# Patient Record
Sex: FEMALE | Race: BLACK OR AFRICAN AMERICAN | NOT HISPANIC OR LATINO | Employment: OTHER | URBAN - METROPOLITAN AREA
[De-identification: names, ages, dates, MRNs, and addresses within clinical notes are randomized per-mention and may not be internally consistent; named-entity substitution may affect disease eponyms.]

---

## 2018-10-31 ENCOUNTER — OFFICE VISIT (OUTPATIENT)
Dept: OBSTETRICS AND GYNECOLOGY | Facility: CLINIC | Age: 43
End: 2018-10-31
Attending: OBSTETRICS & GYNECOLOGY
Payer: COMMERCIAL

## 2018-10-31 ENCOUNTER — LAB VISIT (OUTPATIENT)
Dept: LAB | Facility: OTHER | Age: 43
End: 2018-10-31
Attending: OBSTETRICS & GYNECOLOGY
Payer: COMMERCIAL

## 2018-10-31 VITALS
SYSTOLIC BLOOD PRESSURE: 122 MMHG | BODY MASS INDEX: 19.48 KG/M2 | HEIGHT: 68 IN | WEIGHT: 128.5 LBS | DIASTOLIC BLOOD PRESSURE: 76 MMHG

## 2018-10-31 DIAGNOSIS — Z00.00 HEALTH CARE MAINTENANCE: Primary | ICD-10-CM

## 2018-10-31 DIAGNOSIS — R61 NIGHT SWEATS: ICD-10-CM

## 2018-10-31 PROCEDURE — 99386 PREV VISIT NEW AGE 40-64: CPT | Mod: S$GLB,,, | Performed by: OBSTETRICS & GYNECOLOGY

## 2018-10-31 PROCEDURE — 99999 PR PBB SHADOW E&M-EST. PATIENT-LVL III: CPT | Mod: PBBFAC,,, | Performed by: OBSTETRICS & GYNECOLOGY

## 2018-10-31 PROCEDURE — 83520 IMMUNOASSAY QUANT NOS NONAB: CPT

## 2018-10-31 PROCEDURE — 88175 CYTOPATH C/V AUTO FLUID REDO: CPT

## 2018-10-31 PROCEDURE — 87624 HPV HI-RISK TYP POOLED RSLT: CPT

## 2018-10-31 PROCEDURE — 36415 COLL VENOUS BLD VENIPUNCTURE: CPT

## 2018-10-31 RX ORDER — VALACYCLOVIR HYDROCHLORIDE 1 G/1
1000 TABLET, FILM COATED ORAL DAILY PRN
COMMUNITY
End: 2019-10-24 | Stop reason: SDUPTHER

## 2018-10-31 NOTE — PROGRESS NOTES
Gynecology      SUBJECTIVE:     Chief Complaint: Well Woman       History of Present Illness:  Pt is here for annual exam. PMHx insignificant.  Possibly interested in conceiving in the future. Questions about spontaneous conception versus egg freezing versus egg adoption  Is currently sexually active with one partner. Condoms for contraception. Denies STD history.   Has regular monthly menses. No problems with menses.  Reports night sweats. No hot flashes during the day.  Gets regular exercise. Denies tobacco. Is taking calcium and vitamin D supplements. Denies domestic violence.    Pap 2016? - normal per pt  MMG done a few months ago at outside facility - states she has a right breast cyst that has been monitored and noted to be benign for a few years  Colonoscopy n/a  DEXA n/a      Review of patient's allergies indicates:  No Known Allergies    Past Medical History:   Diagnosis Date    Breast disorder     Fibroids.    Herpes simplex virus (HSV) infection     HSV2.     History reviewed. No pertinent surgical history.  OB History      Para Term  AB Living    0 0 0 0 0 0    SAB TAB Ectopic Multiple Live Births    0 0 0 0 0        Family History   Problem Relation Age of Onset    Breast cancer Neg Hx     Colon cancer Neg Hx     Ovarian cancer Neg Hx      Social History     Tobacco Use    Smoking status: Never Smoker    Smokeless tobacco: Never Used   Substance Use Topics    Alcohol use: Yes     Comment: Socially.    Drug use: No       Current Outpatient Medications   Medication Sig    valACYclovir (VALTREX) 1000 MG tablet Take 1,000 mg by mouth daily as needed.     No current facility-administered medications for this visit.          Review of Systems:  Review of Systems   Constitutional: Negative.    Respiratory: Negative.  Negative for cough and shortness of breath.    Cardiovascular: Negative.  Negative for chest pain.   Gastrointestinal: Negative.  Negative for constipation, diarrhea,  nausea and vomiting.   Genitourinary: Negative.    Musculoskeletal: Negative.    Skin:  Negative.   Breast: negative.         OBJECTIVE:     Physical Exam:  Physical Exam   Constitutional: She is oriented to person, place, and time. She appears well-developed and well-nourished.   HENT:   Head: Normocephalic and atraumatic.   Cardiovascular: Normal rate, regular rhythm and normal heart sounds.   Pulmonary/Chest: Effort normal and breath sounds normal. Right breast exhibits no mass, no nipple discharge, no skin change and no tenderness. Left breast exhibits no mass, no nipple discharge, no skin change and no tenderness.       Abdominal: Soft. Bowel sounds are normal.   Genitourinary: Vagina normal and uterus normal. There is no lesion on the right labia. There is no lesion on the left labia. Uterus is not enlarged and not tender. Cervix exhibits no motion tenderness and no discharge. Right adnexum displays no mass and no tenderness. Left adnexum displays no mass and no tenderness. No tenderness or bleeding in the vagina. No vaginal discharge found.   Musculoskeletal: Normal range of motion.   Neurological: She is alert and oriented to person, place, and time.   Skin: Skin is warm and dry.   Psychiatric: She has a normal mood and affect. Her behavior is normal.         ASSESSMENT:       ICD-10-CM ICD-9-CM    1. Health care maintenance Z00.00 V70.0 Liquid-based pap smear, screening      HPV High Risk Genotypes, PCR   2. Night sweats R61 780.8 Antimullerian hormone (AMH)          Plan:      Surinder was seen today for well woman.    Diagnoses and all orders for this visit:    Health care maintenance  Pt was counseled on healthy diet and exercise, routine screening timelines for PAP/MAMMOGRAM/COLONOSCOPY/LABS. Offered updated immunizations. Counseled regarding appropriate seatbelt use, domestic violence, avoiding tobacco/ETOH/illicit drugs. All questions answered. Pt encouraged to take age-appropriate vitamin daily.    Discussed contraception options. Patient not interested at this time.  -     Liquid-based pap smear, screening  -     HPV High Risk Genotypes, PCR    Night sweats  Discussed fertility options. Will check AMH level. Information given for referral to [facility].  -     Antimullerian hormone (AMH); Future        Orders Placed This Encounter   Procedures    HPV High Risk Genotypes, PCR    Antimullerian hormone (AMH)       Follow-up in about 1 year (around 10/31/2019) for well woman.     Counseling time: 15 minutes    Tea Ramon

## 2018-11-05 LAB — MIS SERPL-MCNC: 0.5 NG/ML (ref 0.9–9.5)

## 2018-11-06 LAB
HPV HR 12 DNA CVX QL NAA+PROBE: NEGATIVE
HPV16 AG SPEC QL: NEGATIVE
HPV18 DNA SPEC QL NAA+PROBE: NEGATIVE

## 2019-10-24 ENCOUNTER — TELEPHONE (OUTPATIENT)
Dept: OBSTETRICS AND GYNECOLOGY | Facility: CLINIC | Age: 44
End: 2019-10-24

## 2019-10-24 DIAGNOSIS — A60.9 HSV (HERPES SIMPLEX VIRUS) ANOGENITAL INFECTION: ICD-10-CM

## 2019-10-24 DIAGNOSIS — B00.9 HERPES: Primary | ICD-10-CM

## 2019-10-24 DIAGNOSIS — A60.9 HSV (HERPES SIMPLEX VIRUS) ANOGENITAL INFECTION: Primary | ICD-10-CM

## 2019-10-24 RX ORDER — VALACYCLOVIR HYDROCHLORIDE 1 G/1
1000 TABLET, FILM COATED ORAL DAILY PRN
Qty: 30 TABLET | Refills: 0 | Status: CANCELLED | OUTPATIENT
Start: 2019-10-24

## 2019-10-24 RX ORDER — VALACYCLOVIR HYDROCHLORIDE 1 G/1
1000 TABLET, FILM COATED ORAL DAILY
Qty: 30 TABLET | Refills: 1 | Status: SHIPPED | OUTPATIENT
Start: 2019-10-24 | End: 2020-09-16

## 2019-10-24 RX ORDER — VALACYCLOVIR HYDROCHLORIDE 1 G/1
1000 TABLET, FILM COATED ORAL DAILY
Qty: 30 TABLET | Refills: 1 | Status: SHIPPED | OUTPATIENT
Start: 2019-10-24 | End: 2019-10-24 | Stop reason: SDUPTHER

## 2019-10-24 NOTE — TELEPHONE ENCOUNTER
----- Message from Moriah Madden RN sent at 10/24/2019 10:02 AM CDT -----  Contact: Pt Refill Request   Please see below. (Previous patient of Dr. Ramon) Thank you!!  Moriah  ----- Message -----  From: Whitney Grewal  Sent: 10/24/2019   8:55 AM CDT  To: Tristen Metcalf Staff    Pt would like refill on valACYclovir (VALTREX) 1000 MG tablet   Please contact Pt once prescription is sent at [phone]      Thanks

## 2019-10-24 NOTE — TELEPHONE ENCOUNTER
Message forwarded to Dr. Juan to see if she will refill rx for valacyclovir 1000 mg .      - Aeisha     ----- Message from Betty Salas LPN sent at 10/24/2019  4:02 PM CDT -----  Contact: Pt Refill Request   Pt is seeing Dr. Juan on 11/7/2019. Previous pt of Dr. Ramon.  ----- Message -----  From: Moriah Madden RN  Sent: 10/24/2019  10:02 AM CDT  To: Humphrey Gunderson Staff, Michael CASTRO Staff    Please see below. (Previous patient of Dr. Ramon) Thank you!!  Moriah  ----- Message -----  From: Whitney Grewal  Sent: 10/24/2019   8:55 AM CDT  To: Tristen Metcalf Staff    Pt would like refill on valACYclovir (VALTREX) 1000 MG tablet   Please contact Pt once prescription is sent at [phone]      Thanks

## 2019-12-02 ENCOUNTER — OFFICE VISIT (OUTPATIENT)
Dept: OBSTETRICS AND GYNECOLOGY | Facility: CLINIC | Age: 44
End: 2019-12-02
Attending: OBSTETRICS & GYNECOLOGY
Payer: COMMERCIAL

## 2019-12-02 ENCOUNTER — LAB VISIT (OUTPATIENT)
Dept: LAB | Facility: OTHER | Age: 44
End: 2019-12-02
Attending: OBSTETRICS & GYNECOLOGY
Payer: COMMERCIAL

## 2019-12-02 VITALS
DIASTOLIC BLOOD PRESSURE: 74 MMHG | HEIGHT: 67 IN | BODY MASS INDEX: 19.89 KG/M2 | SYSTOLIC BLOOD PRESSURE: 122 MMHG | WEIGHT: 126.75 LBS

## 2019-12-02 DIAGNOSIS — Z12.39 SCREENING BREAST EXAMINATION: ICD-10-CM

## 2019-12-02 DIAGNOSIS — Z01.419 WELL WOMAN EXAM WITH ROUTINE GYNECOLOGICAL EXAM: ICD-10-CM

## 2019-12-02 DIAGNOSIS — Z01.419 WELL WOMAN EXAM WITH ROUTINE GYNECOLOGICAL EXAM: Primary | ICD-10-CM

## 2019-12-02 LAB
T4 FREE SERPL-MCNC: 0.78 NG/DL (ref 0.71–1.51)
TSH SERPL DL<=0.005 MIU/L-ACNC: 0.95 UIU/ML (ref 0.4–4)

## 2019-12-02 PROCEDURE — 87491 CHLMYD TRACH DNA AMP PROBE: CPT

## 2019-12-02 PROCEDURE — 99999 PR PBB SHADOW E&M-EST. PATIENT-LVL III: ICD-10-PCS | Mod: PBBFAC,,, | Performed by: OBSTETRICS & GYNECOLOGY

## 2019-12-02 PROCEDURE — 86592 SYPHILIS TEST NON-TREP QUAL: CPT

## 2019-12-02 PROCEDURE — 99999 PR PBB SHADOW E&M-EST. PATIENT-LVL III: CPT | Mod: PBBFAC,,, | Performed by: OBSTETRICS & GYNECOLOGY

## 2019-12-02 PROCEDURE — 99396 PR PREVENTIVE VISIT,EST,40-64: ICD-10-PCS | Mod: S$GLB,,, | Performed by: OBSTETRICS & GYNECOLOGY

## 2019-12-02 PROCEDURE — 86703 HIV-1/HIV-2 1 RESULT ANTBDY: CPT

## 2019-12-02 PROCEDURE — 80074 ACUTE HEPATITIS PANEL: CPT

## 2019-12-02 PROCEDURE — 84439 ASSAY OF FREE THYROXINE: CPT

## 2019-12-02 PROCEDURE — 84443 ASSAY THYROID STIM HORMONE: CPT

## 2019-12-02 PROCEDURE — 36415 COLL VENOUS BLD VENIPUNCTURE: CPT

## 2019-12-02 PROCEDURE — 99396 PREV VISIT EST AGE 40-64: CPT | Mod: S$GLB,,, | Performed by: OBSTETRICS & GYNECOLOGY

## 2019-12-02 NOTE — PROGRESS NOTES
CC: Well woman exam    Surinder Elizabeth is a 44 y.o. female  presents for well woman exam.  LMP: Patient's last menstrual period was 2019..  No issues, problems, or complaints.  Pap negative in 2018.  No mmg noted in chart, history of breast cyst.  Thyroid U/S last year, was reported as normal.    Past Medical History:   Diagnosis Date    Breast disorder     Fibroids.    Herpes simplex virus (HSV) infection     HSV2.     History reviewed. No pertinent surgical history.  Social History     Socioeconomic History    Marital status: Single     Spouse name: Not on file    Number of children: Not on file    Years of education: Not on file    Highest education level: Not on file   Occupational History    Not on file   Social Needs    Financial resource strain: Not on file    Food insecurity:     Worry: Not on file     Inability: Not on file    Transportation needs:     Medical: Not on file     Non-medical: Not on file   Tobacco Use    Smoking status: Never Smoker    Smokeless tobacco: Never Used   Substance and Sexual Activity    Alcohol use: Yes     Comment: Socially.    Drug use: No    Sexual activity: Yes     Partners: Male     Birth control/protection: Condom   Lifestyle    Physical activity:     Days per week: Not on file     Minutes per session: Not on file    Stress: Not on file   Relationships    Social connections:     Talks on phone: Not on file     Gets together: Not on file     Attends Nondenominational service: Not on file     Active member of club or organization: Not on file     Attends meetings of clubs or organizations: Not on file     Relationship status: Not on file   Other Topics Concern    Not on file   Social History Narrative    Not on file     Family History   Problem Relation Age of Onset    Breast cancer Neg Hx     Colon cancer Neg Hx     Ovarian cancer Neg Hx      OB History        0    Para   0    Term   0       0    AB   0    Living   0       SAB   0    TAB  "  0    Ectopic   0    Multiple   0    Live Births   0                 /74   Ht 5' 7" (1.702 m)   Wt 57.5 kg (126 lb 12.2 oz)   LMP 11/16/2019   BMI 19.85 kg/m²       ROS:  GENERAL: Denies weight gain or weight loss. Feeling well overall.   SKIN: Denies rash or lesions.   HEAD: Denies head injury or headache.   NODES: Denies enlarged lymph nodes.   CHEST: Denies chest pain or shortness of breath.   CARDIOVASCULAR: Denies palpitations or left sided chest pain.   ABDOMEN: No abdominal pain, constipation, diarrhea, nausea, vomiting or rectal bleeding.   URINARY: No frequency, dysuria, hematuria, or burning on urination.  REPRODUCTIVE: See HPI.   BREASTS: The patient performs breast self-examination and denies pain, lumps, or nipple discharge.   HEMATOLOGIC: No easy bruisability or excessive bleeding.   MUSCULOSKELETAL: Denies joint pain or swelling.   NEUROLOGIC: Denies syncope or weakness.   PSYCHIATRIC: Denies depression, anxiety or mood swings.    PHYSICAL EXAM:  APPEARANCE: Well nourished, well developed, in no acute distress.  AFFECT: WNL, alert and oriented x 3  SKIN: No acne or hirsutism  NECK: Neck symmetric without masses or thyromegaly  NODES: No inguinal, cervical, axillary, or femoral lymph node enlargement  CHEST: Good respiratory effect  ABDOMEN: Soft.  No tenderness or masses.  No hepatosplenomegaly.  No hernias.  BREASTS: Symmetrical, no skin changes or visible lesions.  No palpable masses, nipple discharge bilaterally.  PELVIC: Normal external genitalia without lesions.  Normal hair distribution.  Adequate perineal body, normal urethral meatus.  Vagina moist and well rugated without lesions or discharge.  Cervix pink, without lesions, discharge or tenderness.  No significant cystocele or rectocele.  Bimanual exam shows uterus to be normal size, regular, mobile and nontender.  Adnexa without masses or tenderness.    EXTREMITIES: No edema.    Well woman exam with routine gynecological exam  -    "  HIV 1/2 Ag/Ab (4th Gen); Future; Expected date: 12/02/2019  -     RPR; Future; Expected date: 12/02/2019  -     Hepatitis panel, acute; Future; Expected date: 12/02/2019  -     C. trachomatis/N. gonorrhoeae by AMP DNA  -     TSH; Future; Expected date: 12/02/2019  -     T4, free; Future; Expected date: 12/02/2019    Screening breast examination  -     Mammo Digital Screening Bilat With CAD; Future; Expected date: 12/02/2019            Patient was counseled today on A.C.S. Pap guidelines and recommendations for yearly pelvic exams, mammograms and monthly self breast exams; to see her PCP for other health maintenance.     No follow-ups on file.

## 2019-12-03 LAB
C TRACH DNA SPEC QL NAA+PROBE: NOT DETECTED
HAV IGM SERPL QL IA: NEGATIVE
HBV CORE IGM SERPL QL IA: NEGATIVE
HBV SURFACE AG SERPL QL IA: NEGATIVE
HCV AB SERPL QL IA: NEGATIVE
HIV 1+2 AB+HIV1 P24 AG SERPL QL IA: NEGATIVE
N GONORRHOEA DNA SPEC QL NAA+PROBE: NOT DETECTED

## 2020-01-06 LAB — RPR SER QL: NORMAL

## 2021-04-26 ENCOUNTER — PATIENT MESSAGE (OUTPATIENT)
Dept: RESEARCH | Facility: HOSPITAL | Age: 46
End: 2021-04-26

## 2021-07-22 ENCOUNTER — IMMUNIZATION (OUTPATIENT)
Dept: PRIMARY CARE CLINIC | Facility: CLINIC | Age: 46
End: 2021-07-22
Payer: COMMERCIAL

## 2021-07-22 ENCOUNTER — LAB VISIT (OUTPATIENT)
Dept: LAB | Facility: OTHER | Age: 46
End: 2021-07-22
Attending: OBSTETRICS & GYNECOLOGY
Payer: COMMERCIAL

## 2021-07-22 ENCOUNTER — OFFICE VISIT (OUTPATIENT)
Dept: OBSTETRICS AND GYNECOLOGY | Facility: CLINIC | Age: 46
End: 2021-07-22
Attending: OBSTETRICS & GYNECOLOGY
Payer: COMMERCIAL

## 2021-07-22 VITALS
BODY MASS INDEX: 19.37 KG/M2 | WEIGHT: 123.69 LBS | DIASTOLIC BLOOD PRESSURE: 70 MMHG | SYSTOLIC BLOOD PRESSURE: 120 MMHG

## 2021-07-22 DIAGNOSIS — Z01.419 WELL WOMAN EXAM WITH ROUTINE GYNECOLOGICAL EXAM: Primary | ICD-10-CM

## 2021-07-22 DIAGNOSIS — Z12.39 SCREENING BREAST EXAMINATION: ICD-10-CM

## 2021-07-22 DIAGNOSIS — Z01.419 WELL WOMAN EXAM WITH ROUTINE GYNECOLOGICAL EXAM: ICD-10-CM

## 2021-07-22 DIAGNOSIS — Z23 NEED FOR VACCINATION: Primary | ICD-10-CM

## 2021-07-22 LAB
ALBUMIN SERPL BCP-MCNC: 4.2 G/DL (ref 3.5–5.2)
ALP SERPL-CCNC: 69 U/L (ref 55–135)
ALT SERPL W/O P-5'-P-CCNC: 14 U/L (ref 10–44)
ANION GAP SERPL CALC-SCNC: 7 MMOL/L (ref 8–16)
AST SERPL-CCNC: 13 U/L (ref 10–40)
BASOPHILS # BLD AUTO: 0.02 K/UL (ref 0–0.2)
BASOPHILS NFR BLD: 0.6 % (ref 0–1.9)
BILIRUB SERPL-MCNC: 0.5 MG/DL (ref 0.1–1)
BUN SERPL-MCNC: 4 MG/DL (ref 6–20)
CALCIUM SERPL-MCNC: 9.1 MG/DL (ref 8.7–10.5)
CHLORIDE SERPL-SCNC: 109 MMOL/L (ref 95–110)
CO2 SERPL-SCNC: 23 MMOL/L (ref 23–29)
CREAT SERPL-MCNC: 0.8 MG/DL (ref 0.5–1.4)
DIFFERENTIAL METHOD: ABNORMAL
EOSINOPHIL # BLD AUTO: 0 K/UL (ref 0–0.5)
EOSINOPHIL NFR BLD: 0.3 % (ref 0–8)
ERYTHROCYTE [DISTWIDTH] IN BLOOD BY AUTOMATED COUNT: 18.9 % (ref 11.5–14.5)
EST. GFR  (AFRICAN AMERICAN): >60 ML/MIN/1.73 M^2
EST. GFR  (NON AFRICAN AMERICAN): >60 ML/MIN/1.73 M^2
ESTIMATED AVG GLUCOSE: 94 MG/DL (ref 68–131)
GLUCOSE SERPL-MCNC: 92 MG/DL (ref 70–110)
HBA1C MFR BLD: 4.9 % (ref 4–5.6)
HCT VFR BLD AUTO: 30.8 % (ref 37–48.5)
HGB BLD-MCNC: 8 G/DL (ref 12–16)
IMM GRANULOCYTES # BLD AUTO: 0.01 K/UL (ref 0–0.04)
IMM GRANULOCYTES NFR BLD AUTO: 0.3 % (ref 0–0.5)
LYMPHOCYTES # BLD AUTO: 0.9 K/UL (ref 1–4.8)
LYMPHOCYTES NFR BLD: 26.1 % (ref 18–48)
MCH RBC QN AUTO: 17.5 PG (ref 27–31)
MCHC RBC AUTO-ENTMCNC: 26 G/DL (ref 32–36)
MCV RBC AUTO: 68 FL (ref 82–98)
MONOCYTES # BLD AUTO: 0.5 K/UL (ref 0.3–1)
MONOCYTES NFR BLD: 13.7 % (ref 4–15)
NEUTROPHILS # BLD AUTO: 2.1 K/UL (ref 1.8–7.7)
NEUTROPHILS NFR BLD: 59 % (ref 38–73)
NRBC BLD-RTO: 0 /100 WBC
PLATELET # BLD AUTO: 374 K/UL (ref 150–450)
PMV BLD AUTO: 8.7 FL (ref 9.2–12.9)
POTASSIUM SERPL-SCNC: 4.4 MMOL/L (ref 3.5–5.1)
PROT SERPL-MCNC: 7.9 G/DL (ref 6–8.4)
RBC # BLD AUTO: 4.56 M/UL (ref 4–5.4)
SODIUM SERPL-SCNC: 139 MMOL/L (ref 136–145)
T4 FREE SERPL-MCNC: 0.9 NG/DL (ref 0.71–1.51)
TSH SERPL DL<=0.005 MIU/L-ACNC: 0.42 UIU/ML (ref 0.4–4)
WBC # BLD AUTO: 3.57 K/UL (ref 3.9–12.7)

## 2021-07-22 PROCEDURE — 88175 CYTOPATH C/V AUTO FLUID REDO: CPT | Performed by: OBSTETRICS & GYNECOLOGY

## 2021-07-22 PROCEDURE — 0001A COVID-19, MRNA, LNP-S, PF, 30 MCG/0.3 ML DOSE VACCINE: ICD-10-PCS | Mod: CV19,S$GLB,, | Performed by: INTERNAL MEDICINE

## 2021-07-22 PROCEDURE — 3044F PR MOST RECENT HEMOGLOBIN A1C LEVEL <7.0%: ICD-10-PCS | Mod: CPTII,S$GLB,, | Performed by: OBSTETRICS & GYNECOLOGY

## 2021-07-22 PROCEDURE — 80053 COMPREHEN METABOLIC PANEL: CPT | Performed by: OBSTETRICS & GYNECOLOGY

## 2021-07-22 PROCEDURE — 3078F PR MOST RECENT DIASTOLIC BLOOD PRESSURE < 80 MM HG: ICD-10-PCS | Mod: CPTII,S$GLB,, | Performed by: OBSTETRICS & GYNECOLOGY

## 2021-07-22 PROCEDURE — 99999 PR PBB SHADOW E&M-EST. PATIENT-LVL III: CPT | Mod: PBBFAC,,, | Performed by: OBSTETRICS & GYNECOLOGY

## 2021-07-22 PROCEDURE — 99396 PREV VISIT EST AGE 40-64: CPT | Mod: S$GLB,,, | Performed by: OBSTETRICS & GYNECOLOGY

## 2021-07-22 PROCEDURE — 3078F DIAST BP <80 MM HG: CPT | Mod: CPTII,S$GLB,, | Performed by: OBSTETRICS & GYNECOLOGY

## 2021-07-22 PROCEDURE — 84443 ASSAY THYROID STIM HORMONE: CPT | Performed by: OBSTETRICS & GYNECOLOGY

## 2021-07-22 PROCEDURE — 3008F PR BODY MASS INDEX (BMI) DOCUMENTED: ICD-10-PCS | Mod: CPTII,S$GLB,, | Performed by: OBSTETRICS & GYNECOLOGY

## 2021-07-22 PROCEDURE — 99999 PR PBB SHADOW E&M-EST. PATIENT-LVL III: ICD-10-PCS | Mod: PBBFAC,,, | Performed by: OBSTETRICS & GYNECOLOGY

## 2021-07-22 PROCEDURE — 87624 HPV HI-RISK TYP POOLED RSLT: CPT | Performed by: OBSTETRICS & GYNECOLOGY

## 2021-07-22 PROCEDURE — 3044F HG A1C LEVEL LT 7.0%: CPT | Mod: CPTII,S$GLB,, | Performed by: OBSTETRICS & GYNECOLOGY

## 2021-07-22 PROCEDURE — 3074F SYST BP LT 130 MM HG: CPT | Mod: CPTII,S$GLB,, | Performed by: OBSTETRICS & GYNECOLOGY

## 2021-07-22 PROCEDURE — 0001A COVID-19, MRNA, LNP-S, PF, 30 MCG/0.3 ML DOSE VACCINE: CPT | Mod: CV19,S$GLB,, | Performed by: INTERNAL MEDICINE

## 2021-07-22 PROCEDURE — 91300 COVID-19, MRNA, LNP-S, PF, 30 MCG/0.3 ML DOSE VACCINE: CPT | Mod: S$GLB,,, | Performed by: INTERNAL MEDICINE

## 2021-07-22 PROCEDURE — 1126F PR PAIN SEVERITY QUANTIFIED, NO PAIN PRESENT: ICD-10-PCS | Mod: CPTII,S$GLB,, | Performed by: OBSTETRICS & GYNECOLOGY

## 2021-07-22 PROCEDURE — 3074F PR MOST RECENT SYSTOLIC BLOOD PRESSURE < 130 MM HG: ICD-10-PCS | Mod: CPTII,S$GLB,, | Performed by: OBSTETRICS & GYNECOLOGY

## 2021-07-22 PROCEDURE — 85025 COMPLETE CBC W/AUTO DIFF WBC: CPT | Performed by: OBSTETRICS & GYNECOLOGY

## 2021-07-22 PROCEDURE — 91300 COVID-19, MRNA, LNP-S, PF, 30 MCG/0.3 ML DOSE VACCINE: ICD-10-PCS | Mod: S$GLB,,, | Performed by: INTERNAL MEDICINE

## 2021-07-22 PROCEDURE — 84439 ASSAY OF FREE THYROXINE: CPT | Performed by: OBSTETRICS & GYNECOLOGY

## 2021-07-22 PROCEDURE — 99396 PR PREVENTIVE VISIT,EST,40-64: ICD-10-PCS | Mod: S$GLB,,, | Performed by: OBSTETRICS & GYNECOLOGY

## 2021-07-22 PROCEDURE — 83036 HEMOGLOBIN GLYCOSYLATED A1C: CPT | Performed by: OBSTETRICS & GYNECOLOGY

## 2021-07-22 PROCEDURE — 3008F BODY MASS INDEX DOCD: CPT | Mod: CPTII,S$GLB,, | Performed by: OBSTETRICS & GYNECOLOGY

## 2021-07-22 PROCEDURE — 36415 COLL VENOUS BLD VENIPUNCTURE: CPT | Performed by: OBSTETRICS & GYNECOLOGY

## 2021-07-22 PROCEDURE — 1126F AMNT PAIN NOTED NONE PRSNT: CPT | Mod: CPTII,S$GLB,, | Performed by: OBSTETRICS & GYNECOLOGY

## 2021-07-29 LAB
FINAL PATHOLOGIC DIAGNOSIS: NORMAL
Lab: NORMAL

## 2021-08-17 ENCOUNTER — IMMUNIZATION (OUTPATIENT)
Dept: PRIMARY CARE CLINIC | Facility: CLINIC | Age: 46
End: 2021-08-17
Payer: COMMERCIAL

## 2021-08-17 DIAGNOSIS — Z23 NEED FOR VACCINATION: Primary | ICD-10-CM

## 2021-08-17 PROCEDURE — 0002A COVID-19, MRNA, LNP-S, PF, 30 MCG/0.3 ML DOSE VACCINE: CPT | Mod: CV19,S$GLB,, | Performed by: INTERNAL MEDICINE

## 2021-08-17 PROCEDURE — 91300 COVID-19, MRNA, LNP-S, PF, 30 MCG/0.3 ML DOSE VACCINE: ICD-10-PCS | Mod: S$GLB,,, | Performed by: INTERNAL MEDICINE

## 2021-08-17 PROCEDURE — 0002A COVID-19, MRNA, LNP-S, PF, 30 MCG/0.3 ML DOSE VACCINE: ICD-10-PCS | Mod: CV19,S$GLB,, | Performed by: INTERNAL MEDICINE

## 2021-08-17 PROCEDURE — 91300 COVID-19, MRNA, LNP-S, PF, 30 MCG/0.3 ML DOSE VACCINE: CPT | Mod: S$GLB,,, | Performed by: INTERNAL MEDICINE

## 2021-08-18 LAB
HPV HR 12 DNA SPEC QL NAA+PROBE: NEGATIVE
HPV16 AG SPEC QL: NEGATIVE
HPV18 DNA SPEC QL NAA+PROBE: NEGATIVE

## 2021-08-20 ENCOUNTER — HOSPITAL ENCOUNTER (OUTPATIENT)
Dept: RADIOLOGY | Facility: OTHER | Age: 46
Discharge: HOME | End: 2021-08-20
Attending: OBSTETRICS & GYNECOLOGY
Payer: COMMERCIAL

## 2021-08-20 DIAGNOSIS — Z01.419 WELL WOMAN EXAM WITH ROUTINE GYNECOLOGICAL EXAM: ICD-10-CM

## 2021-08-20 PROCEDURE — 76536 US EXAM OF HEAD AND NECK: CPT | Mod: TC

## 2021-08-20 PROCEDURE — 76536 US THYROID: ICD-10-PCS | Mod: 26,,, | Performed by: RADIOLOGY

## 2021-08-20 PROCEDURE — 76536 US EXAM OF HEAD AND NECK: CPT | Mod: 26,,, | Performed by: RADIOLOGY

## 2021-08-21 ENCOUNTER — PATIENT MESSAGE (OUTPATIENT)
Dept: OBSTETRICS AND GYNECOLOGY | Facility: CLINIC | Age: 46
End: 2021-08-21

## 2022-05-16 ENCOUNTER — TELEPHONE (OUTPATIENT)
Dept: OBSTETRICS AND GYNECOLOGY | Facility: CLINIC | Age: 47
End: 2022-05-16
Payer: COMMERCIAL

## 2022-05-16 NOTE — TELEPHONE ENCOUNTER
----- Message from Ruma Jacobs sent at 5/16/2022 12:12 PM CDT -----  Contact: ALEXANDRA PASTRANA [[id number]]  Type: Call Back      Who called: ALEXANDRA PASTRANA [[id number]]      What is the request in detail: Patient is requesting a call back. She states during her last annual it was discussed getting her scheduled for a Colonoscopy. She states that she noticed blood in her stool and she is very concerned. Please advise.       Can the clinic reply by [facility]? No      Would the patient rather a call back or a response via [facility]? Call back       Best call back number: [phone] (mobile)      Additional Information:

## 2022-10-26 ENCOUNTER — TELEPHONE (OUTPATIENT)
Dept: OBSTETRICS AND GYNECOLOGY | Facility: CLINIC | Age: 47
End: 2022-10-26
Payer: COMMERCIAL

## 2022-10-26 DIAGNOSIS — Z12.39 SCREENING BREAST EXAMINATION: Primary | ICD-10-CM

## 2023-03-13 ENCOUNTER — OFFICE VISIT (OUTPATIENT)
Dept: OBSTETRICS AND GYNECOLOGY | Facility: CLINIC | Age: 48
End: 2023-03-13
Attending: OBSTETRICS & GYNECOLOGY
Payer: COMMERCIAL

## 2023-03-13 ENCOUNTER — LAB VISIT (OUTPATIENT)
Dept: LAB | Facility: OTHER | Age: 48
End: 2023-03-13
Attending: OBSTETRICS & GYNECOLOGY
Payer: COMMERCIAL

## 2023-03-13 VITALS
SYSTOLIC BLOOD PRESSURE: 114 MMHG | BODY MASS INDEX: 19.69 KG/M2 | HEIGHT: 67 IN | DIASTOLIC BLOOD PRESSURE: 75 MMHG | WEIGHT: 125.44 LBS

## 2023-03-13 DIAGNOSIS — Z12.39 SCREENING BREAST EXAMINATION: ICD-10-CM

## 2023-03-13 DIAGNOSIS — E04.1 THYROID NODULE: ICD-10-CM

## 2023-03-13 DIAGNOSIS — Z01.419 WELL WOMAN EXAM WITH ROUTINE GYNECOLOGICAL EXAM: Primary | ICD-10-CM

## 2023-03-13 DIAGNOSIS — N93.9 ABNORMAL UTERINE BLEEDING (AUB): ICD-10-CM

## 2023-03-13 DIAGNOSIS — D64.9 ANEMIA, UNSPECIFIED TYPE: ICD-10-CM

## 2023-03-13 DIAGNOSIS — Z12.11 COLON CANCER SCREENING: ICD-10-CM

## 2023-03-13 LAB
BASOPHILS # BLD AUTO: 0.03 K/UL (ref 0–0.2)
BASOPHILS NFR BLD: 0.9 % (ref 0–1.9)
DIFFERENTIAL METHOD: ABNORMAL
EOSINOPHIL # BLD AUTO: 0 K/UL (ref 0–0.5)
EOSINOPHIL NFR BLD: 0.6 % (ref 0–8)
ERYTHROCYTE [DISTWIDTH] IN BLOOD BY AUTOMATED COUNT: 19.6 % (ref 11.5–14.5)
ESTIMATED AVG GLUCOSE: 94 MG/DL (ref 68–131)
FERRITIN SERPL-MCNC: <2 NG/ML (ref 20–300)
HBA1C MFR BLD: 4.9 % (ref 4–5.6)
HCT VFR BLD AUTO: 26.9 % (ref 37–48.5)
HGB BLD-MCNC: 6.9 G/DL (ref 12–16)
IMM GRANULOCYTES # BLD AUTO: 0.01 K/UL (ref 0–0.04)
IMM GRANULOCYTES NFR BLD AUTO: 0.3 % (ref 0–0.5)
IRON SERPL-MCNC: 10 UG/DL (ref 30–160)
LYMPHOCYTES # BLD AUTO: 1 K/UL (ref 1–4.8)
LYMPHOCYTES NFR BLD: 32.1 % (ref 18–48)
MCH RBC QN AUTO: 16.4 PG (ref 27–31)
MCHC RBC AUTO-ENTMCNC: 25.7 G/DL (ref 32–36)
MCV RBC AUTO: 64 FL (ref 82–98)
MONOCYTES # BLD AUTO: 0.6 K/UL (ref 0.3–1)
MONOCYTES NFR BLD: 19.4 % (ref 4–15)
NEUTROPHILS # BLD AUTO: 1.5 K/UL (ref 1.8–7.7)
NEUTROPHILS NFR BLD: 46.7 % (ref 38–73)
NRBC BLD-RTO: 0 /100 WBC
PLATELET # BLD AUTO: 465 K/UL (ref 150–450)
PMV BLD AUTO: 8.7 FL (ref 9.2–12.9)
RBC # BLD AUTO: 4.22 M/UL (ref 4–5.4)
SATURATED IRON: 2 % (ref 20–50)
T4 FREE SERPL-MCNC: 0.85 NG/DL (ref 0.71–1.51)
TOTAL IRON BINDING CAPACITY: 602 UG/DL (ref 250–450)
TRANSFERRIN SERPL-MCNC: 407 MG/DL (ref 200–375)
TSH SERPL DL<=0.005 MIU/L-ACNC: 0.6 UIU/ML (ref 0.4–4)
WBC # BLD AUTO: 3.24 K/UL (ref 3.9–12.7)

## 2023-03-13 PROCEDURE — 84443 ASSAY THYROID STIM HORMONE: CPT | Performed by: OBSTETRICS & GYNECOLOGY

## 2023-03-13 PROCEDURE — 3078F PR MOST RECENT DIASTOLIC BLOOD PRESSURE < 80 MM HG: ICD-10-PCS | Mod: CPTII,S$GLB,, | Performed by: OBSTETRICS & GYNECOLOGY

## 2023-03-13 PROCEDURE — 84466 ASSAY OF TRANSFERRIN: CPT | Performed by: OBSTETRICS & GYNECOLOGY

## 2023-03-13 PROCEDURE — 3078F DIAST BP <80 MM HG: CPT | Mod: CPTII,S$GLB,, | Performed by: OBSTETRICS & GYNECOLOGY

## 2023-03-13 PROCEDURE — 99396 PREV VISIT EST AGE 40-64: CPT | Mod: S$GLB,,, | Performed by: OBSTETRICS & GYNECOLOGY

## 2023-03-13 PROCEDURE — 99999 PR PBB SHADOW E&M-EST. PATIENT-LVL III: ICD-10-PCS | Mod: PBBFAC,,, | Performed by: OBSTETRICS & GYNECOLOGY

## 2023-03-13 PROCEDURE — 85025 COMPLETE CBC W/AUTO DIFF WBC: CPT | Performed by: OBSTETRICS & GYNECOLOGY

## 2023-03-13 PROCEDURE — 83020 HEMOGLOBIN ELECTROPHORESIS: CPT | Performed by: OBSTETRICS & GYNECOLOGY

## 2023-03-13 PROCEDURE — 3008F BODY MASS INDEX DOCD: CPT | Mod: CPTII,S$GLB,, | Performed by: OBSTETRICS & GYNECOLOGY

## 2023-03-13 PROCEDURE — 83036 HEMOGLOBIN GLYCOSYLATED A1C: CPT | Performed by: OBSTETRICS & GYNECOLOGY

## 2023-03-13 PROCEDURE — 99999 PR PBB SHADOW E&M-EST. PATIENT-LVL III: CPT | Mod: PBBFAC,,, | Performed by: OBSTETRICS & GYNECOLOGY

## 2023-03-13 PROCEDURE — 99396 PR PREVENTIVE VISIT,EST,40-64: ICD-10-PCS | Mod: S$GLB,,, | Performed by: OBSTETRICS & GYNECOLOGY

## 2023-03-13 PROCEDURE — 36415 COLL VENOUS BLD VENIPUNCTURE: CPT | Performed by: OBSTETRICS & GYNECOLOGY

## 2023-03-13 PROCEDURE — 1159F MED LIST DOCD IN RCRD: CPT | Mod: CPTII,S$GLB,, | Performed by: OBSTETRICS & GYNECOLOGY

## 2023-03-13 PROCEDURE — 1159F PR MEDICATION LIST DOCUMENTED IN MEDICAL RECORD: ICD-10-PCS | Mod: CPTII,S$GLB,, | Performed by: OBSTETRICS & GYNECOLOGY

## 2023-03-13 PROCEDURE — 3008F PR BODY MASS INDEX (BMI) DOCUMENTED: ICD-10-PCS | Mod: CPTII,S$GLB,, | Performed by: OBSTETRICS & GYNECOLOGY

## 2023-03-13 PROCEDURE — 84439 ASSAY OF FREE THYROXINE: CPT | Performed by: OBSTETRICS & GYNECOLOGY

## 2023-03-13 PROCEDURE — 82728 ASSAY OF FERRITIN: CPT | Performed by: OBSTETRICS & GYNECOLOGY

## 2023-03-13 PROCEDURE — 3074F PR MOST RECENT SYSTOLIC BLOOD PRESSURE < 130 MM HG: ICD-10-PCS | Mod: CPTII,S$GLB,, | Performed by: OBSTETRICS & GYNECOLOGY

## 2023-03-13 PROCEDURE — 3074F SYST BP LT 130 MM HG: CPT | Mod: CPTII,S$GLB,, | Performed by: OBSTETRICS & GYNECOLOGY

## 2023-03-13 RX ORDER — POLYETHYLENE GLYCOL 3350, SODIUM SULFATE ANHYDROUS, SODIUM BICARBONATE, SODIUM CHLORIDE, POTASSIUM CHLORIDE 236; 22.74; 6.74; 5.86; 2.97 G/4L; G/4L; G/4L; G/4L; G/4L
POWDER, FOR SOLUTION ORAL
COMMUNITY
Start: 2022-10-19 | End: 2024-01-22

## 2023-03-13 NOTE — PROGRESS NOTES
"  CC: Well woman exam    Surinder Elizabeth is a 47 y.o. female  presents for well woman exam.  LMP: Patient's last menstrual period was 2023 (exact date)..  No issues, problems, or complaints.  January spottied most of the month.  No irregular bleeding in February.  , then 3/7.    Mammogram normal this year.     Past Medical History:   Diagnosis Date    Breast disorder     Fibroids.    Herpes simplex virus (HSV) infection     HSV2.     History reviewed. No pertinent surgical history.  Social History     Socioeconomic History    Marital status: Single   Tobacco Use    Smoking status: Never    Smokeless tobacco: Never   Substance and Sexual Activity    Alcohol use: Yes     Comment: Socially.    Drug use: No    Sexual activity: Yes     Partners: Male     Family History   Problem Relation Age of Onset    Hypertension Father     Prostate cancer Father 68    Breast cancer Neg Hx     Colon cancer Neg Hx     Ovarian cancer Neg Hx      OB History          0    Para   0    Term   0       0    AB   0    Living   0         SAB   0    IAB   0    Ectopic   0    Multiple   0    Live Births   0                 /75   Ht 5' 7" (1.702 m)   Wt 56.9 kg (125 lb 7.1 oz)   LMP 2023 (Exact Date)   BMI 19.65 kg/m²       ROS:  GENERAL: Denies weight gain or weight loss. Feeling well overall.   SKIN: Denies rash or lesions.   HEAD: Denies head injury or headache.   NODES: Denies enlarged lymph nodes.   CHEST: Denies chest pain or shortness of breath.   CARDIOVASCULAR: Denies palpitations or left sided chest pain.   ABDOMEN: No abdominal pain, constipation, diarrhea, nausea, vomiting or rectal bleeding.   URINARY: No frequency, dysuria, hematuria, or burning on urination.  REPRODUCTIVE: See HPI.   BREASTS: The patient performs breast self-examination and denies pain, lumps, or nipple discharge.   HEMATOLOGIC: No easy bruisability or excessive bleeding.   MUSCULOSKELETAL: Denies joint pain or swelling. "   NEUROLOGIC: Denies syncope or weakness.   PSYCHIATRIC: Denies depression, anxiety or mood swings.    PHYSICAL EXAM:  APPEARANCE: Well nourished, well developed, in no acute distress.  AFFECT: WNL, alert and oriented x 3  SKIN: No acne or hirsutism  NECK: Neck symmetric without masses or thyromegaly  NODES: No inguinal, cervical, axillary, or femoral lymph node enlargement  CHEST: Good respiratory effect  ABDOMEN: Soft.  No tenderness or masses.  No hepatosplenomegaly.  No hernias.  BREASTS: Symmetrical, no skin changes or visible lesions.  No palpable masses, nipple discharge bilaterally.  PELVIC: Normal external genitalia without lesions.  Normal hair distribution.  Adequate perineal body, normal urethral meatus.  Vagina moist and well rugated without lesions or discharge.  Cervix pink, without lesions, discharge or tenderness.  No significant cystocele or rectocele.  Bimanual exam shows uterus to be normal size, regular, mobile and nontender.  Adnexa without masses or tenderness.    EXTREMITIES: No edema.    Well woman exam with routine gynecological exam    Thyroid nodule  -     US Thyroid; Future; Expected date: 03/13/2023  -     TSH; Future; Expected date: 03/13/2023  -     T4, FREE; Future; Expected date: 03/13/2023    Screening breast examination  -     Cancel: Mammo Digital Screening Bilat; Future; Expected date: 03/13/2023    Colon cancer screening  -     Ambulatory referral/consult to Hematology / Oncology; Future; Expected date: 03/20/2023  -     Case Request Endoscopy: COLONOSCOPY    Anemia, unspecified type  -     CBC Auto Differential; Future; Expected date: 03/13/2023  -     FERRITIN; Future; Expected date: 03/13/2023  -     IRON AND TIBC; Future; Expected date: 03/13/2023  -     Hemoglobin A1C; Future; Expected date: 03/13/2023  -     Hemoglobin Electrophoresis,Hgb A2 Dipesh.; Future; Expected date: 03/13/2023  -     TSH; Future; Expected date: 03/13/2023  -     T4, FREE; Future; Expected date:  03/13/2023    Abnormal uterine bleeding (AUB)  -     US Pelvis Comp with Transvag NON-OB (xpd; Future; Expected date: 03/13/2023            Patient was counseled today on A.C.S. Pap guidelines and recommendations for yearly pelvic exams, mammograms and monthly self breast exams; to see her PCP for other health maintenance.     No follow-ups on file.

## 2023-03-14 ENCOUNTER — TELEPHONE (OUTPATIENT)
Dept: OBSTETRICS AND GYNECOLOGY | Facility: CLINIC | Age: 48
End: 2023-03-14
Payer: COMMERCIAL

## 2023-03-14 ENCOUNTER — TELEPHONE (OUTPATIENT)
Dept: HEMATOLOGY/ONCOLOGY | Facility: CLINIC | Age: 48
End: 2023-03-14
Payer: COMMERCIAL

## 2023-03-14 DIAGNOSIS — D64.9 ANEMIA, UNSPECIFIED TYPE: Primary | ICD-10-CM

## 2023-03-14 NOTE — TELEPHONE ENCOUNTER
----- Message from Nikhil Webster sent at 3/14/2023  1:09 PM CDT -----  Regarding: NP Appt Request  Name of Who is Calling: ALEXANDRA PASTRANA [[id number]]           What is the request in detail: Patient is requesting a call back to schedule a NP appointment.  Please assist.           Can the clinic reply by [facility]: No           [facility]: [phone]

## 2023-03-14 NOTE — TELEPHONE ENCOUNTER
Spoke to patient. Informed her we are putting in another referral due to her abnormal labs and someone from hematology will contact her for scheduling.

## 2023-03-14 NOTE — TELEPHONE ENCOUNTER
Called Patient to let her know what dates and times we had available to get her scheduled a new patient appointment.    Roxana STONE

## 2023-03-15 LAB
HGB A2 MFR BLD HPLC: 1.7 % (ref 2.2–3.2)
HGB FRACT BLD ELPH-IMP: ABNORMAL
HGB FRACT BLD ELPH-IMP: NORMAL

## 2023-03-16 ENCOUNTER — HOSPITAL ENCOUNTER (OUTPATIENT)
Dept: RADIOLOGY | Facility: OTHER | Age: 48
Discharge: HOME | End: 2023-03-16
Attending: OBSTETRICS & GYNECOLOGY
Payer: COMMERCIAL

## 2023-03-16 ENCOUNTER — PATIENT MESSAGE (OUTPATIENT)
Dept: OBSTETRICS AND GYNECOLOGY | Facility: CLINIC | Age: 48
End: 2023-03-16
Payer: COMMERCIAL

## 2023-03-16 ENCOUNTER — TELEPHONE (OUTPATIENT)
Dept: OBSTETRICS AND GYNECOLOGY | Facility: CLINIC | Age: 48
End: 2023-03-16
Payer: COMMERCIAL

## 2023-03-16 DIAGNOSIS — N93.9 ABNORMAL UTERINE BLEEDING (AUB): ICD-10-CM

## 2023-03-16 PROCEDURE — 76856 US EXAM PELVIC COMPLETE: CPT | Mod: 26,,, | Performed by: RADIOLOGY

## 2023-03-16 PROCEDURE — 76830 US PELVIS COMP WITH TRANSVAG NON-OB (XPD): ICD-10-PCS | Mod: 26,,, | Performed by: RADIOLOGY

## 2023-03-16 PROCEDURE — 76830 TRANSVAGINAL US NON-OB: CPT | Mod: 26,,, | Performed by: RADIOLOGY

## 2023-03-16 PROCEDURE — 76856 US EXAM PELVIC COMPLETE: CPT | Mod: TC

## 2023-03-16 PROCEDURE — 76856 US PELVIS COMP WITH TRANSVAG NON-OB (XPD): ICD-10-PCS | Mod: 26,,, | Performed by: RADIOLOGY

## 2023-03-16 NOTE — TELEPHONE ENCOUNTER
Spoke to patient, reassured that her blood work showed anemia per  (referral was placed pt seeing hematology soon), patient also aware her US showed a few fibroids , not alarming, nothing we have to see her in clinic about today. Patient feels a sense of relief. Would like to speak to  about next steps for fibroids, patient also informed me that after speaking to hem/onc staff, if they could get her in sooner she would appreciate that.     Msg routed to

## 2023-03-27 ENCOUNTER — PATIENT MESSAGE (OUTPATIENT)
Dept: OBSTETRICS AND GYNECOLOGY | Facility: CLINIC | Age: 48
End: 2023-03-27
Payer: COMMERCIAL

## 2023-03-27 ENCOUNTER — HOSPITAL ENCOUNTER (OUTPATIENT)
Dept: RADIOLOGY | Facility: OTHER | Age: 48
Discharge: HOME | End: 2023-03-27
Attending: OBSTETRICS & GYNECOLOGY
Payer: COMMERCIAL

## 2023-03-27 DIAGNOSIS — E04.1 THYROID NODULE: ICD-10-CM

## 2023-03-27 PROCEDURE — 76536 US THYROID: ICD-10-PCS | Mod: 26,,, | Performed by: INTERNAL MEDICINE

## 2023-03-27 PROCEDURE — 76536 US EXAM OF HEAD AND NECK: CPT | Mod: TC

## 2023-03-27 PROCEDURE — 76536 US EXAM OF HEAD AND NECK: CPT | Mod: 26,,, | Performed by: INTERNAL MEDICINE

## 2023-03-29 ENCOUNTER — LAB VISIT (OUTPATIENT)
Dept: LAB | Facility: OTHER | Age: 48
End: 2023-03-29
Attending: STUDENT IN AN ORGANIZED HEALTH CARE EDUCATION/TRAINING PROGRAM
Payer: COMMERCIAL

## 2023-03-29 ENCOUNTER — OFFICE VISIT (OUTPATIENT)
Dept: HEMATOLOGY/ONCOLOGY | Facility: CLINIC | Age: 48
End: 2023-03-29
Attending: OBSTETRICS & GYNECOLOGY
Payer: COMMERCIAL

## 2023-03-29 VITALS
OXYGEN SATURATION: 100 % | WEIGHT: 129 LBS | HEART RATE: 53 BPM | BODY MASS INDEX: 20.2 KG/M2 | SYSTOLIC BLOOD PRESSURE: 104 MMHG | TEMPERATURE: 98 F | DIASTOLIC BLOOD PRESSURE: 63 MMHG

## 2023-03-29 DIAGNOSIS — D64.9 ANEMIA, UNSPECIFIED TYPE: ICD-10-CM

## 2023-03-29 DIAGNOSIS — D70.8 OTHER NEUTROPENIA: ICD-10-CM

## 2023-03-29 DIAGNOSIS — D50.0 IRON DEFICIENCY ANEMIA DUE TO CHRONIC BLOOD LOSS: ICD-10-CM

## 2023-03-29 DIAGNOSIS — D64.9 SYMPTOMATIC ANEMIA: Primary | ICD-10-CM

## 2023-03-29 LAB
ABO + RH BLD: NORMAL
BASOPHILS # BLD AUTO: 0.03 K/UL (ref 0–0.2)
BASOPHILS NFR BLD: 1 % (ref 0–1.9)
BLD GP AB SCN CELLS X3 SERPL QL: NORMAL
DIFFERENTIAL METHOD: ABNORMAL
EOSINOPHIL # BLD AUTO: 0.1 K/UL (ref 0–0.5)
EOSINOPHIL NFR BLD: 2.7 % (ref 0–8)
ERYTHROCYTE [DISTWIDTH] IN BLOOD BY AUTOMATED COUNT: 19.1 % (ref 11.5–14.5)
HCT VFR BLD AUTO: 25.1 % (ref 37–48.5)
HGB BLD-MCNC: 6.4 G/DL (ref 12–16)
IMM GRANULOCYTES # BLD AUTO: 0.01 K/UL (ref 0–0.04)
IMM GRANULOCYTES NFR BLD AUTO: 0.3 % (ref 0–0.5)
LYMPHOCYTES # BLD AUTO: 1 K/UL (ref 1–4.8)
LYMPHOCYTES NFR BLD: 32.8 % (ref 18–48)
MCH RBC QN AUTO: 16.3 PG (ref 27–31)
MCHC RBC AUTO-ENTMCNC: 25.5 G/DL (ref 32–36)
MCV RBC AUTO: 64 FL (ref 82–98)
MONOCYTES # BLD AUTO: 0.5 K/UL (ref 0.3–1)
MONOCYTES NFR BLD: 16.7 % (ref 4–15)
NEUTROPHILS # BLD AUTO: 1.4 K/UL (ref 1.8–7.7)
NEUTROPHILS NFR BLD: 46.5 % (ref 38–73)
NRBC BLD-RTO: 0 /100 WBC
PLATELET # BLD AUTO: 323 K/UL (ref 150–450)
PMV BLD AUTO: 8.7 FL (ref 9.2–12.9)
RBC # BLD AUTO: 3.93 M/UL (ref 4–5.4)
SPECIMEN OUTDATE: NORMAL
VIT B12 SERPL-MCNC: 795 PG/ML (ref 210–950)
WBC # BLD AUTO: 2.93 K/UL (ref 3.9–12.7)

## 2023-03-29 PROCEDURE — 1160F PR REVIEW ALL MEDS BY PRESCRIBER/CLIN PHARMACIST DOCUMENTED: ICD-10-PCS | Mod: CPTII,S$GLB,, | Performed by: STUDENT IN AN ORGANIZED HEALTH CARE EDUCATION/TRAINING PROGRAM

## 2023-03-29 PROCEDURE — 82607 VITAMIN B-12: CPT | Performed by: STUDENT IN AN ORGANIZED HEALTH CARE EDUCATION/TRAINING PROGRAM

## 2023-03-29 PROCEDURE — 1159F MED LIST DOCD IN RCRD: CPT | Mod: CPTII,S$GLB,, | Performed by: STUDENT IN AN ORGANIZED HEALTH CARE EDUCATION/TRAINING PROGRAM

## 2023-03-29 PROCEDURE — 3044F PR MOST RECENT HEMOGLOBIN A1C LEVEL <7.0%: ICD-10-PCS | Mod: CPTII,S$GLB,, | Performed by: STUDENT IN AN ORGANIZED HEALTH CARE EDUCATION/TRAINING PROGRAM

## 2023-03-29 PROCEDURE — 3008F BODY MASS INDEX DOCD: CPT | Mod: CPTII,S$GLB,, | Performed by: STUDENT IN AN ORGANIZED HEALTH CARE EDUCATION/TRAINING PROGRAM

## 2023-03-29 PROCEDURE — 1159F PR MEDICATION LIST DOCUMENTED IN MEDICAL RECORD: ICD-10-PCS | Mod: CPTII,S$GLB,, | Performed by: STUDENT IN AN ORGANIZED HEALTH CARE EDUCATION/TRAINING PROGRAM

## 2023-03-29 PROCEDURE — 3044F HG A1C LEVEL LT 7.0%: CPT | Mod: CPTII,S$GLB,, | Performed by: STUDENT IN AN ORGANIZED HEALTH CARE EDUCATION/TRAINING PROGRAM

## 2023-03-29 PROCEDURE — 36415 COLL VENOUS BLD VENIPUNCTURE: CPT | Performed by: STUDENT IN AN ORGANIZED HEALTH CARE EDUCATION/TRAINING PROGRAM

## 2023-03-29 PROCEDURE — 3078F DIAST BP <80 MM HG: CPT | Mod: CPTII,S$GLB,, | Performed by: STUDENT IN AN ORGANIZED HEALTH CARE EDUCATION/TRAINING PROGRAM

## 2023-03-29 PROCEDURE — 3078F PR MOST RECENT DIASTOLIC BLOOD PRESSURE < 80 MM HG: ICD-10-PCS | Mod: CPTII,S$GLB,, | Performed by: STUDENT IN AN ORGANIZED HEALTH CARE EDUCATION/TRAINING PROGRAM

## 2023-03-29 PROCEDURE — 1160F RVW MEDS BY RX/DR IN RCRD: CPT | Mod: CPTII,S$GLB,, | Performed by: STUDENT IN AN ORGANIZED HEALTH CARE EDUCATION/TRAINING PROGRAM

## 2023-03-29 PROCEDURE — 85025 COMPLETE CBC W/AUTO DIFF WBC: CPT | Performed by: STUDENT IN AN ORGANIZED HEALTH CARE EDUCATION/TRAINING PROGRAM

## 2023-03-29 PROCEDURE — 99205 OFFICE O/P NEW HI 60 MIN: CPT | Mod: S$GLB,,, | Performed by: STUDENT IN AN ORGANIZED HEALTH CARE EDUCATION/TRAINING PROGRAM

## 2023-03-29 PROCEDURE — 3074F PR MOST RECENT SYSTOLIC BLOOD PRESSURE < 130 MM HG: ICD-10-PCS | Mod: CPTII,S$GLB,, | Performed by: STUDENT IN AN ORGANIZED HEALTH CARE EDUCATION/TRAINING PROGRAM

## 2023-03-29 PROCEDURE — 99205 PR OFFICE/OUTPT VISIT, NEW, LEVL V, 60-74 MIN: ICD-10-PCS | Mod: S$GLB,,, | Performed by: STUDENT IN AN ORGANIZED HEALTH CARE EDUCATION/TRAINING PROGRAM

## 2023-03-29 PROCEDURE — 99999 PR PBB SHADOW E&M-EST. PATIENT-LVL III: CPT | Mod: PBBFAC,,, | Performed by: STUDENT IN AN ORGANIZED HEALTH CARE EDUCATION/TRAINING PROGRAM

## 2023-03-29 PROCEDURE — 99999 PR PBB SHADOW E&M-EST. PATIENT-LVL III: ICD-10-PCS | Mod: PBBFAC,,, | Performed by: STUDENT IN AN ORGANIZED HEALTH CARE EDUCATION/TRAINING PROGRAM

## 2023-03-29 PROCEDURE — 3074F SYST BP LT 130 MM HG: CPT | Mod: CPTII,S$GLB,, | Performed by: STUDENT IN AN ORGANIZED HEALTH CARE EDUCATION/TRAINING PROGRAM

## 2023-03-29 PROCEDURE — 86900 BLOOD TYPING SEROLOGIC ABO: CPT | Performed by: STUDENT IN AN ORGANIZED HEALTH CARE EDUCATION/TRAINING PROGRAM

## 2023-03-29 PROCEDURE — 3008F PR BODY MASS INDEX (BMI) DOCUMENTED: ICD-10-PCS | Mod: CPTII,S$GLB,, | Performed by: STUDENT IN AN ORGANIZED HEALTH CARE EDUCATION/TRAINING PROGRAM

## 2023-03-29 RX ORDER — HEPARIN 100 UNIT/ML
5 SYRINGE INTRAVENOUS
Status: CANCELLED | OUTPATIENT
Start: 2023-03-29

## 2023-03-29 RX ORDER — SODIUM CHLORIDE 0.9 % (FLUSH) 0.9 %
10 SYRINGE (ML) INJECTION
Status: CANCELLED | OUTPATIENT
Start: 2023-03-29

## 2023-03-29 RX ORDER — SODIUM CHLORIDE 9 MG/ML
INJECTION, SOLUTION INTRAVENOUS CONTINUOUS
Status: CANCELLED | OUTPATIENT
Start: 2023-03-29

## 2023-03-29 NOTE — PROGRESS NOTES
Hematology- Oncology Clinic Note :      3/29/2023    RFV / chief complaint- Anemia        HPI  Pt is a 47 y.o. female who  has a past medical history of Breast disorder and Herpes simplex virus (HSV) infection.   Pt presents to the clinic today for anemia      C/o fatigue, OLMEDO . Feels dizzy when working out   Onset of symptoms was gradual starting several weeks ago with unchanged course since that time. Symptoms are mild to moderate in intensity.  Patient denies chest pain, LOC, falls. Symptoms are aggravated by exertion. Symptoms improve with rest.   Pica- ice  Bleeding- HMB ,   Oral iron - cause constipation  Appetite - good   Weight loss- none      No fhx of colon cancer, father with prostate cancer   No smoking. Social drinking. No illicit drugs   [place]     Reviewed past medical/surgical/social history    Past Medical History:   Diagnosis Date    Breast disorder     Fibroids.    Herpes simplex virus (HSV) infection     HSV2.      History reviewed. No pertinent surgical history.   Review of patient's allergies indicates:  No Known Allergies   Social History     Tobacco Use    Smoking status: Never    Smokeless tobacco: Never   Substance Use Topics    Alcohol use: Yes     Comment: Socially.      Family History   Problem Relation Age of Onset    Hypertension Father     Prostate cancer Father 68    Breast cancer Neg Hx     Colon cancer Neg Hx     Ovarian cancer Neg Hx           Review of Systems :  Review of Systems   Constitutional:  Positive for malaise/fatigue. Negative for chills, diaphoresis, fever and weight loss.   HENT: Negative.  Negative for congestion, hearing loss, nosebleeds, sore throat and tinnitus.    Eyes: Negative.  Negative for blurred vision and discharge.   Respiratory:  Positive for shortness of breath. Negative for cough, hemoptysis, sputum production and wheezing.    Cardiovascular:  Positive for palpitations. Negative for chest pain and leg swelling.   Gastrointestinal:  Negative.  Negative for abdominal pain, blood in stool, constipation, diarrhea, heartburn, melena, nausea and vomiting.   Genitourinary: Negative.    Musculoskeletal: Negative.  Negative for back pain, falls, joint pain and myalgias.   Skin: Negative.  Negative for itching and rash.   Neurological:  Positive for dizziness. Negative for tingling, sensory change, speech change, focal weakness, seizures, loss of consciousness, weakness and headaches.   Endo/Heme/Allergies: Negative.  Does not bruise/bleed easily.   Psychiatric/Behavioral: Negative.  Negative for depression. The patient is not nervous/anxious and does not have insomnia.              Physical Exam :  /63 (BP Location: Left arm, Patient Position: Sitting, BP Method: Medium (Automatic))   Pulse (!) 53   Temp 98 °F (36.7 °C) (Oral)   Wt 58.5 kg (128 lb 15.5 oz)   LMP 03/25/2023   SpO2 100%   BMI 20.20 kg/m²   Wt Readings from Last 3 Encounters:   03/29/23 58.5 kg (128 lb 15.5 oz)   03/13/23 56.9 kg (125 lb 7.1 oz)   07/22/21 56.1 kg (123 lb 10.9 oz)       Body mass index is 20.2 kg/m².      Physical Exam  Vitals and nursing note reviewed.   Constitutional:       General: She is not in acute distress.     Appearance: Normal appearance. She is not ill-appearing.   HENT:      Head: Normocephalic and atraumatic.      Right Ear: External ear normal.      Left Ear: External ear normal.      Mouth/Throat:      Pharynx: No oropharyngeal exudate.   Eyes:      General: No scleral icterus.        Right eye: No discharge.         Left eye: No discharge.   Cardiovascular:      Rate and Rhythm: Normal rate.   Pulmonary:      Effort: Pulmonary effort is normal. No respiratory distress.   Abdominal:      General: There is no distension.   Musculoskeletal:         General: No swelling or deformity.      Cervical back: Normal range of motion and neck supple.      Right lower leg: No edema.      Left lower leg: No edema.   Skin:     Coloration: Skin is not  jaundiced.      Findings: No bruising, erythema, lesion or rash.   Neurological:      General: No focal deficit present.      Mental Status: She is alert and oriented to person, place, and time. Mental status is at baseline.      Coordination: Coordination normal.      Gait: Gait normal.   Psychiatric:         Mood and Affect: Mood normal.         Behavior: Behavior normal.           Current Outpatient Medications   Medication Sig Dispense Refill    polyethylene glycol (GOLYTELY) 236-22.74-6.74 -5.86 gram suspension SMARTSIG:Milliliter(s) By Mouth      valACYclovir (VALTREX) 1000 MG tablet Take 1 tablet (1,000 mg total) by mouth every 12 (twelve) hours. 30 tablet 1     No current facility-administered medications for this visit.       Pertinent Diagnostic studies:      Lab Visit on 03/29/2023   Component Date Value Ref Range Status    WBC 03/29/2023 2.93 (L)  3.90 - 12.70 K/uL Final    RBC 03/29/2023 3.93 (L)  4.00 - 5.40 M/uL Final    Hemoglobin 03/29/2023 6.4 (L)  12.0 - 16.0 g/dL Final    Hematocrit 03/29/2023 25.1 (L)  37.0 - 48.5 % Final    MCV 03/29/2023 64 (L)  82 - 98 fL Final    MCH 03/29/2023 16.3 (L)  27.0 - 31.0 pg Final    MCHC 03/29/2023 25.5 (L)  32.0 - 36.0 g/dL Final    RDW 03/29/2023 19.1 (H)  11.5 - 14.5 % Final    Platelets 03/29/2023 323  150 - 450 K/uL Final    MPV 03/29/2023 8.7 (L)  9.2 - 12.9 fL Final    Immature Granulocytes 03/29/2023 0.3  0.0 - 0.5 % Final    Gran # (ANC) 03/29/2023 1.4 (L)  1.8 - 7.7 K/uL Final    Immature Grans (Abs) 03/29/2023 0.01  0.00 - 0.04 K/uL Final    Comment: Mild elevation in immature granulocytes is non specific and   can be seen in a variety of conditions including stress response,   acute inflammation, trauma and pregnancy. Correlation with other   laboratory and clinical findings is essential.      Lymph # 03/29/2023 1.0  1.0 - 4.8 K/uL Final    Mono # 03/29/2023 0.5  0.3 - 1.0 K/uL Final    Eos # 03/29/2023 0.1  0.0 - 0.5 K/uL Final     Baso # 03/29/2023 0.03  0.00 - 0.20 K/uL Final    nRBC 03/29/2023 0  0 /100 WBC Final    Gran % 03/29/2023 46.5  38.0 - 73.0 % Final    Lymph % 03/29/2023 32.8  18.0 - 48.0 % Final    Mono % 03/29/2023 16.7 (H)  4.0 - 15.0 % Final    Eosinophil % 03/29/2023 2.7  0.0 - 8.0 % Final    Basophil % 03/29/2023 1.0  0.0 - 1.9 % Final    Differential Method 03/29/2023 Automated   Final    Group & Rh 03/29/2023 O POS   Final    Indirect Celestina 03/29/2023 NEG   Final    Specimen Outdate 03/29/2023 04/01/2023 23:59   Final    Vitamin B-12 03/29/2023 795  210 - 950 pg/mL Final           Oncology History    No history exists.     Assessment :       1. Symptomatic anemia    2. Iron deficiency anemia due to chronic blood loss    3. Other neutropenia        Plan :       Iron def anemia ?HMB/ fibroids, has Cscope coming up  Chronic anemia worsened over years . Unable to tolerate iron pills due to severe constipation  Severe anemia, plan for injectafer 2 doses 1 week apart  Hand out on iron def and iron rich foods given to pt.   Blood transfusion advised, pt agreeable if hb <6.   ER precautions d/w pt     Discussed side effects of Injectafer (or venofer) which include but are not limited to infusion reaction, shortness of breath, hives, rash, flushing, itching, headaches, skin discoloration at the injection site, nausea, vomiting, low phosphorus level, elevated blood pressure, elevated liver enzymes, risks to the unborn baby if pregnant. Patient understands the risks and agrees with proceeding with Injectafer (or venofer).         Therapy Plan Information  Medications  ferric carboxymaltose (INJECTAFER) 750 mg in sodium chloride 0.9% 265 mL infusion  750 mg, Intravenous, 1 time a week  IV Fluids  0.9%  NaCl infusion  Intravenous, 1 time a week  Flushes  sodium chloride 0.9% flush 10 mL  10 mL, Intravenous, 1 time a week  heparin, porcine (PF) 100 unit/mL injection flush 500 Units  500 Units, Intravenous, 1 time a  week  sodium chloride 0.9% 100 mL flush bag  Intravenous, 1 time a week    MDM includes  :      One or more chronic illness with exacerbation,  progression  2 or more stable chronic illnesses  1 or more chronic illness with severe exacerbation, progression or side effects of treatment    Independent review and explanation of 3+ results from unique tests  Discussion of management and ordering 3+ unique tests  Independent interpretation of test completed by another healthcare professional   Discussion of management or test interpretation with another healthcare professional     Prescription drug management  Drug therapy requiring intensive monitoring  for toxicity  Decision regarding hospitalizations      Electronically signed by Concepcion De La Cruz    [facility]      Future Appointments   Date Time Provider Department Center   4/5/2023  9:00 AM Nola Juan DO [id number]           This note was created with voice recognition software.  Grammatical, syntax and spelling errors may be inevitable.

## 2023-04-04 ENCOUNTER — TELEPHONE (OUTPATIENT)
Dept: HEMATOLOGY/ONCOLOGY | Facility: CLINIC | Age: 48
End: 2023-04-04
Payer: COMMERCIAL

## 2023-04-04 NOTE — TELEPHONE ENCOUNTER
Spoke to Pt to see what other questions she may have had for . She stated that she was waiting on someone to call her in regards to her infusion. I assured her that once her insurance approves her treatment that she should be getting a call to get scheduled.     Roxana STONE

## 2023-04-05 ENCOUNTER — OFFICE VISIT (OUTPATIENT)
Dept: OBSTETRICS AND GYNECOLOGY | Facility: CLINIC | Age: 48
End: 2023-04-05
Attending: OBSTETRICS & GYNECOLOGY
Payer: COMMERCIAL

## 2023-04-05 DIAGNOSIS — D50.0 IRON DEFICIENCY ANEMIA DUE TO CHRONIC BLOOD LOSS: Primary | ICD-10-CM

## 2023-04-05 DIAGNOSIS — D25.9 UTERINE LEIOMYOMA, UNSPECIFIED LOCATION: ICD-10-CM

## 2023-04-05 PROCEDURE — 99213 OFFICE O/P EST LOW 20 MIN: CPT | Mod: 95,,, | Performed by: OBSTETRICS & GYNECOLOGY

## 2023-04-05 PROCEDURE — 3044F HG A1C LEVEL LT 7.0%: CPT | Mod: CPTII,95,, | Performed by: OBSTETRICS & GYNECOLOGY

## 2023-04-05 PROCEDURE — 3044F PR MOST RECENT HEMOGLOBIN A1C LEVEL <7.0%: ICD-10-PCS | Mod: CPTII,95,, | Performed by: OBSTETRICS & GYNECOLOGY

## 2023-04-05 PROCEDURE — 99213 PR OFFICE/OUTPT VISIT, EST, LEVL III, 20-29 MIN: ICD-10-PCS | Mod: 95,,, | Performed by: OBSTETRICS & GYNECOLOGY

## 2023-04-05 NOTE — PROGRESS NOTES
The patient location is: home  The chief complaint leading to consultation is: anemia and F/U U/S    Visit type: audiovisual    Face to Face time with patient: 25  total minutes of total time spent on the encounter, which includes face to face time and non-face to face time preparing to see the patient (eg, review of tests), Obtaining and/or reviewing separately obtained history, Documenting clinical information in the electronic or other health record, Independently interpreting results (not separately reported) and communicating results to the patient/family/caregiver, or Care coordination (not separately reported).         Each patient to whom he or she provides medical services by telemedicine is:  (1) informed of the relationship between the physician and patient and the respective role of any other health care provider with respect to management of the patient; and (2) notified that he or she may decline to receive medical services by telemedicine and may withdraw from such care at any time.    Notes:   CC: Heavy bleeding--> severe     HPI:Surinder Elizabeth is a 48 yo female who presents for a virtual f/u after routine labs demonstrated anemia, subsequent Iron studies and repeat CBC showed severe iron deficiency.  She has seen Hematology and had a pelvic ultrasound and presents today to discuss options for management of findings of both.    ROS:  GENERAL: Feeling well overall. Denies fever or chills.   SKIN: Denies rash or lesions.   HEAD: Denies head injury or headache.   NODES: Denies enlarged lymph nodes.   CHEST: Denies chest pain or shortness of breath.   CARDIOVASCULAR: Denies palpitations or left sided chest pain.   ABDOMEN: No abdominal pain, constipation, diarrhea, nausea, vomiting or rectal bleeding.   URINARY: No dysuria, hematuria, or burning on urination.  REPRODUCTIVE: See HPI.   BREASTS: Denies pain, lumps, or nipple discharge.   HEMATOLOGIC: No easy bruisability or excessive bleeding.    MUSCULOSKELETAL: Denies joint pain or swelling.   NEUROLOGIC: some fatigue, denies syncope   PSYCHIATRIC: Denies depression, anxiety or mood swings.    PE:   APPEARANCE: Well nourished, well developed,  female in no acute distress.      Narrative & Impression  EXAMINATION:  US PELVIS COMP WITH TRANSVAG NON-OB (XPD)     CLINICAL HISTORY:  Abnormal uterine and vaginal bleeding, unspecified     TECHNIQUE:  Transabdominal sonography of the pelvis was performed, followed by transvaginal sonography to better evaluate the uterus and ovaries.     COMPARISON:  None.     FINDINGS:  Uterus:     Size: 10.2 x 6.1 x 6.8 cm     Masses: Multiple fibroids are present.  There is a submucosal fibroid measuring proximally 3.1 cm.  Subserosal fibroid near the fundus measures 5.1 cm.  Intramural fibroids measure 3.1 cm and 3.0 cm in the posterior and anterior uterus, respectively     Endometrium: Normal in this pre menopausal patient, measuring 14 mm.     Right ovary:     Size: 2.8 x 2.2 x 2.7 cm     Appearance: Involuting cyst/follicle 1.6 cm.     Vascular flow: Normal     Left ovary:     Size: 2.7 x 1.3 x 2.1 cm     Appearance: Normal     Vascular Flow: Normal.     Free Fluid:     None.     Impression:     Enlarged, fibroid uterus.       Diagnosis:  1. Iron deficiency anemia due to chronic blood loss    2. Uterine leiomyoma, unspecified location        Plan:   Discussed options for management of anemia and heavy bleeding, fibroid uterus.   -hyst   -myomectomy   -UFE   -myfembree   Patient will consider her options and reach out with questions/ decisions.      Follow-up with me in ROLO Juan DO

## 2023-04-12 ENCOUNTER — TELEPHONE (OUTPATIENT)
Dept: INFUSION THERAPY | Facility: OTHER | Age: 48
End: 2023-04-12
Payer: COMMERCIAL

## 2023-04-17 ENCOUNTER — TELEPHONE (OUTPATIENT)
Dept: INFUSION THERAPY | Facility: OTHER | Age: 48
End: 2023-04-17
Payer: COMMERCIAL

## 2023-05-02 ENCOUNTER — INFUSION (OUTPATIENT)
Dept: INFUSION THERAPY | Facility: OTHER | Age: 48
End: 2023-05-02
Attending: STUDENT IN AN ORGANIZED HEALTH CARE EDUCATION/TRAINING PROGRAM
Payer: COMMERCIAL

## 2023-05-02 VITALS
RESPIRATION RATE: 17 BRPM | OXYGEN SATURATION: 100 % | SYSTOLIC BLOOD PRESSURE: 127 MMHG | DIASTOLIC BLOOD PRESSURE: 60 MMHG | HEART RATE: 66 BPM | TEMPERATURE: 98 F

## 2023-05-02 DIAGNOSIS — D50.0 IRON DEFICIENCY ANEMIA DUE TO CHRONIC BLOOD LOSS: Primary | ICD-10-CM

## 2023-05-02 PROCEDURE — 63600175 PHARM REV CODE 636 W HCPCS: Performed by: STUDENT IN AN ORGANIZED HEALTH CARE EDUCATION/TRAINING PROGRAM

## 2023-05-02 PROCEDURE — 25000003 PHARM REV CODE 250: Performed by: STUDENT IN AN ORGANIZED HEALTH CARE EDUCATION/TRAINING PROGRAM

## 2023-05-02 PROCEDURE — 96365 THER/PROPH/DIAG IV INF INIT: CPT

## 2023-05-02 RX ORDER — SODIUM CHLORIDE 0.9 % (FLUSH) 0.9 %
10 SYRINGE (ML) INJECTION
Status: DISCONTINUED | OUTPATIENT
Start: 2023-05-02 | End: 2023-05-02 | Stop reason: HOSPADM

## 2023-05-02 RX ORDER — SODIUM CHLORIDE 0.9 % (FLUSH) 0.9 %
10 SYRINGE (ML) INJECTION
Status: CANCELLED | OUTPATIENT
Start: 2023-05-02

## 2023-05-02 RX ORDER — HEPARIN 100 UNIT/ML
500 SYRINGE INTRAVENOUS
Status: DISCONTINUED | OUTPATIENT
Start: 2023-05-02 | End: 2023-05-02 | Stop reason: HOSPADM

## 2023-05-02 RX ORDER — HEPARIN 100 UNIT/ML
500 SYRINGE INTRAVENOUS
Status: CANCELLED | OUTPATIENT
Start: 2023-05-02

## 2023-05-02 RX ADMIN — SODIUM CHLORIDE: 9 INJECTION, SOLUTION INTRAVENOUS at 10:05

## 2023-05-02 RX ADMIN — IRON SUCROSE 100 MG: 20 INJECTION, SOLUTION INTRAVENOUS at 10:05

## 2023-05-02 NOTE — PLAN OF CARE
Vital Signs Stable, No apparent distress noted; Pt tolerated _Venofer  infusion w/o difficulty.  AVS/Discharge instructions given-all questions answered ;Pt verbalizes understanding.   Next appointments scheduled and sent via [employer]; ambulated from clinic in NAD

## 2023-05-09 ENCOUNTER — INFUSION (OUTPATIENT)
Dept: INFUSION THERAPY | Facility: OTHER | Age: 48
End: 2023-05-09
Attending: STUDENT IN AN ORGANIZED HEALTH CARE EDUCATION/TRAINING PROGRAM
Payer: COMMERCIAL

## 2023-05-09 VITALS
RESPIRATION RATE: 17 BRPM | OXYGEN SATURATION: 100 % | HEART RATE: 72 BPM | TEMPERATURE: 98 F | DIASTOLIC BLOOD PRESSURE: 69 MMHG | SYSTOLIC BLOOD PRESSURE: 134 MMHG

## 2023-05-09 DIAGNOSIS — D50.0 IRON DEFICIENCY ANEMIA DUE TO CHRONIC BLOOD LOSS: Primary | ICD-10-CM

## 2023-05-09 PROCEDURE — 63600175 PHARM REV CODE 636 W HCPCS: Performed by: STUDENT IN AN ORGANIZED HEALTH CARE EDUCATION/TRAINING PROGRAM

## 2023-05-09 PROCEDURE — 25000003 PHARM REV CODE 250: Performed by: STUDENT IN AN ORGANIZED HEALTH CARE EDUCATION/TRAINING PROGRAM

## 2023-05-09 PROCEDURE — 96365 THER/PROPH/DIAG IV INF INIT: CPT

## 2023-05-09 RX ORDER — HEPARIN 100 UNIT/ML
500 SYRINGE INTRAVENOUS
Status: CANCELLED | OUTPATIENT
Start: 2023-05-09

## 2023-05-09 RX ORDER — SODIUM CHLORIDE 0.9 % (FLUSH) 0.9 %
10 SYRINGE (ML) INJECTION
Status: CANCELLED | OUTPATIENT
Start: 2023-05-09

## 2023-05-09 RX ORDER — HEPARIN 100 UNIT/ML
500 SYRINGE INTRAVENOUS
Status: DISCONTINUED | OUTPATIENT
Start: 2023-05-09 | End: 2023-05-09 | Stop reason: HOSPADM

## 2023-05-09 RX ORDER — SODIUM CHLORIDE 0.9 % (FLUSH) 0.9 %
10 SYRINGE (ML) INJECTION
Status: DISCONTINUED | OUTPATIENT
Start: 2023-05-09 | End: 2023-05-09 | Stop reason: HOSPADM

## 2023-05-09 RX ADMIN — IRON SUCROSE 100 MG: 20 INJECTION, SOLUTION INTRAVENOUS at 02:05

## 2023-05-09 RX ADMIN — SODIUM CHLORIDE: 9 INJECTION, SOLUTION INTRAVENOUS at 02:05

## 2023-05-09 NOTE — PLAN OF CARE
Vital Signs Stable, No apparent distress noted; Pt tolerated Venofer __ infusion w/o difficulty.  AVS/Discharge instructions given/all questions answered;Pt verbalizes understanding. Next appointments scheduled; ambulated from clinic in NAD, accompanied by friend/family

## 2023-05-16 ENCOUNTER — INFUSION (OUTPATIENT)
Dept: INFUSION THERAPY | Facility: OTHER | Age: 48
End: 2023-05-16
Attending: STUDENT IN AN ORGANIZED HEALTH CARE EDUCATION/TRAINING PROGRAM
Payer: COMMERCIAL

## 2023-05-16 VITALS
DIASTOLIC BLOOD PRESSURE: 60 MMHG | HEART RATE: 75 BPM | RESPIRATION RATE: 16 BRPM | TEMPERATURE: 98 F | OXYGEN SATURATION: 100 % | SYSTOLIC BLOOD PRESSURE: 119 MMHG

## 2023-05-16 DIAGNOSIS — D50.0 IRON DEFICIENCY ANEMIA DUE TO CHRONIC BLOOD LOSS: Primary | ICD-10-CM

## 2023-05-16 PROCEDURE — 25000003 PHARM REV CODE 250: Performed by: STUDENT IN AN ORGANIZED HEALTH CARE EDUCATION/TRAINING PROGRAM

## 2023-05-16 PROCEDURE — 96365 THER/PROPH/DIAG IV INF INIT: CPT

## 2023-05-16 PROCEDURE — 63600175 PHARM REV CODE 636 W HCPCS: Performed by: STUDENT IN AN ORGANIZED HEALTH CARE EDUCATION/TRAINING PROGRAM

## 2023-05-16 RX ORDER — SODIUM CHLORIDE 0.9 % (FLUSH) 0.9 %
10 SYRINGE (ML) INJECTION
Status: CANCELLED | OUTPATIENT
Start: 2023-05-16

## 2023-05-16 RX ORDER — SODIUM CHLORIDE 0.9 % (FLUSH) 0.9 %
10 SYRINGE (ML) INJECTION
Status: DISCONTINUED | OUTPATIENT
Start: 2023-05-16 | End: 2023-05-16 | Stop reason: HOSPADM

## 2023-05-16 RX ORDER — HEPARIN 100 UNIT/ML
500 SYRINGE INTRAVENOUS
Status: DISCONTINUED | OUTPATIENT
Start: 2023-05-16 | End: 2023-05-16 | Stop reason: HOSPADM

## 2023-05-16 RX ORDER — HEPARIN 100 UNIT/ML
500 SYRINGE INTRAVENOUS
Status: CANCELLED | OUTPATIENT
Start: 2023-05-16

## 2023-05-16 RX ADMIN — IRON SUCROSE 100 MG: 20 INJECTION, SOLUTION INTRAVENOUS at 10:05

## 2023-05-16 RX ADMIN — SODIUM CHLORIDE: 9 INJECTION, SOLUTION INTRAVENOUS at 10:05

## 2023-05-16 NOTE — PLAN OF CARE
Venofer infusion administered, no reaction. Patient tolerated well. Patient accompanied by mother for discharge home. 24 gauge IV removed, catheter intact. No apparent distress noted. Discharge instructions given to patient. Patient understands instructions. Follow up appointment scheduled.

## 2023-05-23 ENCOUNTER — TELEPHONE (OUTPATIENT)
Dept: OBSTETRICS AND GYNECOLOGY | Facility: CLINIC | Age: 48
End: 2023-05-23
Payer: COMMERCIAL

## 2023-05-23 ENCOUNTER — INFUSION (OUTPATIENT)
Dept: INFUSION THERAPY | Facility: OTHER | Age: 48
End: 2023-05-23
Attending: STUDENT IN AN ORGANIZED HEALTH CARE EDUCATION/TRAINING PROGRAM
Payer: COMMERCIAL

## 2023-05-23 VITALS
OXYGEN SATURATION: 100 % | SYSTOLIC BLOOD PRESSURE: 107 MMHG | HEART RATE: 78 BPM | TEMPERATURE: 98 F | RESPIRATION RATE: 16 BRPM | DIASTOLIC BLOOD PRESSURE: 59 MMHG

## 2023-05-23 DIAGNOSIS — Z12.39 SCREENING BREAST EXAMINATION: Primary | ICD-10-CM

## 2023-05-23 DIAGNOSIS — D50.0 IRON DEFICIENCY ANEMIA DUE TO CHRONIC BLOOD LOSS: Primary | ICD-10-CM

## 2023-05-23 PROCEDURE — 63600175 PHARM REV CODE 636 W HCPCS: Performed by: STUDENT IN AN ORGANIZED HEALTH CARE EDUCATION/TRAINING PROGRAM

## 2023-05-23 PROCEDURE — 25000003 PHARM REV CODE 250: Performed by: STUDENT IN AN ORGANIZED HEALTH CARE EDUCATION/TRAINING PROGRAM

## 2023-05-23 PROCEDURE — 96365 THER/PROPH/DIAG IV INF INIT: CPT

## 2023-05-23 RX ORDER — SODIUM CHLORIDE 0.9 % (FLUSH) 0.9 %
10 SYRINGE (ML) INJECTION
Status: DISCONTINUED | OUTPATIENT
Start: 2023-05-23 | End: 2023-05-23 | Stop reason: HOSPADM

## 2023-05-23 RX ORDER — HEPARIN 100 UNIT/ML
500 SYRINGE INTRAVENOUS
Status: DISCONTINUED | OUTPATIENT
Start: 2023-05-23 | End: 2023-05-23 | Stop reason: HOSPADM

## 2023-05-23 RX ORDER — HEPARIN 100 UNIT/ML
500 SYRINGE INTRAVENOUS
Status: CANCELLED | OUTPATIENT
Start: 2023-05-23

## 2023-05-23 RX ORDER — SODIUM CHLORIDE 0.9 % (FLUSH) 0.9 %
10 SYRINGE (ML) INJECTION
Status: CANCELLED | OUTPATIENT
Start: 2023-05-23

## 2023-05-23 RX ADMIN — IRON SUCROSE 100 MG: 20 INJECTION, SOLUTION INTRAVENOUS at 11:05

## 2023-05-23 RX ADMIN — SODIUM CHLORIDE: 9 INJECTION, SOLUTION INTRAVENOUS at 11:05

## 2023-05-23 NOTE — PLAN OF CARE
Vital Signs Stable, No apparent distress noted; Pt tolerated _Venofer w/o difficulty.  AVS/Discharge instructions given/all questions answered ;Pt verbalizes understanding. Next appointments scheduled, ambulated from clinic in NAD

## 2023-06-06 ENCOUNTER — INFUSION (OUTPATIENT)
Dept: INFUSION THERAPY | Facility: OTHER | Age: 48
End: 2023-06-06
Attending: STUDENT IN AN ORGANIZED HEALTH CARE EDUCATION/TRAINING PROGRAM
Payer: COMMERCIAL

## 2023-06-06 VITALS
TEMPERATURE: 99 F | HEART RATE: 64 BPM | SYSTOLIC BLOOD PRESSURE: 134 MMHG | OXYGEN SATURATION: 100 % | DIASTOLIC BLOOD PRESSURE: 81 MMHG | RESPIRATION RATE: 16 BRPM

## 2023-06-06 DIAGNOSIS — D50.0 IRON DEFICIENCY ANEMIA DUE TO CHRONIC BLOOD LOSS: Primary | ICD-10-CM

## 2023-06-06 PROCEDURE — 25000003 PHARM REV CODE 250: Performed by: STUDENT IN AN ORGANIZED HEALTH CARE EDUCATION/TRAINING PROGRAM

## 2023-06-06 PROCEDURE — 63600175 PHARM REV CODE 636 W HCPCS: Performed by: STUDENT IN AN ORGANIZED HEALTH CARE EDUCATION/TRAINING PROGRAM

## 2023-06-06 PROCEDURE — 96365 THER/PROPH/DIAG IV INF INIT: CPT

## 2023-06-06 RX ORDER — HEPARIN 100 UNIT/ML
500 SYRINGE INTRAVENOUS
Status: CANCELLED | OUTPATIENT
Start: 2023-06-06

## 2023-06-06 RX ORDER — SODIUM CHLORIDE 0.9 % (FLUSH) 0.9 %
10 SYRINGE (ML) INJECTION
Status: CANCELLED | OUTPATIENT
Start: 2023-06-06

## 2023-06-06 RX ORDER — HEPARIN 100 UNIT/ML
500 SYRINGE INTRAVENOUS
Status: DISCONTINUED | OUTPATIENT
Start: 2023-06-06 | End: 2023-06-06 | Stop reason: HOSPADM

## 2023-06-06 RX ORDER — SODIUM CHLORIDE 0.9 % (FLUSH) 0.9 %
10 SYRINGE (ML) INJECTION
Status: DISCONTINUED | OUTPATIENT
Start: 2023-06-06 | End: 2023-06-06 | Stop reason: HOSPADM

## 2023-06-06 RX ADMIN — SODIUM CHLORIDE: 9 INJECTION, SOLUTION INTRAVENOUS at 11:06

## 2023-06-06 RX ADMIN — IRON SUCROSE 100 MG: 20 INJECTION, SOLUTION INTRAVENOUS at 11:06

## 2023-06-30 ENCOUNTER — LAB VISIT (OUTPATIENT)
Dept: LAB | Facility: OTHER | Age: 48
End: 2023-06-30
Attending: STUDENT IN AN ORGANIZED HEALTH CARE EDUCATION/TRAINING PROGRAM
Payer: COMMERCIAL

## 2023-06-30 DIAGNOSIS — D64.9 SYMPTOMATIC ANEMIA: ICD-10-CM

## 2023-06-30 LAB
BASOPHILS # BLD AUTO: 0.02 K/UL (ref 0–0.2)
BASOPHILS NFR BLD: 0.7 % (ref 0–1.9)
DIFFERENTIAL METHOD: ABNORMAL
EOSINOPHIL # BLD AUTO: 0 K/UL (ref 0–0.5)
EOSINOPHIL NFR BLD: 1.4 % (ref 0–8)
ERYTHROCYTE [DISTWIDTH] IN BLOOD BY AUTOMATED COUNT: 22.5 % (ref 11.5–14.5)
FERRITIN SERPL-MCNC: 5 NG/ML (ref 20–300)
HCT VFR BLD AUTO: 30.5 % (ref 37–48.5)
HGB BLD-MCNC: 8.4 G/DL (ref 12–16)
IMM GRANULOCYTES # BLD AUTO: 0.01 K/UL (ref 0–0.04)
IMM GRANULOCYTES NFR BLD AUTO: 0.3 % (ref 0–0.5)
IRON SERPL-MCNC: 20 UG/DL (ref 30–160)
LYMPHOCYTES # BLD AUTO: 0.9 K/UL (ref 1–4.8)
LYMPHOCYTES NFR BLD: 30 % (ref 18–48)
MCH RBC QN AUTO: 20.3 PG (ref 27–31)
MCHC RBC AUTO-ENTMCNC: 27.5 G/DL (ref 32–36)
MCV RBC AUTO: 74 FL (ref 82–98)
MONOCYTES # BLD AUTO: 0.4 K/UL (ref 0.3–1)
MONOCYTES NFR BLD: 14.1 % (ref 4–15)
NEUTROPHILS # BLD AUTO: 1.6 K/UL (ref 1.8–7.7)
NEUTROPHILS NFR BLD: 53.5 % (ref 38–73)
NRBC BLD-RTO: 0 /100 WBC
PLATELET # BLD AUTO: 276 K/UL (ref 150–450)
PMV BLD AUTO: 8.1 FL (ref 9.2–12.9)
RBC # BLD AUTO: 4.13 M/UL (ref 4–5.4)
SATURATED IRON: 4 % (ref 20–50)
TOTAL IRON BINDING CAPACITY: 463 UG/DL (ref 250–450)
TRANSFERRIN SERPL-MCNC: 313 MG/DL (ref 200–375)
WBC # BLD AUTO: 2.9 K/UL (ref 3.9–12.7)

## 2023-06-30 PROCEDURE — 84466 ASSAY OF TRANSFERRIN: CPT | Performed by: STUDENT IN AN ORGANIZED HEALTH CARE EDUCATION/TRAINING PROGRAM

## 2023-06-30 PROCEDURE — 82728 ASSAY OF FERRITIN: CPT | Performed by: STUDENT IN AN ORGANIZED HEALTH CARE EDUCATION/TRAINING PROGRAM

## 2023-06-30 PROCEDURE — 36415 COLL VENOUS BLD VENIPUNCTURE: CPT | Performed by: STUDENT IN AN ORGANIZED HEALTH CARE EDUCATION/TRAINING PROGRAM

## 2023-06-30 PROCEDURE — 85025 COMPLETE CBC W/AUTO DIFF WBC: CPT | Performed by: STUDENT IN AN ORGANIZED HEALTH CARE EDUCATION/TRAINING PROGRAM

## 2023-07-13 ENCOUNTER — OFFICE VISIT (OUTPATIENT)
Dept: HEMATOLOGY/ONCOLOGY | Facility: CLINIC | Age: 48
End: 2023-07-13
Payer: COMMERCIAL

## 2023-07-13 VITALS
BODY MASS INDEX: 19.93 KG/M2 | DIASTOLIC BLOOD PRESSURE: 84 MMHG | HEIGHT: 67 IN | OXYGEN SATURATION: 100 % | SYSTOLIC BLOOD PRESSURE: 138 MMHG | TEMPERATURE: 98 F | WEIGHT: 127 LBS | RESPIRATION RATE: 18 BRPM | HEART RATE: 73 BPM

## 2023-07-13 DIAGNOSIS — D70.8 OTHER NEUTROPENIA: ICD-10-CM

## 2023-07-13 DIAGNOSIS — D64.9 SYMPTOMATIC ANEMIA: Primary | ICD-10-CM

## 2023-07-13 DIAGNOSIS — D50.0 IRON DEFICIENCY ANEMIA DUE TO CHRONIC BLOOD LOSS: ICD-10-CM

## 2023-07-13 PROCEDURE — 3079F DIAST BP 80-89 MM HG: CPT | Mod: CPTII,S$GLB,, | Performed by: STUDENT IN AN ORGANIZED HEALTH CARE EDUCATION/TRAINING PROGRAM

## 2023-07-13 PROCEDURE — 99214 PR OFFICE/OUTPT VISIT, EST, LEVL IV, 30-39 MIN: ICD-10-PCS | Mod: S$GLB,,, | Performed by: STUDENT IN AN ORGANIZED HEALTH CARE EDUCATION/TRAINING PROGRAM

## 2023-07-13 PROCEDURE — 99214 OFFICE O/P EST MOD 30 MIN: CPT | Mod: S$GLB,,, | Performed by: STUDENT IN AN ORGANIZED HEALTH CARE EDUCATION/TRAINING PROGRAM

## 2023-07-13 PROCEDURE — 3079F PR MOST RECENT DIASTOLIC BLOOD PRESSURE 80-89 MM HG: ICD-10-PCS | Mod: CPTII,S$GLB,, | Performed by: STUDENT IN AN ORGANIZED HEALTH CARE EDUCATION/TRAINING PROGRAM

## 2023-07-13 PROCEDURE — 1159F MED LIST DOCD IN RCRD: CPT | Mod: CPTII,S$GLB,, | Performed by: STUDENT IN AN ORGANIZED HEALTH CARE EDUCATION/TRAINING PROGRAM

## 2023-07-13 PROCEDURE — 1159F PR MEDICATION LIST DOCUMENTED IN MEDICAL RECORD: ICD-10-PCS | Mod: CPTII,S$GLB,, | Performed by: STUDENT IN AN ORGANIZED HEALTH CARE EDUCATION/TRAINING PROGRAM

## 2023-07-13 PROCEDURE — 3008F PR BODY MASS INDEX (BMI) DOCUMENTED: ICD-10-PCS | Mod: CPTII,S$GLB,, | Performed by: STUDENT IN AN ORGANIZED HEALTH CARE EDUCATION/TRAINING PROGRAM

## 2023-07-13 PROCEDURE — 1160F PR REVIEW ALL MEDS BY PRESCRIBER/CLIN PHARMACIST DOCUMENTED: ICD-10-PCS | Mod: CPTII,S$GLB,, | Performed by: STUDENT IN AN ORGANIZED HEALTH CARE EDUCATION/TRAINING PROGRAM

## 2023-07-13 PROCEDURE — 99999 PR PBB SHADOW E&M-EST. PATIENT-LVL III: CPT | Mod: PBBFAC,,, | Performed by: STUDENT IN AN ORGANIZED HEALTH CARE EDUCATION/TRAINING PROGRAM

## 2023-07-13 PROCEDURE — 3008F BODY MASS INDEX DOCD: CPT | Mod: CPTII,S$GLB,, | Performed by: STUDENT IN AN ORGANIZED HEALTH CARE EDUCATION/TRAINING PROGRAM

## 2023-07-13 PROCEDURE — 3075F PR MOST RECENT SYSTOLIC BLOOD PRESS GE 130-139MM HG: ICD-10-PCS | Mod: CPTII,S$GLB,, | Performed by: STUDENT IN AN ORGANIZED HEALTH CARE EDUCATION/TRAINING PROGRAM

## 2023-07-13 PROCEDURE — 1160F RVW MEDS BY RX/DR IN RCRD: CPT | Mod: CPTII,S$GLB,, | Performed by: STUDENT IN AN ORGANIZED HEALTH CARE EDUCATION/TRAINING PROGRAM

## 2023-07-13 PROCEDURE — 3075F SYST BP GE 130 - 139MM HG: CPT | Mod: CPTII,S$GLB,, | Performed by: STUDENT IN AN ORGANIZED HEALTH CARE EDUCATION/TRAINING PROGRAM

## 2023-07-13 PROCEDURE — 3044F PR MOST RECENT HEMOGLOBIN A1C LEVEL <7.0%: ICD-10-PCS | Mod: CPTII,S$GLB,, | Performed by: STUDENT IN AN ORGANIZED HEALTH CARE EDUCATION/TRAINING PROGRAM

## 2023-07-13 PROCEDURE — 99999 PR PBB SHADOW E&M-EST. PATIENT-LVL III: ICD-10-PCS | Mod: PBBFAC,,, | Performed by: STUDENT IN AN ORGANIZED HEALTH CARE EDUCATION/TRAINING PROGRAM

## 2023-07-13 PROCEDURE — 3044F HG A1C LEVEL LT 7.0%: CPT | Mod: CPTII,S$GLB,, | Performed by: STUDENT IN AN ORGANIZED HEALTH CARE EDUCATION/TRAINING PROGRAM

## 2023-07-13 RX ORDER — DIPHENHYDRAMINE HYDROCHLORIDE 50 MG/ML
50 INJECTION INTRAMUSCULAR; INTRAVENOUS ONCE AS NEEDED
Status: CANCELLED | OUTPATIENT
Start: 2023-07-13

## 2023-07-13 RX ORDER — EPINEPHRINE 0.3 MG/.3ML
0.3 INJECTION SUBCUTANEOUS ONCE AS NEEDED
Status: CANCELLED | OUTPATIENT
Start: 2023-07-13

## 2023-07-13 RX ORDER — HEPARIN 100 UNIT/ML
5 SYRINGE INTRAVENOUS
Status: CANCELLED | OUTPATIENT
Start: 2023-07-13

## 2023-07-13 RX ORDER — SODIUM CHLORIDE 0.9 % (FLUSH) 0.9 %
10 SYRINGE (ML) INJECTION
Status: CANCELLED | OUTPATIENT
Start: 2023-07-13

## 2023-07-13 RX ORDER — SODIUM CHLORIDE 9 MG/ML
INJECTION, SOLUTION INTRAVENOUS CONTINUOUS
Status: CANCELLED | OUTPATIENT
Start: 2023-07-13

## 2023-07-13 NOTE — PROGRESS NOTES
Hematology- Oncology Clinic Note :      7/13/2023    RFV / chief complaint- Anemia        HPI  Pt is a 48 y.o. female who  has a past medical history of Breast disorder and Herpes simplex virus (HSV) infection.   Pt presents to the clinic today for anemia      C/o fatigue, OLMEDO better but still present. She has been trying to incorporate iron rich foods in her diet  Pica- ice  Bleeding- HMB ,   Oral iron - cause constipation  Appetite - good   Weight loss- none    ONCBCN ALL PLAN MEDICATIONS 5/2/2023 5/2/2023 5/9/2023 5/9/2023   iron sucrose (VENOFER)  mg  100 mg      ONCBCN ALL PLAN MEDICATIONS 5/16/2023 5/16/2023 5/23/2023 5/23/2023   iron sucrose (VENOFER)  mg  100 mg      ONCBCN ALL PLAN MEDICATIONS 6/6/2023   iron sucrose (VENOFER)  mg     No fhx of colon cancer, father with prostate cancer   No smoking. Social drinking. No illicit drugs   [place]     Reviewed past medical/surgical/social history    Past Medical History:   Diagnosis Date    Breast disorder     Fibroids.    Herpes simplex virus (HSV) infection     HSV2.      History reviewed. No pertinent surgical history.   Review of patient's allergies indicates:  No Known Allergies   Social History     Tobacco Use    Smoking status: Never    Smokeless tobacco: Never   Substance Use Topics    Alcohol use: Yes     Comment: Socially.      Family History   Problem Relation Age of Onset    Hypertension Father     Prostate cancer Father 68    Breast cancer Neg Hx     Colon cancer Neg Hx     Ovarian cancer Neg Hx           Review of Systems :  Review of Systems   Constitutional:  Positive for malaise/fatigue. Negative for chills, diaphoresis, fever and weight loss.   HENT: Negative.  Negative for congestion, hearing loss, nosebleeds, sore throat and tinnitus.    Eyes: Negative.  Negative for blurred vision and discharge.   Respiratory:  Positive for shortness of breath. Negative for cough, hemoptysis, sputum production and wheezing.   "  Cardiovascular:  Negative for chest pain, palpitations and leg swelling.   Gastrointestinal: Negative.  Negative for abdominal pain, blood in stool, constipation, diarrhea, heartburn, melena, nausea and vomiting.   Genitourinary: Negative.    Musculoskeletal: Negative.  Negative for back pain, falls, joint pain and myalgias.   Skin: Negative.  Negative for itching and rash.   Neurological:  Negative for dizziness, tingling, sensory change, speech change, focal weakness, seizures, loss of consciousness, weakness and headaches.   Endo/Heme/Allergies: Negative.  Does not bruise/bleed easily.   Psychiatric/Behavioral: Negative.  Negative for depression. The patient is not nervous/anxious and does not have insomnia.              Physical Exam :  /84 (BP Location: Left arm, Patient Position: Sitting, BP Method: Small (Automatic))   Pulse 73   Temp 98.3 °F (36.8 °C) (Oral)   Resp 18   Ht 5' 7" (1.702 m)   Wt 57.6 kg (126 lb 15.8 oz)   SpO2 100%   BMI 19.89 kg/m²   Wt Readings from Last 3 Encounters:   07/13/23 57.6 kg (126 lb 15.8 oz)   03/29/23 58.5 kg (128 lb 15.5 oz)   03/13/23 56.9 kg (125 lb 7.1 oz)       Body mass index is 19.89 kg/m².      Physical Exam  Vitals and nursing note reviewed.   Constitutional:       General: She is not in acute distress.     Appearance: Normal appearance. She is not ill-appearing.   HENT:      Head: Normocephalic and atraumatic.      Right Ear: External ear normal.      Left Ear: External ear normal.      Mouth/Throat:      Pharynx: No oropharyngeal exudate.   Eyes:      General: No scleral icterus.        Right eye: No discharge.         Left eye: No discharge.   Cardiovascular:      Rate and Rhythm: Normal rate.   Pulmonary:      Effort: Pulmonary effort is normal. No respiratory distress.   Abdominal:      General: There is no distension.   Musculoskeletal:         General: No swelling or deformity.      Cervical back: Normal range of motion and neck supple.      Right " lower leg: No edema.      Left lower leg: No edema.   Skin:     Coloration: Skin is not jaundiced.      Findings: No bruising, erythema, lesion or rash.   Neurological:      General: No focal deficit present.      Mental Status: She is alert and oriented to person, place, and time. Mental status is at baseline.      Coordination: Coordination normal.      Gait: Gait normal.   Psychiatric:         Mood and Affect: Mood normal.         Behavior: Behavior normal.           Current Outpatient Medications   Medication Sig Dispense Refill    valACYclovir (VALTREX) 1000 MG tablet Take 1 tablet (1,000 mg total) by mouth every 12 (twelve) hours. 30 tablet 1    polyethylene glycol (GOLYTELY) 236-22.74-6.74 -5.86 gram suspension SMARTSIG:Milliliter(s) By Mouth       No current facility-administered medications for this visit.       Pertinent Diagnostic studies:      No visits with results within 1 Week(s) from this visit.   Latest known visit with results is:   Lab Visit on 06/30/2023   Component Date Value Ref Range Status    Iron 06/30/2023 20 (L)  30 - 160 ug/dL Final    Transferrin 06/30/2023 313  200 - 375 mg/dL Final    TIBC 06/30/2023 463 (H)  250 - 450 ug/dL Final    Saturated Iron 06/30/2023 4 (L)  20 - 50 % Final    Ferritin 06/30/2023 5 (L)  20.0 - 300.0 ng/mL Final    WBC 06/30/2023 2.90 (L)  3.90 - 12.70 K/uL Final    RBC 06/30/2023 4.13  4.00 - 5.40 M/uL Final    Hemoglobin 06/30/2023 8.4 (L)  12.0 - 16.0 g/dL Final    Hematocrit 06/30/2023 30.5 (L)  37.0 - 48.5 % Final    MCV 06/30/2023 74 (L)  82 - 98 fL Final    MCH 06/30/2023 20.3 (L)  27.0 - 31.0 pg Final    MCHC 06/30/2023 27.5 (L)  32.0 - 36.0 g/dL Final    RDW 06/30/2023 22.5 (H)  11.5 - 14.5 % Final    Platelets 06/30/2023 276  150 - 450 K/uL Final    MPV 06/30/2023 8.1 (L)  9.2 - 12.9 fL Final    Immature Granulocytes 06/30/2023 0.3  0.0 - 0.5 % Final    Gran # (ANC) 06/30/2023 1.6 (L)  1.8 - 7.7 K/uL Final    Immature Grans (Abs)  06/30/2023 0.01  0.00 - 0.04 K/uL Final    Comment: Mild elevation in immature granulocytes is non specific and   can be seen in a variety of conditions including stress response,   acute inflammation, trauma and pregnancy. Correlation with other   laboratory and clinical findings is essential.      Lymph # 06/30/2023 0.9 (L)  1.0 - 4.8 K/uL Final    Mono # 06/30/2023 0.4  0.3 - 1.0 K/uL Final    Eos # 06/30/2023 0.0  0.0 - 0.5 K/uL Final    Baso # 06/30/2023 0.02  0.00 - 0.20 K/uL Final    nRBC 06/30/2023 0  0 /100 WBC Final    Gran % 06/30/2023 53.5  38.0 - 73.0 % Final    Lymph % 06/30/2023 30.0  18.0 - 48.0 % Final    Mono % 06/30/2023 14.1  4.0 - 15.0 % Final    Eosinophil % 06/30/2023 1.4  0.0 - 8.0 % Final    Basophil % 06/30/2023 0.7  0.0 - 1.9 % Final    Differential Method 06/30/2023 Automated   Final           Oncology History    No history exists.     Assessment :       1. Symptomatic anemia    2. Iron deficiency anemia due to chronic blood loss    3. Other neutropenia        Plan :       Iron def anemia ?HMB/ fibroids, has Cscope 4/2023 at [facility]- polyp removed.   Chronic anemia worsened over years . Unable to tolerate iron pills due to severe constipation  Severe anemia, got some iv venofer with improvement in hb , however still significant iron def anemia, plan for additional iv iron.   Hand out on iron def and iron rich foods given to pt.       Discussed side effects of Injectafer (or venofer) which include but are not limited to infusion reaction, shortness of breath, hives, rash, flushing, itching, headaches, skin discoloration at the injection site, nausea, vomiting, low phosphorus level, elevated blood pressure, elevated liver enzymes, risks to the unborn baby if pregnant. Patient understands the risks and agrees with proceeding with Injectafer (or venofer).     Leukopenia/ neutropenia- pt asymptomatic, b12 wnl   Will check for infectious, sam, other nutritional labs  Monitor        Electronically signed by Concepcion De La Cruz    [facility]      No future appointments.          This note was created with voice recognition software.  Grammatical, syntax and spelling errors may be inevitable.

## 2023-08-01 ENCOUNTER — TELEPHONE (OUTPATIENT)
Dept: INFUSION THERAPY | Facility: OTHER | Age: 48
End: 2023-08-01
Payer: COMMERCIAL

## 2023-08-01 NOTE — TELEPHONE ENCOUNTER
Returned call and spoke with MsRodo Glenn. Iron infusion scheduled. No other questions asked.    ----- Message from Cecelia Milan sent at 8/1/2023 11:58 AM CDT -----  Regarding: pt return call  Patient Returning Call Who Called: ALEXANDRA PASTRANA [[phone]]      Who Left Message for Patient: Hilda Claros RN         Does the patient know what this is regarding? Yes       Best Call Back Number: [phone]      Additional Information pt is returning call and would like a call back, please advise.

## 2023-08-01 NOTE — TELEPHONE ENCOUNTER
Returned call in regards to scheduling her iron infusion. Information provided with call back.     ----- Message from Roxana Grewal MA sent at 7/31/2023  4:32 PM CDT -----  Contact: Patient  Any updates for patient? Patient states that someone was handling PA with her insurance approving the right iron treatment.   ----- Message -----  From: Ani Vidal  Sent: 7/31/2023   4:10 PM CDT  To: Jono Javier Staff    Type:  Patient Call          Who Called: patient         Does the patient know what this is regarding?: Requesting a call back about having another round of infusion ; please advise           Would the patient rather a call back or a response via [facility]? Call           Best Call Back Number:[phone] ([place])              Additional Information:

## 2023-08-03 ENCOUNTER — INFUSION (OUTPATIENT)
Dept: INFUSION THERAPY | Facility: OTHER | Age: 48
End: 2023-08-03
Attending: STUDENT IN AN ORGANIZED HEALTH CARE EDUCATION/TRAINING PROGRAM
Payer: COMMERCIAL

## 2023-08-03 VITALS
TEMPERATURE: 98 F | RESPIRATION RATE: 16 BRPM | HEART RATE: 76 BPM | OXYGEN SATURATION: 100 % | DIASTOLIC BLOOD PRESSURE: 66 MMHG | SYSTOLIC BLOOD PRESSURE: 124 MMHG

## 2023-08-03 DIAGNOSIS — D50.0 IRON DEFICIENCY ANEMIA DUE TO CHRONIC BLOOD LOSS: Primary | ICD-10-CM

## 2023-08-03 PROCEDURE — 63600175 PHARM REV CODE 636 W HCPCS: Mod: JZ,JG | Performed by: STUDENT IN AN ORGANIZED HEALTH CARE EDUCATION/TRAINING PROGRAM

## 2023-08-03 PROCEDURE — 25000003 PHARM REV CODE 250: Performed by: STUDENT IN AN ORGANIZED HEALTH CARE EDUCATION/TRAINING PROGRAM

## 2023-08-03 PROCEDURE — 96365 THER/PROPH/DIAG IV INF INIT: CPT

## 2023-08-03 RX ORDER — DIPHENHYDRAMINE HYDROCHLORIDE 50 MG/ML
50 INJECTION INTRAMUSCULAR; INTRAVENOUS ONCE AS NEEDED
Status: DISCONTINUED | OUTPATIENT
Start: 2023-08-03 | End: 2023-08-03 | Stop reason: HOSPADM

## 2023-08-03 RX ORDER — SODIUM CHLORIDE 0.9 % (FLUSH) 0.9 %
10 SYRINGE (ML) INJECTION
Status: CANCELLED | OUTPATIENT
Start: 2023-08-10

## 2023-08-03 RX ORDER — SODIUM CHLORIDE 9 MG/ML
INJECTION, SOLUTION INTRAVENOUS CONTINUOUS
Status: DISCONTINUED | OUTPATIENT
Start: 2023-08-03 | End: 2023-08-03 | Stop reason: HOSPADM

## 2023-08-03 RX ORDER — DIPHENHYDRAMINE HYDROCHLORIDE 50 MG/ML
50 INJECTION INTRAMUSCULAR; INTRAVENOUS ONCE AS NEEDED
Status: CANCELLED | OUTPATIENT
Start: 2023-08-10

## 2023-08-03 RX ORDER — HEPARIN 100 UNIT/ML
5 SYRINGE INTRAVENOUS
Status: DISCONTINUED | OUTPATIENT
Start: 2023-08-03 | End: 2023-08-03 | Stop reason: HOSPADM

## 2023-08-03 RX ORDER — SODIUM CHLORIDE 9 MG/ML
INJECTION, SOLUTION INTRAVENOUS CONTINUOUS
Status: CANCELLED | OUTPATIENT
Start: 2023-08-10

## 2023-08-03 RX ORDER — EPINEPHRINE 0.3 MG/.3ML
0.3 INJECTION SUBCUTANEOUS ONCE AS NEEDED
Status: DISCONTINUED | OUTPATIENT
Start: 2023-08-03 | End: 2023-08-03 | Stop reason: HOSPADM

## 2023-08-03 RX ORDER — EPINEPHRINE 0.3 MG/.3ML
0.3 INJECTION SUBCUTANEOUS ONCE AS NEEDED
Status: CANCELLED | OUTPATIENT
Start: 2023-08-10

## 2023-08-03 RX ORDER — HEPARIN 100 UNIT/ML
5 SYRINGE INTRAVENOUS
Status: CANCELLED | OUTPATIENT
Start: 2023-08-10

## 2023-08-03 RX ORDER — SODIUM CHLORIDE 0.9 % (FLUSH) 0.9 %
10 SYRINGE (ML) INJECTION
Status: DISCONTINUED | OUTPATIENT
Start: 2023-08-03 | End: 2023-08-03 | Stop reason: HOSPADM

## 2023-08-03 RX ADMIN — SODIUM CHLORIDE: 9 INJECTION, SOLUTION INTRAVENOUS at 09:08

## 2023-08-03 RX ADMIN — FERRIC CARBOXYMALTOSE INJECTION 750 MG: 50 INJECTION, SOLUTION INTRAVENOUS at 09:08

## 2023-08-03 NOTE — PLAN OF CARE
Patient tolerated her Injectafer infusion. No reaction noted. VSS. NAD. Appointments for next infusion has been setup. No other questions at this time. Patient discharged to home per self.

## 2023-08-10 ENCOUNTER — INFUSION (OUTPATIENT)
Dept: INFUSION THERAPY | Facility: OTHER | Age: 48
End: 2023-08-10
Attending: STUDENT IN AN ORGANIZED HEALTH CARE EDUCATION/TRAINING PROGRAM
Payer: COMMERCIAL

## 2023-08-10 VITALS
SYSTOLIC BLOOD PRESSURE: 128 MMHG | RESPIRATION RATE: 18 BRPM | DIASTOLIC BLOOD PRESSURE: 70 MMHG | OXYGEN SATURATION: 100 % | TEMPERATURE: 99 F | HEART RATE: 62 BPM

## 2023-08-10 DIAGNOSIS — D50.0 IRON DEFICIENCY ANEMIA DUE TO CHRONIC BLOOD LOSS: Primary | ICD-10-CM

## 2023-08-10 PROCEDURE — 96365 THER/PROPH/DIAG IV INF INIT: CPT

## 2023-08-10 PROCEDURE — 63600175 PHARM REV CODE 636 W HCPCS: Mod: JZ,JG | Performed by: STUDENT IN AN ORGANIZED HEALTH CARE EDUCATION/TRAINING PROGRAM

## 2023-08-10 PROCEDURE — 25000003 PHARM REV CODE 250: Performed by: STUDENT IN AN ORGANIZED HEALTH CARE EDUCATION/TRAINING PROGRAM

## 2023-08-10 RX ORDER — SODIUM CHLORIDE 9 MG/ML
INJECTION, SOLUTION INTRAVENOUS CONTINUOUS
Status: DISCONTINUED | OUTPATIENT
Start: 2023-08-10 | End: 2023-08-10 | Stop reason: HOSPADM

## 2023-08-10 RX ORDER — HEPARIN 100 UNIT/ML
5 SYRINGE INTRAVENOUS
Status: CANCELLED | OUTPATIENT
Start: 2023-08-10

## 2023-08-10 RX ORDER — HEPARIN 100 UNIT/ML
5 SYRINGE INTRAVENOUS
Status: DISCONTINUED | OUTPATIENT
Start: 2023-08-10 | End: 2023-08-10 | Stop reason: HOSPADM

## 2023-08-10 RX ORDER — EPINEPHRINE 0.3 MG/.3ML
0.3 INJECTION SUBCUTANEOUS ONCE AS NEEDED
OUTPATIENT
Start: 2023-08-10

## 2023-08-10 RX ORDER — SODIUM CHLORIDE 0.9 % (FLUSH) 0.9 %
10 SYRINGE (ML) INJECTION
Status: DISCONTINUED | OUTPATIENT
Start: 2023-08-10 | End: 2023-08-10 | Stop reason: HOSPADM

## 2023-08-10 RX ORDER — SODIUM CHLORIDE 9 MG/ML
INJECTION, SOLUTION INTRAVENOUS CONTINUOUS
Status: CANCELLED | OUTPATIENT
Start: 2023-08-10

## 2023-08-10 RX ORDER — DIPHENHYDRAMINE HYDROCHLORIDE 50 MG/ML
50 INJECTION INTRAMUSCULAR; INTRAVENOUS ONCE AS NEEDED
OUTPATIENT
Start: 2023-08-10

## 2023-08-10 RX ORDER — SODIUM CHLORIDE 0.9 % (FLUSH) 0.9 %
10 SYRINGE (ML) INJECTION
Status: CANCELLED | OUTPATIENT
Start: 2023-08-10

## 2023-08-10 RX ADMIN — SODIUM CHLORIDE: 9 INJECTION, SOLUTION INTRAVENOUS at 01:08

## 2023-08-10 RX ADMIN — FERRIC CARBOXYMALTOSE INJECTION 750 MG: 50 INJECTION, SOLUTION INTRAVENOUS at 01:08

## 2023-08-10 NOTE — PLAN OF CARE
Patient tolerated her iron infusion of INjectafer. Reports no discomfort. VSS. NAD. Discussed discharge instruction. Verbalized understanding. Patient discharged to home per self.

## 2023-09-11 ENCOUNTER — PATIENT MESSAGE (OUTPATIENT)
Dept: OBSTETRICS AND GYNECOLOGY | Facility: CLINIC | Age: 48
End: 2023-09-11
Payer: COMMERCIAL

## 2023-10-03 ENCOUNTER — LAB VISIT (OUTPATIENT)
Dept: LAB | Facility: OTHER | Age: 48
End: 2023-10-03
Attending: STUDENT IN AN ORGANIZED HEALTH CARE EDUCATION/TRAINING PROGRAM
Payer: COMMERCIAL

## 2023-10-03 DIAGNOSIS — D70.8 OTHER NEUTROPENIA: ICD-10-CM

## 2023-10-03 LAB
BASOPHILS # BLD AUTO: 0.02 K/UL (ref 0–0.2)
BASOPHILS NFR BLD: 0.7 % (ref 0–1.9)
DIFFERENTIAL METHOD: ABNORMAL
EOSINOPHIL # BLD AUTO: 0 K/UL (ref 0–0.5)
EOSINOPHIL NFR BLD: 0.7 % (ref 0–8)
ERYTHROCYTE [DISTWIDTH] IN BLOOD BY AUTOMATED COUNT: 22.1 % (ref 11.5–14.5)
FOLATE SERPL-MCNC: 15.9 NG/ML (ref 4–24)
HBV CORE AB SERPL QL IA: NORMAL
HBV SURFACE AB SER-ACNC: <3 MIU/ML
HBV SURFACE AB SER-ACNC: NORMAL M[IU]/ML
HBV SURFACE AG SERPL QL IA: NORMAL
HCT VFR BLD AUTO: 41 % (ref 37–48.5)
HCV AB SERPL QL IA: NORMAL
HGB BLD-MCNC: 13 G/DL (ref 12–16)
HIV 1+2 AB+HIV1 P24 AG SERPL QL IA: NORMAL
IMM GRANULOCYTES # BLD AUTO: 0.03 K/UL (ref 0–0.04)
IMM GRANULOCYTES NFR BLD AUTO: 1 % (ref 0–0.5)
LYMPHOCYTES # BLD AUTO: 1.3 K/UL (ref 1–4.8)
LYMPHOCYTES NFR BLD: 42.4 % (ref 18–48)
MCH RBC QN AUTO: 27.1 PG (ref 27–31)
MCHC RBC AUTO-ENTMCNC: 31.7 G/DL (ref 32–36)
MCV RBC AUTO: 85 FL (ref 82–98)
MONOCYTES # BLD AUTO: 0.4 K/UL (ref 0.3–1)
MONOCYTES NFR BLD: 12.1 % (ref 4–15)
NEUTROPHILS # BLD AUTO: 1.3 K/UL (ref 1.8–7.7)
NEUTROPHILS NFR BLD: 43.1 % (ref 38–73)
NRBC BLD-RTO: 0 /100 WBC
PATH REV BLD -IMP: NORMAL
PATH REV BLD -IMP: NORMAL
PLATELET # BLD AUTO: 348 K/UL (ref 150–450)
PMV BLD AUTO: 8.4 FL (ref 9.2–12.9)
RBC # BLD AUTO: 4.8 M/UL (ref 4–5.4)
WBC # BLD AUTO: 2.97 K/UL (ref 3.9–12.7)

## 2023-10-03 PROCEDURE — 85060 BLOOD SMEAR INTERPRETATION: CPT | Mod: ,,, | Performed by: PATHOLOGY

## 2023-10-03 PROCEDURE — 82525 ASSAY OF COPPER: CPT | Performed by: STUDENT IN AN ORGANIZED HEALTH CARE EDUCATION/TRAINING PROGRAM

## 2023-10-03 PROCEDURE — 86706 HEP B SURFACE ANTIBODY: CPT | Performed by: STUDENT IN AN ORGANIZED HEALTH CARE EDUCATION/TRAINING PROGRAM

## 2023-10-03 PROCEDURE — 86704 HEP B CORE ANTIBODY TOTAL: CPT | Performed by: STUDENT IN AN ORGANIZED HEALTH CARE EDUCATION/TRAINING PROGRAM

## 2023-10-03 PROCEDURE — 36415 COLL VENOUS BLD VENIPUNCTURE: CPT | Performed by: STUDENT IN AN ORGANIZED HEALTH CARE EDUCATION/TRAINING PROGRAM

## 2023-10-03 PROCEDURE — 85025 COMPLETE CBC W/AUTO DIFF WBC: CPT | Performed by: STUDENT IN AN ORGANIZED HEALTH CARE EDUCATION/TRAINING PROGRAM

## 2023-10-03 PROCEDURE — 85060 PATHOLOGIST REVIEW: ICD-10-PCS | Mod: ,,, | Performed by: PATHOLOGY

## 2023-10-03 PROCEDURE — 82746 ASSAY OF FOLIC ACID SERUM: CPT | Performed by: STUDENT IN AN ORGANIZED HEALTH CARE EDUCATION/TRAINING PROGRAM

## 2023-10-03 PROCEDURE — 87340 HEPATITIS B SURFACE AG IA: CPT | Performed by: STUDENT IN AN ORGANIZED HEALTH CARE EDUCATION/TRAINING PROGRAM

## 2023-10-03 PROCEDURE — 86803 HEPATITIS C AB TEST: CPT | Performed by: STUDENT IN AN ORGANIZED HEALTH CARE EDUCATION/TRAINING PROGRAM

## 2023-10-03 PROCEDURE — 87389 HIV-1 AG W/HIV-1&-2 AB AG IA: CPT | Performed by: STUDENT IN AN ORGANIZED HEALTH CARE EDUCATION/TRAINING PROGRAM

## 2023-10-03 PROCEDURE — 86038 ANTINUCLEAR ANTIBODIES: CPT | Performed by: STUDENT IN AN ORGANIZED HEALTH CARE EDUCATION/TRAINING PROGRAM

## 2023-10-04 LAB — ANA SER QL IF: NORMAL

## 2023-10-05 ENCOUNTER — OFFICE VISIT (OUTPATIENT)
Dept: HEMATOLOGY/ONCOLOGY | Facility: CLINIC | Age: 48
End: 2023-10-05
Payer: COMMERCIAL

## 2023-10-05 VITALS
HEART RATE: 76 BPM | DIASTOLIC BLOOD PRESSURE: 82 MMHG | BODY MASS INDEX: 19.17 KG/M2 | SYSTOLIC BLOOD PRESSURE: 115 MMHG | HEIGHT: 67 IN | OXYGEN SATURATION: 100 % | RESPIRATION RATE: 18 BRPM | TEMPERATURE: 99 F | WEIGHT: 122.13 LBS

## 2023-10-05 DIAGNOSIS — D70.8 OTHER NEUTROPENIA: ICD-10-CM

## 2023-10-05 DIAGNOSIS — D50.0 IRON DEFICIENCY ANEMIA DUE TO CHRONIC BLOOD LOSS: Primary | ICD-10-CM

## 2023-10-05 PROCEDURE — 1160F RVW MEDS BY RX/DR IN RCRD: CPT | Mod: CPTII,S$GLB,, | Performed by: STUDENT IN AN ORGANIZED HEALTH CARE EDUCATION/TRAINING PROGRAM

## 2023-10-05 PROCEDURE — 3079F PR MOST RECENT DIASTOLIC BLOOD PRESSURE 80-89 MM HG: ICD-10-PCS | Mod: CPTII,S$GLB,, | Performed by: STUDENT IN AN ORGANIZED HEALTH CARE EDUCATION/TRAINING PROGRAM

## 2023-10-05 PROCEDURE — 1160F PR REVIEW ALL MEDS BY PRESCRIBER/CLIN PHARMACIST DOCUMENTED: ICD-10-PCS | Mod: CPTII,S$GLB,, | Performed by: STUDENT IN AN ORGANIZED HEALTH CARE EDUCATION/TRAINING PROGRAM

## 2023-10-05 PROCEDURE — 3074F SYST BP LT 130 MM HG: CPT | Mod: CPTII,S$GLB,, | Performed by: STUDENT IN AN ORGANIZED HEALTH CARE EDUCATION/TRAINING PROGRAM

## 2023-10-05 PROCEDURE — 99214 PR OFFICE/OUTPT VISIT, EST, LEVL IV, 30-39 MIN: ICD-10-PCS | Mod: S$GLB,,, | Performed by: STUDENT IN AN ORGANIZED HEALTH CARE EDUCATION/TRAINING PROGRAM

## 2023-10-05 PROCEDURE — 99999 PR PBB SHADOW E&M-EST. PATIENT-LVL III: CPT | Mod: PBBFAC,,, | Performed by: STUDENT IN AN ORGANIZED HEALTH CARE EDUCATION/TRAINING PROGRAM

## 2023-10-05 PROCEDURE — 1159F PR MEDICATION LIST DOCUMENTED IN MEDICAL RECORD: ICD-10-PCS | Mod: CPTII,S$GLB,, | Performed by: STUDENT IN AN ORGANIZED HEALTH CARE EDUCATION/TRAINING PROGRAM

## 2023-10-05 PROCEDURE — 99999 PR PBB SHADOW E&M-EST. PATIENT-LVL III: ICD-10-PCS | Mod: PBBFAC,,, | Performed by: STUDENT IN AN ORGANIZED HEALTH CARE EDUCATION/TRAINING PROGRAM

## 2023-10-05 PROCEDURE — 99214 OFFICE O/P EST MOD 30 MIN: CPT | Mod: S$GLB,,, | Performed by: STUDENT IN AN ORGANIZED HEALTH CARE EDUCATION/TRAINING PROGRAM

## 2023-10-05 PROCEDURE — 3008F PR BODY MASS INDEX (BMI) DOCUMENTED: ICD-10-PCS | Mod: CPTII,S$GLB,, | Performed by: STUDENT IN AN ORGANIZED HEALTH CARE EDUCATION/TRAINING PROGRAM

## 2023-10-05 PROCEDURE — 3044F PR MOST RECENT HEMOGLOBIN A1C LEVEL <7.0%: ICD-10-PCS | Mod: CPTII,S$GLB,, | Performed by: STUDENT IN AN ORGANIZED HEALTH CARE EDUCATION/TRAINING PROGRAM

## 2023-10-05 PROCEDURE — 3079F DIAST BP 80-89 MM HG: CPT | Mod: CPTII,S$GLB,, | Performed by: STUDENT IN AN ORGANIZED HEALTH CARE EDUCATION/TRAINING PROGRAM

## 2023-10-05 PROCEDURE — 3074F PR MOST RECENT SYSTOLIC BLOOD PRESSURE < 130 MM HG: ICD-10-PCS | Mod: CPTII,S$GLB,, | Performed by: STUDENT IN AN ORGANIZED HEALTH CARE EDUCATION/TRAINING PROGRAM

## 2023-10-05 PROCEDURE — 3044F HG A1C LEVEL LT 7.0%: CPT | Mod: CPTII,S$GLB,, | Performed by: STUDENT IN AN ORGANIZED HEALTH CARE EDUCATION/TRAINING PROGRAM

## 2023-10-05 PROCEDURE — 1159F MED LIST DOCD IN RCRD: CPT | Mod: CPTII,S$GLB,, | Performed by: STUDENT IN AN ORGANIZED HEALTH CARE EDUCATION/TRAINING PROGRAM

## 2023-10-05 PROCEDURE — 3008F BODY MASS INDEX DOCD: CPT | Mod: CPTII,S$GLB,, | Performed by: STUDENT IN AN ORGANIZED HEALTH CARE EDUCATION/TRAINING PROGRAM

## 2023-10-05 NOTE — PROGRESS NOTES
Hematology- Oncology Clinic Note :      10/5/2023    RFV / chief complaint- Anemia        HPI  Pt is a 48 y.o. female who  has a past medical history of Breast disorder and Herpes simplex virus (HSV) infection.   Pt presents to the clinic today for anemia      C/o fatigue,better  Bleeding- HMB ,   Oral iron - cause constipation  Appetite - good   Weight loss- none    ON[facility] ALL PLAN MEDICATIONS 5/2/2023 5/2/2023 5/9/2023 5/9/2023   iron sucrose (VENOFER)  mg  100 mg      ON[facility] ALL PLAN MEDICATIONS 5/16/2023 5/16/2023 5/23/2023 5/23/2023   iron sucrose (VENOFER)  mg  100 mg      ON[facility] ALL PLAN MEDICATIONS 6/6/2023   iron sucrose (VENOFER)  mg     No fhx of colon cancer, father with prostate cancer   No smoking. Social drinking. No illicit drugs   [place]     Reviewed past medical/surgical/social history    Past Medical History:   Diagnosis Date    Breast disorder     Fibroids.    Herpes simplex virus (HSV) infection     HSV2.      History reviewed. No pertinent surgical history.   Review of patient's allergies indicates:  No Known Allergies   Social History     Tobacco Use    Smoking status: Never    Smokeless tobacco: Never   Substance Use Topics    Alcohol use: Yes     Comment: Socially.      Family History   Problem Relation Age of Onset    Hypertension Father     Prostate cancer Father 68    Breast cancer Neg Hx     Colon cancer Neg Hx     Ovarian cancer Neg Hx           Review of Systems :  Review of Systems   Constitutional:  Positive for malaise/fatigue. Negative for chills, diaphoresis, fever and weight loss.   HENT: Negative.  Negative for congestion, hearing loss, nosebleeds, sore throat and tinnitus.    Eyes: Negative.  Negative for blurred vision and discharge.   Respiratory:  Negative for cough, hemoptysis, sputum production, shortness of breath and wheezing.    Cardiovascular:  Negative for chest pain, palpitations and leg swelling.   Gastrointestinal: Negative.  Negative for  "abdominal pain, blood in stool, constipation, diarrhea, heartburn, melena, nausea and vomiting.   Genitourinary: Negative.    Musculoskeletal: Negative.  Negative for back pain, falls, joint pain and myalgias.   Skin: Negative.  Negative for itching and rash.   Neurological:  Negative for dizziness, tingling, sensory change, speech change, focal weakness, seizures, loss of consciousness, weakness and headaches.   Endo/Heme/Allergies: Negative.  Does not bruise/bleed easily.   Psychiatric/Behavioral: Negative.  Negative for depression. The patient is not nervous/anxious and does not have insomnia.                Physical Exam :  /82 (BP Location: Left arm, Patient Position: Sitting, BP Method: Small (Automatic))   Pulse 76   Temp 99.3 °F (37.4 °C) (Oral)   Resp 18   Ht 5' 7" (1.702 m)   Wt 55.4 kg (122 lb 2.2 oz)   SpO2 100%   BMI 19.13 kg/m²   Wt Readings from Last 3 Encounters:   10/05/23 55.4 kg (122 lb 2.2 oz)   07/13/23 57.6 kg (126 lb 15.8 oz)   03/29/23 58.5 kg (128 lb 15.5 oz)       Body mass index is 19.13 kg/m².      Physical Exam  Vitals and nursing note reviewed.   Constitutional:       General: She is not in acute distress.     Appearance: Normal appearance. She is not ill-appearing.   HENT:      Head: Normocephalic and atraumatic.      Right Ear: External ear normal.      Left Ear: External ear normal.      Mouth/Throat:      Pharynx: No oropharyngeal exudate.   Eyes:      General: No scleral icterus.        Right eye: No discharge.         Left eye: No discharge.   Cardiovascular:      Rate and Rhythm: Normal rate.   Pulmonary:      Effort: Pulmonary effort is normal. No respiratory distress.   Abdominal:      General: There is no distension.   Musculoskeletal:         General: No swelling or deformity.      Cervical back: Normal range of motion and neck supple.      Right lower leg: No edema.      Left lower leg: No edema.   Skin:     Coloration: Skin is not jaundiced.      Findings: No " bruising, erythema, lesion or rash.   Neurological:      General: No focal deficit present.      Mental Status: She is alert and oriented to person, place, and time. Mental status is at baseline.      Coordination: Coordination normal.      Gait: Gait normal.   Psychiatric:         Mood and Affect: Mood normal.         Behavior: Behavior normal.             Current Outpatient Medications   Medication Sig Dispense Refill    valACYclovir (VALTREX) 1000 MG tablet Take 1 tablet (1,000 mg total) by mouth every 12 (twelve) hours. 30 tablet 1    polyethylene glycol (GOLYTELY) 236-22.74-6.74 -5.86 gram suspension SMARTSIG:Milliliter(s) By Mouth       No current facility-administered medications for this visit.       Pertinent Diagnostic studies:      Lab Visit on 10/03/2023   Component Date Value Ref Range Status    HIV 1/2 Ag/Ab 10/03/2023 Non-reactive  Non-reactive Final    Hepatitis C Ab 10/03/2023 Non-reactive  Non-reactive Final    Hep B Core Total Ab 10/03/2023 Non-reactive  Non-reactive Final    Hep B S Ab 10/03/2023 <3.00  mIU/mL Final    Hep B S Ab 10/03/2023 Non-reactive   Final    Individual is considered not immune to HBV infection.    Hepatitis B Surface Ag 10/03/2023 Non-reactive  Non-reactive Final    Pathologist Review 10/03/2023 Review required   Final    Folate 10/03/2023 15.9  4.0 - 24.0 ng/mL Final    MARISOL Screen 10/03/2023 Negative <1:80  Negative <1:80 Final    MARISOL test was performed by Immunofluorescence on HEP2 cells.       WBC 10/03/2023 2.97 (L)  3.90 - 12.70 K/uL Final    RBC 10/03/2023 4.80  4.00 - 5.40 M/uL Final    Hemoglobin 10/03/2023 13.0  12.0 - 16.0 g/dL Final    Hematocrit 10/03/2023 41.0  37.0 - 48.5 % Final    MCV 10/03/2023 85  82 - 98 fL Final    MCH 10/03/2023 27.1  27.0 - 31.0 pg Final    MCHC 10/03/2023 31.7 (L)  32.0 - 36.0 g/dL Final    RDW 10/03/2023 22.1 (H)  11.5 - 14.5 % Final    Platelets 10/03/2023 348  150 - 450 K/uL Final    MPV 10/03/2023 8.4 (L)  9.2 - 12.9 fL Final     Immature Granulocytes 10/03/2023 1.0 (H)  0.0 - 0.5 % Final    Gran # (ANC) 10/03/2023 1.3 (L)  1.8 - 7.7 K/uL Final    Immature Grans (Abs) 10/03/2023 0.03  0.00 - 0.04 K/uL Final    Comment: Mild elevation in immature granulocytes is non specific and   can be seen in a variety of conditions including stress response,   acute inflammation, trauma and pregnancy. Correlation with other   laboratory and clinical findings is essential.      Lymph # 10/03/2023 1.3  1.0 - 4.8 K/uL Final    Mono # 10/03/2023 0.4  0.3 - 1.0 K/uL Final    Eos # 10/03/2023 0.0  0.0 - 0.5 K/uL Final    Baso # 10/03/2023 0.02  0.00 - 0.20 K/uL Final    nRBC 10/03/2023 0  0 /100 WBC Final    Gran % 10/03/2023 43.1  38.0 - 73.0 % Final    Lymph % 10/03/2023 42.4  18.0 - 48.0 % Final    Mono % 10/03/2023 12.1  4.0 - 15.0 % Final    Eosinophil % 10/03/2023 0.7  0.0 - 8.0 % Final    Basophil % 10/03/2023 0.7  0.0 - 1.9 % Final    Differential Method 10/03/2023 Automated   Final    Pathologist Review Peripheral Smear 10/03/2023 REVIEWED   Final    Comment:   Electronically reviewed and signed by:  STEPHANIE Lane MD  Signed on 10/03/23 at 14:36  The red blood cells show mild anisopoikilocytosis. There are no   nucleated or fragmented red cell forms identified.  The white blood   cell  count is decreased with an absolute neutropenia.  The platelet   count and morphology are within normal limits with no satellitosis or   platelet clumps identified.     Interpreted by Bambi Lane M.D.             Oncology History    No history exists.     Assessment :       1. Iron deficiency anemia due to chronic blood loss    2. Other neutropenia        Plan :       Iron def anemia ?HMB/ fibroids, has Cscope 4/2023 at OSH- polyp removed.   Chronic anemia worsened over years . Unable to tolerate iron pills due to severe constipation  S/p IV iron injectafer in Aug 2023 with improvement in hb labs   Hand out on iron def and iron rich foods given to pt.   RTC  4 to 6 months with labs     Discussed side effects of Injectafer (or venofer) which include but are not limited to infusion reaction, shortness of breath, hives, rash, flushing, itching, headaches, skin discoloration at the injection site, nausea, vomiting, low phosphorus level, elevated blood pressure, elevated liver enzymes, risks to the unborn baby if pregnant. Patient understands the risks and agrees with proceeding with Injectafer (or venofer).     Leukopenia/ neutropenia- likely benign ethnic/ constitutional  pt asymptomatic,   Folate, hepatitis, hiv, sam, b12 wnl / neg  Copper level pending- will follow  Monitor   No intervention reqd      Electronically signed by Ankita Gupta [facility]      No future appointments.          This note was created with voice recognition software.  Grammatical, syntax and spelling errors may be inevitable.

## 2023-10-06 LAB — COPPER SERPL-MCNC: 1692 UG/L (ref 810–1990)

## 2024-01-21 ENCOUNTER — NURSE TRIAGE (OUTPATIENT)
Dept: ADMINISTRATIVE | Facility: CLINIC | Age: 49
End: 2024-01-21
Payer: COMMERCIAL

## 2024-01-22 ENCOUNTER — TELEPHONE (OUTPATIENT)
Dept: OBSTETRICS AND GYNECOLOGY | Facility: CLINIC | Age: 49
End: 2024-01-22
Payer: COMMERCIAL

## 2024-01-22 ENCOUNTER — PATIENT MESSAGE (OUTPATIENT)
Dept: OBSTETRICS AND GYNECOLOGY | Facility: CLINIC | Age: 49
End: 2024-01-22
Payer: COMMERCIAL

## 2024-01-22 ENCOUNTER — OFFICE VISIT (OUTPATIENT)
Dept: OBSTETRICS AND GYNECOLOGY | Facility: CLINIC | Age: 49
End: 2024-01-22
Payer: COMMERCIAL

## 2024-01-22 VITALS
DIASTOLIC BLOOD PRESSURE: 80 MMHG | BODY MASS INDEX: 20.28 KG/M2 | SYSTOLIC BLOOD PRESSURE: 116 MMHG | HEIGHT: 67 IN | WEIGHT: 129.19 LBS

## 2024-01-22 DIAGNOSIS — N75.1 BARTHOLIN'S GLAND ABSCESS: ICD-10-CM

## 2024-01-22 DIAGNOSIS — B37.31 YEAST VAGINITIS: Primary | ICD-10-CM

## 2024-01-22 DIAGNOSIS — N75.0 BARTHOLIN CYST: Primary | ICD-10-CM

## 2024-01-22 PROCEDURE — 99999 PR PBB SHADOW E&M-EST. PATIENT-LVL III: CPT | Mod: PBBFAC,,, | Performed by: OBSTETRICS & GYNECOLOGY

## 2024-01-22 PROCEDURE — 87075 CULTR BACTERIA EXCEPT BLOOD: CPT | Performed by: OBSTETRICS & GYNECOLOGY

## 2024-01-22 PROCEDURE — 3079F DIAST BP 80-89 MM HG: CPT | Mod: CPTII,S$GLB,, | Performed by: OBSTETRICS & GYNECOLOGY

## 2024-01-22 PROCEDURE — 87147 CULTURE TYPE IMMUNOLOGIC: CPT | Performed by: OBSTETRICS & GYNECOLOGY

## 2024-01-22 PROCEDURE — 87070 CULTURE OTHR SPECIMN AEROBIC: CPT | Performed by: OBSTETRICS & GYNECOLOGY

## 2024-01-22 PROCEDURE — 99214 OFFICE O/P EST MOD 30 MIN: CPT | Mod: 25,S$GLB,, | Performed by: OBSTETRICS & GYNECOLOGY

## 2024-01-22 PROCEDURE — 56420 I&D BARTHOLINS GLAND ABSCESS: CPT | Mod: S$GLB,,, | Performed by: OBSTETRICS & GYNECOLOGY

## 2024-01-22 PROCEDURE — 3074F SYST BP LT 130 MM HG: CPT | Mod: CPTII,S$GLB,, | Performed by: OBSTETRICS & GYNECOLOGY

## 2024-01-22 PROCEDURE — 87076 CULTURE ANAEROBE IDENT EACH: CPT | Performed by: OBSTETRICS & GYNECOLOGY

## 2024-01-22 PROCEDURE — 3008F BODY MASS INDEX DOCD: CPT | Mod: CPTII,S$GLB,, | Performed by: OBSTETRICS & GYNECOLOGY

## 2024-01-22 RX ORDER — SULFAMETHOXAZOLE AND TRIMETHOPRIM 800; 160 MG/1; MG/1
1 TABLET ORAL 2 TIMES DAILY
Qty: 14 TABLET | Refills: 0 | Status: SHIPPED | OUTPATIENT
Start: 2024-01-22 | End: 2024-01-29

## 2024-01-22 NOTE — TELEPHONE ENCOUNTER
----- Message from Ani Vidal sent at 1/22/2024  9:01 AM CST -----  Contact: Wil  Type:  Patient Call          Who Called: Patient         Does the patient know what this is regarding?: Requesting a call back for a same day appt ; pt is having pain and swelling  that she's concern with ; please advise           Would the patient rather a call back or a response via [facility]?call           Best Call Back Number: [phone]             Additional Information:

## 2024-01-22 NOTE — PROCEDURES
"INCISION AND DRAINAGE    Date/Time: 1/22/2024 1:15 PM    Performed by: Madeline Shin MD  Authorized by: Madeline Shin MD    Time out: Immediately prior to procedure a "time out" was called to verify the correct patient, procedure, equipment, support staff and site/side marked as required.    Consent Done?:  Yes (Written)    Type:  Cyst  Body area:  Anogenital  Location details:  Bartholin's gland  Local anesthetic: Lidocaine 1% without epinephrine  Scalpel size:  11  Incision type:  Single straight  Incision depth: submucosal    Complexity:  Simple  Drainage:  Viscous  Drainage amount:  Moderate  Wound treatment:  Incision, drainage and expression of material  Packing material:  None  Patient tolerance:  Patient tolerated the procedure well with no immediate complications    "

## 2024-01-22 NOTE — TELEPHONE ENCOUNTER
Returned pt call in regard to her antibiotic prescription. I advised pt that her prescription was sent to her preferred pharmacy. Pt verbalized understanding.

## 2024-01-22 NOTE — TELEPHONE ENCOUNTER
"Pt reports vaginal swelling beginning last night. Also having pain to right labia. No abnormal discharge or order. Pt recently tried boric acid vaginal suppository- one Friday and again Saturday morning, but pt states this was not the first time she has done the same suppository. Rates pain at this time 6/10.    Dispo- see PCP within 3 days. Messaged routed to pt's obgyn office requesting they reach out to pt to get her scheduled within dispo time frame. Pt advised accordingly and states she will call scheduling number as well in the morning. Care advice given. Pt verbalizes understanding.  Reason for Disposition   Tender lump (swelling or "ball") at vaginal opening    Additional Information   Negative: Patient sounds very sick or weak to the triager   Negative: [1] SEVERE pain AND [2] not improved 2 hours after pain medicine   Negative: [1] Genital area looks infected (e.g., draining sore, spreading redness) AND [2] fever   Negative: MODERATE-SEVERE itching (i.e., interferes with school, work, or sleep)   Negative: Genital area looks infected (e.g., draining sore, spreading redness)   Negative: Rash with painful tiny water blisters   Negative: [1] Rash (e.g., redness, tiny bumps, sore) of genital area AND [2] present > 24 hours    Protocols used: Vulvar Symptoms-A-AH    "

## 2024-01-22 NOTE — TELEPHONE ENCOUNTER
Spoke to patient, soonest appointment scheduled    ----- Message from Ani Vidal sent at 1/22/2024  8:58 AM CST -----  Contact: patient  Type:  Patient Call          Who Called: patient         Does the patient know what this is regarding?: Requesting a call back for a same day appt ; pt is having pain and swelling that she's concern about ; please advise           Would the patient rather a call back or a response via [facility]?call           Best Call Back Number:[phone]             Additional Information:

## 2024-01-22 NOTE — PROGRESS NOTES
"  Chief Complaint   Patient presents with    Bartholin's Cyst       HPI:   48 y.o.  here today for c/o vulvar swelling that began last week. Initially described a "throbbing" sensation this past Friday, has a history of HSV and initially thought it might be an outbreak, however on self examination didn't see any lesions. Started taking Valtrex with no improvement. Since Friday the area has become increasingly tender to the touch, "elongated hard area". Used boric acid suppository on Friday night and Saturday night with no improvement. Took Tylenol and applied ice to area last night, swelling decreased somewhat but still present today. Denies itching, discharge, or abnormal vaginal odor. No new sexual partners.    LMP Dates from Last 1 Encounters:   LMP: 01/10/2024       Labs / Significant Studies  Pap (2021): NILM/HPV neg  MMG: Needs, breast MRI BIRADS-2 from 2023  Colonoscopy (2023): Negative    No visits with results within 3 Month(s) from this visit.   Latest known visit with results is:   Lab Visit on 10/03/2023   Component Date Value Ref Range Status    HIV 1/2 Ag/Ab 10/03/2023 Non-reactive  Non-reactive Final    Hepatitis C Ab 10/03/2023 Non-reactive  Non-reactive Final    Hep B Core Total Ab 10/03/2023 Non-reactive  Non-reactive Final    Hep B S Ab 10/03/2023 <3.00  mIU/mL Final    Hep B S Ab 10/03/2023 Non-reactive   Final    Individual is considered not immune to HBV infection.    Hepatitis B Surface Ag 10/03/2023 Non-reactive  Non-reactive Final    Pathologist Review 10/03/2023 Review required   Final    Folate 10/03/2023 15.9  4.0 - 24.0 ng/mL Final    Copper 10/03/2023 1692  810 - 1990 ug/L Final    Comment: Copper values may be elevated to twice the normal   levels in pregnancy.      Elevated results may be due to sample collected in a   non-certified trace element-free tube.     This test was developed and the performance   characteristics determined by [facility].   It has " not been cleared or approved by the FDA.   The laboratory is regulated under CLIA as qualified to   perform high-complexity testing. This test is used for   patient testing purposes. It should not be regarded   as investigational or for research.    Test performed at [facility],  [address], [place]  [address]     [phone]  Daria Rai MD, PhD - Medical Director      MARISOL Screen 10/03/2023 Negative <1:80  Negative <1:80 Final    MARISOL test was performed by Immunofluorescence on HEP2 cells.       WBC 10/03/2023 2.97 (L)  3.90 - 12.70 K/uL Final    RBC 10/03/2023 4.80  4.00 - 5.40 M/uL Final    Hemoglobin 10/03/2023 13.0  12.0 - 16.0 g/dL Final    Hematocrit 10/03/2023 41.0  37.0 - 48.5 % Final    MCV 10/03/2023 85  82 - 98 fL Final    MCH 10/03/2023 27.1  27.0 - 31.0 pg Final    MCHC 10/03/2023 31.7 (L)  32.0 - 36.0 g/dL Final    RDW 10/03/2023 22.1 (H)  11.5 - 14.5 % Final    Platelets 10/03/2023 348  150 - 450 K/uL Final    MPV 10/03/2023 8.4 (L)  9.2 - 12.9 fL Final    Immature Granulocytes 10/03/2023 1.0 (H)  0.0 - 0.5 % Final    Gran # (ANC) 10/03/2023 1.3 (L)  1.8 - 7.7 K/uL Final    Immature Grans (Abs) 10/03/2023 0.03  0.00 - 0.04 K/uL Final    Comment: Mild elevation in immature granulocytes is non specific and   can be seen in a variety of conditions including stress response,   acute inflammation, trauma and pregnancy. Correlation with other   laboratory and clinical findings is essential.      Lymph # 10/03/2023 1.3  1.0 - 4.8 K/uL Final    Mono # 10/03/2023 0.4  0.3 - 1.0 K/uL Final    Eos # 10/03/2023 0.0  0.0 - 0.5 K/uL Final    Baso # 10/03/2023 0.02  0.00 - 0.20 K/uL Final    nRBC 10/03/2023 0  0 /100 WBC Final    Gran % 10/03/2023 43.1  38.0 - 73.0 % Final    Lymph % 10/03/2023 42.4  18.0 - 48.0 % Final    Mono % 10/03/2023 12.1  4.0 - 15.0 % Final    Eosinophil % 10/03/2023 0.7  0.0 - 8.0 % Final    Basophil % 10/03/2023 0.7  0.0 - 1.9 % Final    Differential Method  10/03/2023 Automated   Final    Pathologist Review Peripheral Smear 10/03/2023 REVIEWED   Final    Comment:   Electronically reviewed and signed by:  STEPHANIE Lane MD  Signed on 10/03/23 at 14:36  The red blood cells show mild anisopoikilocytosis. There are no   nucleated or fragmented red cell forms identified.  The white blood   cell  count is decreased with an absolute neutropenia.  The platelet   count and morphology are within normal limits with no satellitosis or   platelet clumps identified.     Interpreted by Bambi Lane M.D.          Past Medical History:   Diagnosis Date    Breast disorder     Fibroids.    Herpes simplex virus (HSV) infection     HSV2.     History reviewed. No pertinent surgical history.    Current Outpatient Medications:     valACYclovir (VALTREX) 1000 MG tablet, Take 1 tablet (1,000 mg total) by mouth once daily. As needed for outbreaks. for 5 days, Disp: 30 tablet, Rfl: 6  Review of patient's allergies indicates:  No Known Allergies  OB History    Para Term  AB Living   0 0 0 0 0 0   SAB IAB Ectopic Multiple Live Births   0 0 0 0 0     Social History     Tobacco Use    Smoking status: Never    Smokeless tobacco: Never   Substance Use Topics    Alcohol use: Yes     Comment: Socially.    Drug use: No     Family History   Problem Relation Age of Onset    Hypertension Father     Prostate cancer Father 68    Breast cancer Neg Hx     Colon cancer Neg Hx     Ovarian cancer Neg Hx        Review of Systems   Negative except as in HPI    Physical Exam   Vitals:    24 1332   BP: 116/80     Body mass index is 20.23 kg/m².    Physical Exam  Constitutional:       General: She is not in acute distress.     Appearance: Normal appearance.     Genitourinary:    Vagina, uterus, right adnexa, left adnexa and urethral meatus normal.   The external female genitalia was normal.   No external genitalia lesions identified,Genitalia hair distrobution normal .         No vaginal  discharge or bleeding in the vagina.    No vaginal prolapse present.     No vaginal atrophy present.  Right adnexum displays no mass, no tenderness and no fullness. Left adnexum displays no mass, no tenderness and no fullness. Cervix is normal. Cervix exhibits no motion tenderness and no lesion. Uterus consistancy normal and Uerus contour normal  Uterus is not tender and no mass.    Uterus is anteverted.   HENT:      Head: Normocephalic and atraumatic.   Eyes:      Extraocular Movements: Extraocular movements intact.      Pupils: Pupils are equal, round, and reactive to light.   Pulmonary:      Effort: Pulmonary effort is normal.   Abdominal:      General: Abdomen is flat. There is no distension.      Palpations: Abdomen is soft.      Tenderness: There is no abdominal tenderness. There is no guarding or rebound.   Musculoskeletal:         General: Normal range of motion.      Cervical back: Normal range of motion.   Neurological:      General: No focal deficit present.      Mental Status: She is alert and oriented to person, place, and time.   Skin:     General: Skin is warm and dry.   Psychiatric:         Mood and Affect: Mood normal.         Behavior: Behavior normal.         Thought Content: Thought content normal.   Vitals reviewed.        Labs reviewed: Pap, HPV, MMG, colonoscopy    ASSESSMENT:   Patient Active Problem List   Diagnosis    Iron deficiency anemia due to chronic blood loss    Bartholin cyst    Bartholin's gland abscess       PLAN:  Problem List Items Addressed This Visit          Renal/    Bartholin cyst - Primary    Relevant Orders    CULTURE, AEROBIC  (SPECIFY SOURCE) (Completed)    Culture, Anaerobic (Completed)    Bartholin's gland abscess    Current Assessment & Plan     Bedside I&D performed today, purulent material expressed and cultures obtained. After- care instructions reviewed. Antibiotics prescribed and patient to RTC in ~ 1 week for reexamination. Precautions reviewed.           Relevant Orders    INCISION AND DRAINAGE (Completed)        Total time spent on this encounter was 20 minutes.  This includes preparing to see the patient;  obtaining/reviewing separately obtained history;  performing a medical exam and/or evaluation;   counseling/educating the patient;  ordering medications, tests, of procedures;   referring/communicating with other health care professionals;  EMR documentation;  interpreting/communicating results to the patient;  and/or care coordination.    Follow up in about 1 week (around 1/29/2024) for Follow up.       Madeline Shin MD  Department of Obstetrics & Gynecology  [facility]

## 2024-01-22 NOTE — TELEPHONE ENCOUNTER
Returned pts call. Pt stated that her primary GYN's office had just called and was on the other line with them.

## 2024-01-22 NOTE — TELEPHONE ENCOUNTER
----- Message from Michael Lozada sent at 1/22/2024  3:39 PM CST -----      Name of Who is Calling: ALEXANDRA PASTRANA [[id number]]      What is the request in detail: Pt called to check status of Antibiotics.Please contact to further discuss and advise.          Can the clinic reply by [facility]: Y      What Number to Call Back if not in [facility]: Store #[id number]  [facility] at   [address] [place] [address]  [facility]: [employer] [address] [employer]    Phone  [phone] not actionable to desktop users since it is disabled

## 2024-01-23 ENCOUNTER — HOSPITAL ENCOUNTER (OUTPATIENT)
Dept: RADIOLOGY | Facility: OTHER | Age: 49
Discharge: HOME | End: 2024-01-23
Payer: COMMERCIAL

## 2024-01-23 DIAGNOSIS — Z12.31 ENCOUNTER FOR SCREENING MAMMOGRAM FOR BREAST CANCER: ICD-10-CM

## 2024-01-23 PROCEDURE — 77067 SCR MAMMO BI INCL CAD: CPT | Mod: 26,,, | Performed by: RADIOLOGY

## 2024-01-23 PROCEDURE — 77067 SCR MAMMO BI INCL CAD: CPT | Mod: TC

## 2024-01-23 PROCEDURE — 77063 BREAST TOMOSYNTHESIS BI: CPT | Mod: 26,,, | Performed by: RADIOLOGY

## 2024-01-24 DIAGNOSIS — B37.31 YEAST INFECTION OF THE VAGINA: Primary | ICD-10-CM

## 2024-01-24 RX ORDER — FLUCONAZOLE 150 MG/1
150 TABLET ORAL DAILY
Qty: 1 TABLET | Refills: 1 | Status: CANCELLED | OUTPATIENT
Start: 2024-01-24 | End: 2024-01-26

## 2024-01-24 NOTE — TELEPHONE ENCOUNTER
Returned pt call in regard to prescription request. Pt stated she needed Diflucan to be sent to her preferred pharmacy. I advised pt that Dr. Shin was not in clinic today but the prescription request was sent to her. Pt stated that she needed prescription to be called in today. I advised pt that I would send it to her regular OB/GYN provider, but that I could not grantee it would be filled today. Pt verbalized understanding.

## 2024-01-24 NOTE — TELEPHONE ENCOUNTER
"----- Message from Joceline Carmona sent at 1/24/2024 12:17 PM CST -----  Regarding: Medication  "Type:  Patient Call Back    Who Called:Pt    What is the reqeust in detail:Requesting call back in regards to medication. Please advise    Can the clinic reply by [facility]?no    Best Call Back Number:[phone]      Additional Information:            "

## 2024-01-25 ENCOUNTER — OFFICE VISIT (OUTPATIENT)
Dept: OBSTETRICS AND GYNECOLOGY | Facility: CLINIC | Age: 49
End: 2024-01-25
Payer: COMMERCIAL

## 2024-01-25 VITALS
WEIGHT: 129.06 LBS | BODY MASS INDEX: 20.22 KG/M2 | SYSTOLIC BLOOD PRESSURE: 130 MMHG | DIASTOLIC BLOOD PRESSURE: 88 MMHG

## 2024-01-25 DIAGNOSIS — N75.0 BARTHOLIN CYST: Primary | ICD-10-CM

## 2024-01-25 LAB
BACTERIA SPEC AEROBE CULT: ABNORMAL
BACTERIA SPEC AEROBE CULT: ABNORMAL

## 2024-01-25 PROCEDURE — 99499 UNLISTED E&M SERVICE: CPT | Mod: S$GLB,,, | Performed by: OBSTETRICS & GYNECOLOGY

## 2024-01-25 PROCEDURE — 99999 PR PBB SHADOW E&M-EST. PATIENT-LVL III: CPT | Mod: PBBFAC,,, | Performed by: OBSTETRICS & GYNECOLOGY

## 2024-01-25 PROCEDURE — 58999 UNLISTED PX FML GENITAL SYS: CPT | Mod: S$GLB,,, | Performed by: OBSTETRICS & GYNECOLOGY

## 2024-01-25 PROCEDURE — 88304 TISSUE EXAM BY PATHOLOGIST: CPT | Mod: 26,,, | Performed by: PATHOLOGY

## 2024-01-25 PROCEDURE — 88304 TISSUE EXAM BY PATHOLOGIST: CPT | Performed by: PATHOLOGY

## 2024-01-25 RX ORDER — FLUCONAZOLE 150 MG/1
150 TABLET ORAL ONCE
Qty: 1 TABLET | Refills: 0 | Status: SHIPPED | OUTPATIENT
Start: 2024-01-25 | End: 2024-01-26 | Stop reason: SDUPTHER

## 2024-01-26 ENCOUNTER — PATIENT MESSAGE (OUTPATIENT)
Dept: OBSTETRICS AND GYNECOLOGY | Facility: CLINIC | Age: 49
End: 2024-01-26
Payer: COMMERCIAL

## 2024-01-26 DIAGNOSIS — A60.9 HSV (HERPES SIMPLEX VIRUS) ANOGENITAL INFECTION: ICD-10-CM

## 2024-01-26 DIAGNOSIS — B37.31 YEAST VAGINITIS: ICD-10-CM

## 2024-01-26 RX ORDER — VALACYCLOVIR HYDROCHLORIDE 1 G/1
1000 TABLET, FILM COATED ORAL DAILY
Qty: 30 TABLET | Refills: 6 | Status: SHIPPED | OUTPATIENT
Start: 2024-01-26 | End: 2024-01-31

## 2024-01-26 RX ORDER — FLUCONAZOLE 150 MG/1
150 TABLET ORAL ONCE
Qty: 1 TABLET | Refills: 0 | Status: SHIPPED | OUTPATIENT
Start: 2024-01-26 | End: 2024-01-26

## 2024-01-28 LAB — BACTERIA SPEC ANAEROBE CULT: ABNORMAL

## 2024-01-30 LAB
FINAL PATHOLOGIC DIAGNOSIS: NORMAL
GROSS: NORMAL
Lab: NORMAL

## 2024-01-31 RX ORDER — FLUCONAZOLE 150 MG/1
150 TABLET ORAL ONCE
Qty: 1 TABLET | Refills: 2 | Status: SHIPPED | OUTPATIENT
Start: 2024-01-31 | End: 2024-01-31

## 2024-02-01 ENCOUNTER — OFFICE VISIT (OUTPATIENT)
Dept: OBSTETRICS AND GYNECOLOGY | Facility: CLINIC | Age: 49
End: 2024-02-01
Payer: COMMERCIAL

## 2024-02-01 VITALS — SYSTOLIC BLOOD PRESSURE: 126 MMHG | WEIGHT: 129 LBS | DIASTOLIC BLOOD PRESSURE: 82 MMHG | BODY MASS INDEX: 20.2 KG/M2

## 2024-02-01 DIAGNOSIS — N75.1 BARTHOLIN'S GLAND ABSCESS: Primary | ICD-10-CM

## 2024-02-01 DIAGNOSIS — B37.31 YEAST VAGINITIS: ICD-10-CM

## 2024-02-01 PROCEDURE — 1159F MED LIST DOCD IN RCRD: CPT | Mod: CPTII,S$GLB,, | Performed by: OBSTETRICS & GYNECOLOGY

## 2024-02-01 PROCEDURE — 99214 OFFICE O/P EST MOD 30 MIN: CPT | Mod: S$GLB,,, | Performed by: OBSTETRICS & GYNECOLOGY

## 2024-02-01 PROCEDURE — 3074F SYST BP LT 130 MM HG: CPT | Mod: CPTII,S$GLB,, | Performed by: OBSTETRICS & GYNECOLOGY

## 2024-02-01 PROCEDURE — 3079F DIAST BP 80-89 MM HG: CPT | Mod: CPTII,S$GLB,, | Performed by: OBSTETRICS & GYNECOLOGY

## 2024-02-01 PROCEDURE — 1160F RVW MEDS BY RX/DR IN RCRD: CPT | Mod: CPTII,S$GLB,, | Performed by: OBSTETRICS & GYNECOLOGY

## 2024-02-01 PROCEDURE — 99999 PR PBB SHADOW E&M-EST. PATIENT-LVL III: CPT | Mod: PBBFAC,,, | Performed by: OBSTETRICS & GYNECOLOGY

## 2024-02-01 PROCEDURE — 3008F BODY MASS INDEX DOCD: CPT | Mod: CPTII,S$GLB,, | Performed by: OBSTETRICS & GYNECOLOGY

## 2024-02-01 RX ORDER — FLUCONAZOLE 150 MG/1
150 TABLET ORAL DAILY
Qty: 1 TABLET | Refills: 0 | Status: SHIPPED | OUTPATIENT
Start: 2024-02-01 | End: 2024-02-02

## 2024-02-03 PROBLEM — N75.1 BARTHOLIN'S GLAND ABSCESS: Status: ACTIVE | Noted: 2024-02-03

## 2024-02-03 NOTE — PROGRESS NOTES
Chief Complaint   Patient presents with    Follow-up     Bartholin's cyst        HPI:   48 y.o.  here today for follow up of I&D of 3-4 cm right sided Bartholin cyst abscess. Initially I&D'ed on 24, brought back a few days later for biopsy due to age > 45 and attempted Word catheter placement, unsuccessful due to cyst decompression despite multiple attempts. S/p 7 days oral Bactrim. Pain and swelling has improved but still with significant vulvar irritation and thick white discharge despite treatment with Diflucan x2.      LMP Dates from Last 1 Encounters:   LMP: 01/10/2024       Labs / Significant Studies  Pap (2021): NILM/HPV neg    Office Visit on 2024   Component Date Value Ref Range Status    Final Pathologic Diagnosis 2024 Fragment of benign squamous mucosa with marked inflammation and necrosis compatible with a Bartholin's gland duct cyst abscess.   Final    Interp By John Zamorano M.D., Signed on 2024 at 09:19    Gross 2024    Final                    Value:Hospital/clinic label MRN:  [id number]  Pathology label MRN:  [id number]    The specimen is received in formalin labeled &quot;Bartholin cyst biopsy&quot;.  The specimen consists of a tan-brown cyst measuring 0.6 x 0.3 x 0.2 cm.  The specimen is submitted entirely in cassette YPL--1-A.      RITIKA Robles  Grossing Technologist      Disclaimer 2024 Unless the case is a 'gross only' or additional testing only, the final diagnosis for each specimen is based on a microscopic examination of appropriate tissue sections.   Final   Office Visit on 2024   Component Date Value Ref Range Status    Aerobic Bacterial Culture 2024  (A)   Final                    Value:STREPTOCOCCUS AGALACTIAE (GROUP B)  Many  Beta-hemolytic streptococci are routinely susceptible to   penicillins,cephalosporins and carbapenems.  Susceptibility testing not routinely performed      Aerobic Bacterial Culture 2024  (A)    Final                    Value:ACTINOMYCES NEUII   Many      Anaerobic Culture 2024  (A)   Final                    Value:PREVOTELLA (B.) BIVIA  Moderate          Past Medical History:   Diagnosis Date    Breast disorder     Fibroids.    Herpes simplex virus (HSV) infection     HSV2.     History reviewed. No pertinent surgical history.    Current Outpatient Medications:     valACYclovir (VALTREX) 1000 MG tablet, Take 1 tablet (1,000 mg total) by mouth once daily. As needed for outbreaks. for 5 days, Disp: 30 tablet, Rfl: 6  Review of patient's allergies indicates:  No Known Allergies  OB History    Para Term  AB Living   0 0 0 0 0 0   SAB IAB Ectopic Multiple Live Births   0 0 0 0 0     Social History     Tobacco Use    Smoking status: Never    Smokeless tobacco: Never   Substance Use Topics    Alcohol use: Yes     Comment: Socially.    Drug use: No     Family History   Problem Relation Age of Onset    Hypertension Father     Prostate cancer Father 68    Breast cancer Neg Hx     Colon cancer Neg Hx     Ovarian cancer Neg Hx        Review of Systems   Negative except as in HPI    Physical Exam   Vitals:    24 1639   BP: 126/82     Body mass index is 20.2 kg/m².    Physical Exam  Constitutional:       General: She is not in acute distress.     Appearance: Normal appearance.     Genitourinary:    Vulva, uterus, right adnexa, left adnexa and urethral meatus normal.   The external female genitalia was normal.   No external genitalia lesions identified,Genitalia hair distrobution normal .         There is vaginal discharge (copious thick white discharge in vault) in the vagina. No bleeding in the vagina.    No vaginal prolapse present.     No vaginal atrophy present.  Right adnexum displays no mass, no tenderness and no fullness. Left adnexum displays no mass, no tenderness and no fullness. Cervix is normal. Cervix exhibits no motion tenderness and no lesion. Uterus consistancy normal and Uerus  contour normal  Uterus is not tender and no mass.    Uterus is anteverted.   HENT:      Head: Normocephalic and atraumatic.   Eyes:      Extraocular Movements: Extraocular movements intact.      Pupils: Pupils are equal, round, and reactive to light.   Pulmonary:      Effort: Pulmonary effort is normal.   Abdominal:      General: Abdomen is flat. There is no distension.      Palpations: Abdomen is soft.      Tenderness: There is no abdominal tenderness. There is no guarding or rebound.   Musculoskeletal:         General: Normal range of motion.      Cervical back: Normal range of motion.   Neurological:      General: No focal deficit present.      Mental Status: She is alert and oriented to person, place, and time.   Skin:     General: Skin is warm and dry.   Psychiatric:         Mood and Affect: Mood normal.         Behavior: Behavior normal.         Thought Content: Thought content normal.   Vitals reviewed.          Labs reviewed: Biopsy path, pap/HPV, aerobic/anaerobic cultures    ASSESSMENT:   Patient Active Problem List   Diagnosis    Iron deficiency anemia due to chronic blood loss    Bartholin cyst    Bartholin's gland abscess       PLAN:  Problem List Items Addressed This Visit          Renal/    Bartholin's gland abscess - Primary    Current Assessment & Plan     Resolved s/p I&D, attempted Word catheter placement but unsuccessful due to decompression of cyst wall/inability to insert catheter into cyst. S/p 7 days oral Bactrim. Now with continued copious thick white discharge c/w yeast. Wait another 1-2 weeks before resuming intercourse.     Right sided cyst has not re-accumulated and I&D site well healed with no surrounding edema or erythema. Biopsy of I&D site benign. Precautions reviewed. Can continue sitz baths/warm compresses PRN.           Other Visit Diagnoses       Yeast vaginitis                 Total time spent on this encounter was 20 minutes.  This includes preparing to see the patient;   obtaining/reviewing separately obtained history;  performing a medical exam and/or evaluation;   counseling/educating the patient;  ordering medications, tests, of procedures;   referring/communicating with other health care professionals;  EMR documentation;  interpreting/communicating results to the patient;  and/or care coordination.    Follow up if symptoms worsen or fail to improve.       Madeline Shin MD  Department of Obstetrics & Gynecology  [facility]

## 2024-02-09 ENCOUNTER — HOSPITAL ENCOUNTER (OUTPATIENT)
Dept: RADIOLOGY | Facility: OTHER | Age: 49
Discharge: HOME | End: 2024-02-09
Attending: ORTHOPAEDIC SURGERY
Payer: COMMERCIAL

## 2024-02-09 DIAGNOSIS — M25.511 PAIN IN RIGHT SHOULDER: ICD-10-CM

## 2024-02-09 PROCEDURE — 73221 MRI JOINT UPR EXTREM W/O DYE: CPT | Mod: TC,RT

## 2024-02-09 PROCEDURE — 73221 MRI JOINT UPR EXTREM W/O DYE: CPT | Mod: 26,RT,, | Performed by: INTERNAL MEDICINE

## 2024-02-21 NOTE — PROCEDURES
Procedures    Procedure: Biopsy of Bartholin cyst wall and attempted placement of Word Catheter  Surgeon: Dr. Madeline Shin  Indication: Symptomatic Bartholin Cyst    Risks, benefits, and alternatives of vulvar biopsy, Bartholin cyst drainage and Word catheter placement explained to the patient including pain, infection, bleeding, and cyst recurrence necessitating additional interventions. Written informed consent was obtained and all questions answered to the patient's satisfaction.    Timeout was performed prior to procedure to confirm the correct patient, procedure, and location.     The Bartholin gland cyst was identified and cleaned with povidone iodine. 2 mL of lidocaine was injected subcutaneously around the location of the biopsy site at the posterior base of the cyst on the patient's right. Adequate anesthesia was confirmed. A 4 mm punch biopsy was obtained in the usual manner at the site of previous incision and drainage with attempt at obtaining cyst wall in the specimen. The cyst wall was identified and grasped and attempted to enter the cyst cavity with hemostats. Despite several attempts, the cyst cavity was unable to be entered and the Word catheter balloon was unable to be placed. An additional pocket of cyst fluid was encountered and drained. The area was left open to drain.      The patient tolerated the procedure well with no complications.    She was counseled to change peripads frequently, avoid tub baths, douching, sex, and tampons, and return to clinic in 1 week or sooner for recurrence of symptoms.      Madeline Shin MD  Department of Obstetrics & Gynecology  [facility]

## 2024-02-21 NOTE — ASSESSMENT & PLAN NOTE
Bedside I&D performed today, purulent material expressed and cultures obtained. After- care instructions reviewed. Antibiotics prescribed and patient to RTC in ~ 1 week for reexamination. Precautions reviewed.

## 2024-03-18 ENCOUNTER — CLINICAL SUPPORT (OUTPATIENT)
Dept: REHABILITATION | Facility: HOSPITAL | Age: 49
End: 2024-03-18
Payer: COMMERCIAL

## 2024-03-18 DIAGNOSIS — M25.611 DECREASED RIGHT SHOULDER RANGE OF MOTION: ICD-10-CM

## 2024-03-18 DIAGNOSIS — M25.511 CHRONIC RIGHT SHOULDER PAIN: ICD-10-CM

## 2024-03-18 DIAGNOSIS — G89.29 CHRONIC RIGHT SHOULDER PAIN: ICD-10-CM

## 2024-03-18 DIAGNOSIS — R29.898 DECREASED STRENGTH OF UPPER EXTREMITY: ICD-10-CM

## 2024-03-18 PROCEDURE — 97112 NEUROMUSCULAR REEDUCATION: CPT

## 2024-03-18 PROCEDURE — 97161 PT EVAL LOW COMPLEX 20 MIN: CPT

## 2024-03-20 PROBLEM — M25.511 CHRONIC RIGHT SHOULDER PAIN: Status: ACTIVE | Noted: 2024-03-20

## 2024-03-20 PROBLEM — M25.611 DECREASED RIGHT SHOULDER RANGE OF MOTION: Status: ACTIVE | Noted: 2024-03-20

## 2024-03-20 PROBLEM — R29.898 DECREASED STRENGTH OF UPPER EXTREMITY: Status: ACTIVE | Noted: 2024-03-20

## 2024-03-20 PROBLEM — G89.29 CHRONIC RIGHT SHOULDER PAIN: Status: ACTIVE | Noted: 2024-03-20

## 2024-03-20 NOTE — PLAN OF CARE
[facility]   Physical Therapy Initial Evaluation      Name: Surinder Elizabeth  Clinic Number: [id number]    Therapy Diagnosis:   Encounter Diagnoses   Name Primary?    Chronic right shoulder pain     Decreased right shoulder range of motion     Decreased strength of upper extremity         Physician: Order, Paper    Physician Orders: PT Eval and Treat   Medical Diagnosis from Referral: M25.611 (ICD-10-CM) - Stiffness of sternoclavicular joint, right  Evaluation Date: 3/18/2024  Authorization Period Expiration: 24  Plan of Care Expiration: 24  Progress Note Due: 24  Date of Surgery: None  Visit # / Visits authorized:    FOTO: 1/ 3    Precautions: Standard     Time In: 9:10 AM   Time Out: 10:10 AM  Total Billable Time: 60 minutes    Subjective     Date of onset: > 2 years ago     History of current condition - Surinder reports: a history of right shoulder pain that began more than 2 years ago. She cannot recall a specific mechanism of injury, yet states it started to bother her more after she returned to boxing for exercise. She states her pain is located at the top of her right shoulder and describes it has deep, dull, and aching. She denies any numbness or tingling down the right upper extremity. Current aggravators of her right shoulder pain includes reaching up, to the side, and behind her back. Eases have included going to the chiropractor, attending physical therapy for this condition before, and cortisone shots as she got her last one in early 2024. Surinder would like to attend physical therapy to improve her range of motion and decrease her pain so she may perform all desired activities such as exercising without limitations.       Imagin24 R Shoulder MRI   Pain in right shoulder     TECHNIQUE:  Multiplanar multislice images are were performed through the right shoulder.     COMPARISON:  MRI of the right shoulder without intravenous contrast.      FINDINGS:  Coracoacromial arch: The acromioclavicular joint is unremarkable.  No subacromial subdeltoid bursal fluid.     Rotator cuff: No rotator cuff tear or focal muscle atrophy.     Glenoid labrum: Curvilinear hyperintense signal undercutting the inferior labrum at the chondrolabral junction, best seen on 5:7, concerning for nondisplaced tear.     Biceps tendon: The biceps tendon long head is intact at the superior glenoid tubercle and is normally positioned in the biceps groove.     Bone: No fracture, osteonecrosis, or focal lesion.     Joint: No cartilage defect. No joint effusion, synovitis, or capsulitis.     Impression:     Questionable nondisplaced inferior labral tear.  MR arthrogram may be helpful.     Prior Therapy: Patient has attended physical therapy for this condition before.   Occupation: [employer]  Prior Level of Function: Able to perform all ADLs/IADLs and exercise without right shoulder pain  Current Level of Function: Limited range of motion, difficulty reaching behind back, and to the side    Pain:  Current 0/10, worst 3/10, best 0/10   Location: Right superior shoulder     Description: Dull, Aching  Aggravating Factors: Reaching up, to the side, and behind her back  Easing Factors: Going to the chiropractor, attending physical therapy     Patients goals: Improve her range of motion, decrease her pain     Medical History:   Past Medical History:   Diagnosis Date    Breast disorder     Fibroids.    Herpes simplex virus (HSV) infection     HSV2.       Surgical History:   Surinder Elizabeth  has no past surgical history on file.    Medications:   Surinder has a current medication list which includes the following prescription(s): valacyclovir.    Allergies:   Review of patient's allergies indicates:  No Known Allergies     Objective      Observation: Patient presents to physical therapy in no apparent distress. She appears healthy.     Posture: Upon observation of postural, Surinder demonstrates right shoulder  elevation, right scapular winging, and hinging at the TL junction during overhead shoulder range of motion      Active/Passive Range of Motion (*= pain):   Shoulder Right Left   Flexion 145/160 175/180   Abduction 160/120 (pain) 180/180   ER at 90 85/65 ( pain)  105/110   IR T8/70 T4/70      Strength (*= pain):  Shoulder Right Left   Flexion 4/5 5/5   Abduction 4-/5 5/5   ER 4-/5 5/5   IR 4/5 5/5   Serratus Anterior 4-/5 4+/5   Upper Trap 4/5 4/5   Middle Trap 3-/5 3+/5   Lower Trap 3/5 3+/5       Special Tests:   Right Left   Biceps Load II Test -  -    Crank Test + -    Jerk Test -  -    Scapular Assistance Test +  -    Hawkin's Jaspal Test +  -    Neer's Test +  -        Joint Mobility: Decreased R GH joint posterior and inferior mobility compared to the L GH joint    Flexibility:   - Latissimus dorsi: + R side   - Posterior cuff: - bilaterally  - Pectoralis major: - bilaterally  - Pectoralis minor: + R side      Intake Outcome Measure for FOTO  Survey    Therapist reviewed FOTO scores for Surinder Elizabeth on 3/18/2024.   FOTO report - see Media section or FOTO account episode details.    Intake Score: See media section          Treatment     Total Treatment time (time-based codes) separate from Evaluation: 24 minutes     Surinder received the treatments listed below:      therapeutic exercises to develop strength, endurance, ROM, flexibility, posture, and core stabilization for 00 minutes including:      manual therapy techniques: Joint mobilizations, Manual traction, Myofacial release, Manual Lymphatic Drainage, Soft tissue Mobilization, and Friction Massage were applied to the: right shoulder for 00 minutes, including:      neuromuscular re-education activities to improve: Balance, Coordination, Kinesthetic, Sense, Proprioception, and Posture for 24 minutes. The following activities were included:    Wall slides 2 x 15   Robots on wall 3 x 10   Inferior GH joint mobilization with 5 lb weight x 5'     therapeutic  activities to improve functional performance for 00 minutes, including:        Patient Education and Home Exercises     Education provided:   - Patient was educated upon diagnosis, prognosis, and performance of her HEP.     Written Home Exercises Provided: yes. Exercises were reviewed and Surinder was able to demonstrate them prior to the end of the session.  Surinder demonstrated good  understanding of the education provided. See EMR under Patient Instructions for exercises provided during therapy sessions.    Assessment     Surinder is a 48 y.o. female referred to outpatient Physical Therapy with a medical diagnosis of stiffness of sternoclavicular joint, right. Patient presents with limited right shoulder range of motion (flexion/abduction), weakness of the middle and lower traps, stiffness of the lats and pec minor, and positive tests for labral involvement and shoulder impingement. Symptoms were relieved with a scapular assistance test. Surinder will benefit from a course of physical therapy to improve her ability to activate her scapula upward rotators, external rotators, and muscles that posteriorly tilt the scapula to allow for improved range of motion and function with daily and recreational tasks.     Patient prognosis is Excellent.   Patient will benefit from skilled outpatient Physical Therapy to address the deficits stated above and in the chart below, provide patient /family education, and to maximize patientt's level of independence.     Plan of care discussed with patient: Yes  Patient's spiritual, cultural and educational needs considered and patient is agreeable to the plan of care and goals as stated below:     Anticipated Barriers for therapy: N/A     Medical Necessity is demonstrated by the following  History  Co-morbidities and personal factors that may impact the plan of care [x] LOW: no personal factors / co-morbidities  [] MODERATE: 1-2 personal factors / co-morbidities  [] HIGH: 3+ personal factors /  co-morbidities    Moderate / High Support Documentation:   Co-morbidities affecting plan of care:     Personal Factors:   no deficits     Examination  Body Structures and Functions, activity limitations and participation restrictions that may impact the plan of care [x] LOW: addressing 1-2 elements  [] MODERATE: 3+ elements  [] HIGH: 4+ elements (please support below)    Moderate / High Support Documentation:      Clinical Presentation [x] LOW: stable  [] MODERATE: Evolving  [] HIGH: Unstable     Decision Making/ Complexity Score: low       Goals:  Short Term Goals: 0-5 weeks   Patient will be compliant with her HEP 75% of the prescribed amount.  Patient will demonstrate normal right latissimus dorsi and pectoralis minor length in 4-5 weeks.  Patient will improve her right shoulder overhead motion (flexion/abduction) equal to her left in 4-5 weeks.   Patient will improve her right lower and middle trap strength to at least 4-/5 in 5 weeks.   Patient will demonstrate normal scapulohumeral rhythm in 5 weeks.   Patient will improve her FOTO score to >75 in 5 weeks.     Long Term Goals: 5-10 weeks   1. Patient will be compliant with her % of the prescribed amount.   2. Patient will improve her right lower and middle trap strength to 4+/5 in 8 weeks.   3. Patient will improve her RTC strength to at least 4+/5 to allow for improved humeral head positioning in 8 weeks.   4. Patient will be able to box and perform all desired workouts at the gym without compensation in 10 weeks.     Plan     Plan of care Certification: 3/18/2024 to 6/18/24.    Outpatient Physical Therapy 2 times weekly for 10 weeks to include the following interventions: Electrical Stimulation  , Manual Therapy, Moist Heat/ Ice, Neuromuscular Re-ed, Patient Education, Self Care, Therapeutic Activities, and Therapeutic Exercise.     Constantine Lara, PT, DPT

## 2024-03-27 ENCOUNTER — CLINICAL SUPPORT (OUTPATIENT)
Dept: REHABILITATION | Facility: HOSPITAL | Age: 49
End: 2024-03-27
Payer: COMMERCIAL

## 2024-03-27 DIAGNOSIS — M25.511 CHRONIC RIGHT SHOULDER PAIN: Primary | ICD-10-CM

## 2024-03-27 DIAGNOSIS — M25.611 DECREASED RIGHT SHOULDER RANGE OF MOTION: ICD-10-CM

## 2024-03-27 DIAGNOSIS — G89.29 CHRONIC RIGHT SHOULDER PAIN: Primary | ICD-10-CM

## 2024-03-27 DIAGNOSIS — R29.898 DECREASED STRENGTH OF UPPER EXTREMITY: ICD-10-CM

## 2024-03-27 PROCEDURE — 97112 NEUROMUSCULAR REEDUCATION: CPT

## 2024-03-27 PROCEDURE — 97110 THERAPEUTIC EXERCISES: CPT

## 2024-03-27 PROCEDURE — 97140 MANUAL THERAPY 1/> REGIONS: CPT

## 2024-03-27 NOTE — PROGRESS NOTES
"[facility]   Physical Therapy Treatment Note     Name: Surinder Elizabeth  Clinic Number: [id number]    Therapy Diagnosis:   Encounter Diagnoses   Name Primary?    Chronic right shoulder pain Yes    Decreased right shoulder range of motion     Decreased strength of upper extremity      Physician: Order, Paper    Visit Date: 3/27/2024    Physician Orders: PT Eval and Treat   Medical Diagnosis from Referral: M25.611 (ICD-10-CM) - Stiffness of sternoclavicular joint, right  Evaluation Date: 3/18/2024  Authorization Period Expiration: 12/31/24  Plan of Care Expiration: 6/18/24  Progress Note Due: 4/29/24  Date of Surgery: None  Visit # / Visits authorized: 1/ 20 + eval    FOTO: 1/ 3     Precautions: Standard      Time In: 3:30 PM   Time Out: 4:30 PM   Total Billable Time: 45 minutes      SUBJECTIVE     Pt reports: she has been working on her home exercises, has noticed a minor improvement in her right shoulder range range of motion.     She was compliant with home exercise program.  Response to previous treatment: Good   Functional change: Too early     Pain: Not verbalized/10  Location: Right superior shoulder    OBJECTIVE     3/27/24  R shoulder PROM   Flexion: 153 degrees   External Rotation at side: 65 degrees     Treatment       Surinder received the treatments listed below:      Therapeutic exercises to develop strength, endurance, ROM, flexibility, posture, and core stabilization for 14 minutes including:    IR/ER walkouts 3 x 10 with GTB   LT phase I x 20 with 3" hold       Manual therapy techniques: Joint mobilizations, Manual traction, Myofacial release, Manual Lymphatic Drainage, Soft tissue Mobilization, and Friction Massage were applied to the: right shoulder for 13 minutes, including:    Posterior/inferior GH joint mobilizations grade II/III  Lat hold/relax technique x 10   SA kick out side lying 2 x 10     Next session: SL scapula upward rotation     Therapeutic activities to improve " "functional performance for 00 minutes, including:      Neuromuscular re-education activities to improve: Balance, Coordination, Kinesthetic, Sense, Proprioception, and Posture for 33 minutes. The following activities were included:     Side lying elevation with dowel 2 x 10   Prone retractions x 20 with 5" hold   Supine robots x 20   Quadruped rock backs x 20 with 5" hold   Inferior GH joint mobilization with 5 lb weight x 5'       Patient Education and Home Exercises     Home Exercises Provided and Patient Education Provided     Education provided:   - Patient was educated upon diagnosis, prognosis, and performance of her HEP.     Written Home Exercises Provided: yes. Exercises were reviewed and Surinder was able to demonstrate them prior to the end of the session.  Surinder demonstrated good  understanding of the education provided. See EMR under Patient Instructions for exercises provided during therapy sessions    ASSESSMENT     Surinder presents with mild improvements in right shoulder elevation compared to the initial evaluation. Session focused on activating the patient's scapula upward rotators while addressing lat mobility deficits. Next session, manual facilitated scapula upward rotation to be performed to improve scapulohumeral rhythm.     Surinder Is progressing well towards her goals.     Pt prognosis is Excellent.     Pt will continue to benefit from skilled outpatient physical therapy to address the deficits listed in the problem list box on initial evaluation, provide pt/family education and to maximize pt's level of independence in the home and community environment.     Pt's spiritual, cultural and educational needs considered and pt agreeable to plan of care and goals.     Anticipated barriers to physical therapy: None    Goals:   Short Term Goals: 0-5 weeks   Patient will be compliant with her HEP 75% of the prescribed amount.  Patient will demonstrate normal right latissimus dorsi and pectoralis minor length in " 4-5 weeks.  Patient will improve her right shoulder overhead motion (flexion/abduction) equal to her left in 4-5 weeks.   Patient will improve her right lower and middle trap strength to at least 4-/5 in 5 weeks.   Patient will demonstrate normal scapulohumeral rhythm in 5 weeks.   Patient will improve her FOTO score to >75 in 5 weeks.      Long Term Goals: 5-10 weeks   1. Patient will be compliant with her % of the prescribed amount.   2. Patient will improve her right lower and middle trap strength to 4+/5 in 8 weeks.   3. Patient will improve her RTC strength to at least 4+/5 to allow for improved humeral head positioning in 8 weeks.   4. Patient will be able to box and perform all desired workouts at the gym without compensation in 10 weeks.    PLAN     Address postural deficits while seeking to improve patient's ability to activate her scapula upward rotators.     Constantine Lara, PT, DPT

## 2024-04-01 ENCOUNTER — CLINICAL SUPPORT (OUTPATIENT)
Dept: REHABILITATION | Facility: HOSPITAL | Age: 49
End: 2024-04-01
Payer: COMMERCIAL

## 2024-04-01 DIAGNOSIS — G89.29 CHRONIC RIGHT SHOULDER PAIN: Primary | ICD-10-CM

## 2024-04-01 DIAGNOSIS — R29.898 DECREASED STRENGTH OF UPPER EXTREMITY: ICD-10-CM

## 2024-04-01 DIAGNOSIS — M25.511 CHRONIC RIGHT SHOULDER PAIN: Primary | ICD-10-CM

## 2024-04-01 DIAGNOSIS — M25.611 DECREASED RIGHT SHOULDER RANGE OF MOTION: ICD-10-CM

## 2024-04-01 PROCEDURE — 97110 THERAPEUTIC EXERCISES: CPT

## 2024-04-01 PROCEDURE — 97140 MANUAL THERAPY 1/> REGIONS: CPT

## 2024-04-01 PROCEDURE — 97112 NEUROMUSCULAR REEDUCATION: CPT

## 2024-04-01 NOTE — PROGRESS NOTES
"[facility]   Physical Therapy Treatment Note     Name: Surinder Elizabeth  [facility] Number: [id number]    Therapy Diagnosis:   Encounter Diagnoses   Name Primary?    Chronic right shoulder pain Yes    Decreased right shoulder range of motion     Decreased strength of upper extremity      Physician: Order, Paper    Visit Date: 4/1/2024    Physician Orders: PT Eval and Treat   Medical Diagnosis from Referral: M25.611 (ICD-10-CM) - Stiffness of sternoclavicular joint, right  Evaluation Date: 3/18/2024  Authorization Period Expiration: 12/31/24  Plan of Care Expiration: 6/18/24  Progress Note Due: 4/29/24  Date of Surgery: None  Visit # / Visits authorized: 2/ 20 + eval    FOTO: 1/ 3     Precautions: Standard      Time In: 1:35 PM   Time Out: 2:35 PM   Total Billable Time: 60 minutes      SUBJECTIVE     Pt reports: she has mild left cervical spine pain that started over the weekend. She states it feels tight and is difficult for her to look over her left shoulder. Shoulder discomfort remains when the patient rotates her arm backwards.       She was compliant with home exercise program.  Response to previous treatment: Good   Functional change: Too early     Pain: 1/10  Location: Right superior shoulder    OBJECTIVE     4/1/24  R shoulder PROM   Flexion: 155 degrees   External Rotation at side: 65 degrees     Treatment       Surinder received the treatments listed below:      Therapeutic exercises to develop strength, endurance, ROM, flexibility, posture, and core stabilization for 12 minutes including:    ER dowel 10 x 10"   ER (YTB)/IR (OTB) strengthening at side 3 x 10     NP   LT phase I x 20 with 3" hold     Manual therapy techniques: Joint mobilizations, Manual traction, Myofacial release, Manual Lymphatic Drainage, Soft tissue Mobilization, and Friction Massage were applied to the: right shoulder for 20 minutes, including:    Cervical segmental mobility assessment   Cervical side glides   Cervical " "rotation MET  Cervical OA joint mobilizations   SA kick out side lying 2 x 10 with 5" hold   SL scapula upward rotation       Therapeutic activities to improve functional performance for 00 minutes, including:      Neuromuscular re-education activities to improve: Balance, Coordination, Kinesthetic, Sense, Proprioception, and Posture for 28 minutes. The following activities were included:     Supine robots x 20   Wall slides 2 x 15  Standing lower trap lift off wall x 20 with 3" holds   Quadruped rock backs x 20 with 5" hold   Inferior GH joint mobilization with 5 lb weight x 5'       Patient Education and Home Exercises     Home Exercises Provided and Patient Education Provided     Education provided:   - Patient was educated upon diagnosis, prognosis, and performance of her HEP.     Written Home Exercises Provided: yes. Exercises were reviewed and Surinder was able to demonstrate them prior to the end of the session.  Surinder demonstrated good  understanding of the education provided. See EMR under Patient Instructions for exercises provided during therapy sessions    ASSESSMENT     Surinder demonstrated limited left cervical rotation which improved following cervical side glides and left rotation METs. She continues to display a mild limitation in left shoulder elevation as manual scapula upward rotation was performed. Continue to address muscle length deficits, GH joint mobility deficits, and activation of the scapula upward rotators.     Surinder Is progressing well towards her goals.     Pt prognosis is Excellent.     Pt will continue to benefit from skilled outpatient physical therapy to address the deficits listed in the problem list box on initial evaluation, provide pt/family education and to maximize pt's level of independence in the home and community environment.     Pt's spiritual, cultural and educational needs considered and pt agreeable to plan of care and goals.     Anticipated barriers to physical therapy: " None    Goals:   Short Term Goals: 0-5 weeks   Patient will be compliant with her HEP 75% of the prescribed amount.  Patient will demonstrate normal right latissimus dorsi and pectoralis minor length in 4-5 weeks.  Patient will improve her right shoulder overhead motion (flexion/abduction) equal to her left in 4-5 weeks.   Patient will improve her right lower and middle trap strength to at least 4-/5 in 5 weeks.   Patient will demonstrate normal scapulohumeral rhythm in 5 weeks.   Patient will improve her FOTO score to >75 in 5 weeks.      Long Term Goals: 5-10 weeks   1. Patient will be compliant with her % of the prescribed amount.   2. Patient will improve her right lower and middle trap strength to 4+/5 in 8 weeks.   3. Patient will improve her RTC strength to at least 4+/5 to allow for improved humeral head positioning in 8 weeks.   4. Patient will be able to box and perform all desired workouts at the gym without compensation in 10 weeks.    PLAN     Address postural deficits while seeking to improve patient's ability to activate her scapula upward rotators.     Constantine Lara, PT, DPT

## 2024-04-03 ENCOUNTER — CLINICAL SUPPORT (OUTPATIENT)
Dept: REHABILITATION | Facility: HOSPITAL | Age: 49
End: 2024-04-03
Payer: COMMERCIAL

## 2024-04-03 DIAGNOSIS — R29.898 DECREASED STRENGTH OF UPPER EXTREMITY: ICD-10-CM

## 2024-04-03 DIAGNOSIS — M25.511 CHRONIC RIGHT SHOULDER PAIN: Primary | ICD-10-CM

## 2024-04-03 DIAGNOSIS — G89.29 CHRONIC RIGHT SHOULDER PAIN: Primary | ICD-10-CM

## 2024-04-03 DIAGNOSIS — M25.611 DECREASED RIGHT SHOULDER RANGE OF MOTION: ICD-10-CM

## 2024-04-03 PROCEDURE — 97140 MANUAL THERAPY 1/> REGIONS: CPT

## 2024-04-03 PROCEDURE — 97112 NEUROMUSCULAR REEDUCATION: CPT

## 2024-04-03 NOTE — PROGRESS NOTES
"[facility]   Physical Therapy Treatment Note     Name: Surinder Elizabeth  [facility] Number: [id number]    Therapy Diagnosis:   Encounter Diagnoses   Name Primary?    Chronic right shoulder pain Yes    Decreased right shoulder range of motion     Decreased strength of upper extremity      Physician: Order, Paper    Visit Date: 4/3/2024    Physician Orders: PT Eval and Treat   Medical Diagnosis from Referral: M25.611 (ICD-10-CM) - Stiffness of sternoclavicular joint, right  Evaluation Date: 3/18/2024  Authorization Period Expiration: 12/31/24  Plan of Care Expiration: 6/18/24  Progress Note Due: 4/29/24  Date of Surgery: None  Visit # / Visits authorized: 3/ 20 + eval    FOTO: 1/ 3     Precautions: Standard      Time In: 1:40 PM   Time Out: 2:20 PM   Total Billable Time: 25 minutes      SUBJECTIVE     Pt reports: her neck pain has reduced since last visit. She states she has been performing her home exercises without right shoulder pain.       She was compliant with home exercise program.  Response to previous treatment: Good   Functional change: Too early     Pain: 0/10  Location: Right superior shoulder    OBJECTIVE     4/3/24  R shoulder AROM   Flexion: 150 pre-tx/160 post-tx   External Rotation at side: 65 degrees     Treatment       Surinder received the treatments listed below:      Therapeutic exercises to develop strength, endurance, ROM, flexibility, posture, and core stabilization for 11` minutes including:    Pulleys x 3'   ER (YTB)/IR (GTB) at side 3 x 10     Next session: progress to 90 degrees in frontal plane    NP   LT phase I x 20 with 3" hold     Manual therapy techniques: Joint mobilizations, Manual traction, Myofacial release, Manual Lymphatic Drainage, Soft tissue Mobilization, and Friction Massage were applied to the: right shoulder for 09 minutes, including:    Posterior/inferior GH joint mobilizations  SA kick out side lying 2 x 10 with 5" hold   SL scapula upward rotation " "      Therapeutic activities to improve functional performance for 00 minutes, including:      Neuromuscular re-education activities to improve: Balance, Coordination, Kinesthetic, Sense, Proprioception, and Posture for 25 minutes. The following activities were included:     Supine robots x 20   Side lying shoulder elevation with SA emphasis 2 x 10   Wall slides 3 x 12 with pillow case   Quadruped rock backs x 20 with 5" hold     Next session: landmine press    NP  Standing lower trap lift off wall x 20 with 3" holds   Inferior GH joint mobilization with 5 lb weight x 5'     Patient Education and Home Exercises     Home Exercises Provided and Patient Education Provided     Education provided:   - Patient was educated upon diagnosis, prognosis, and performance of her HEP.     Written Home Exercises Provided: yes. Exercises were reviewed and Surinder was able to demonstrate them prior to the end of the session.  Surinder demonstrated good  understanding of the education provided. See EMR under Patient Instructions for exercises provided during therapy sessions    ASSESSMENT     Surinder responded well to treatment which focused on improving right shoulder elevation. Quadruped rock back performed as knees were placed further back from shoulders allowing for increased right shoulder range of motion. She continued with RTC strengthening to improve GH positioning. Continue to progress activation of the scapula upward rotators.     Surinder Is progressing well towards her goals.     Pt prognosis is Excellent.     Pt will continue to benefit from skilled outpatient physical therapy to address the deficits listed in the problem list box on initial evaluation, provide pt/family education and to maximize pt's level of independence in the home and community environment.     Pt's spiritual, cultural and educational needs considered and pt agreeable to plan of care and goals.     Anticipated barriers to physical therapy: None    Goals:   Short " Term Goals: 0-5 weeks   Patient will be compliant with her HEP 75% of the prescribed amount.  Patient will demonstrate normal right latissimus dorsi and pectoralis minor length in 4-5 weeks.  Patient will improve her right shoulder overhead motion (flexion/abduction) equal to her left in 4-5 weeks.   Patient will improve her right lower and middle trap strength to at least 4-/5 in 5 weeks.   Patient will demonstrate normal scapulohumeral rhythm in 5 weeks.   Patient will improve her FOTO score to >75 in 5 weeks.      Long Term Goals: 5-10 weeks   1. Patient will be compliant with her % of the prescribed amount.   2. Patient will improve her right lower and middle trap strength to 4+/5 in 8 weeks.   3. Patient will improve her RTC strength to at least 4+/5 to allow for improved humeral head positioning in 8 weeks.   4. Patient will be able to box and perform all desired workouts at the gym without compensation in 10 weeks.    PLAN     Address postural deficits while seeking to improve patient's ability to activate her scapula upward rotators.     Constantine Lara, PT, DPT

## 2024-04-09 ENCOUNTER — CLINICAL SUPPORT (OUTPATIENT)
Dept: REHABILITATION | Facility: HOSPITAL | Age: 49
End: 2024-04-09
Payer: COMMERCIAL

## 2024-04-09 DIAGNOSIS — M25.611 DECREASED RIGHT SHOULDER RANGE OF MOTION: ICD-10-CM

## 2024-04-09 DIAGNOSIS — R29.898 DECREASED STRENGTH OF UPPER EXTREMITY: ICD-10-CM

## 2024-04-09 DIAGNOSIS — M25.511 CHRONIC RIGHT SHOULDER PAIN: Primary | ICD-10-CM

## 2024-04-09 DIAGNOSIS — G89.29 CHRONIC RIGHT SHOULDER PAIN: Primary | ICD-10-CM

## 2024-04-09 PROCEDURE — 97112 NEUROMUSCULAR REEDUCATION: CPT

## 2024-04-09 PROCEDURE — 97140 MANUAL THERAPY 1/> REGIONS: CPT

## 2024-04-09 PROCEDURE — 97110 THERAPEUTIC EXERCISES: CPT

## 2024-04-09 NOTE — PROGRESS NOTES
"[facility]   Physical Therapy Treatment Note     Name: Surinder Elizabeth  [facility] Number: [id number]    Therapy Diagnosis:   Encounter Diagnoses   Name Primary?    Chronic right shoulder pain Yes    Decreased right shoulder range of motion     Decreased strength of upper extremity      Physician: Order, Paper    Visit Date: 4/9/2024    Physician Orders: PT Eval and Treat   Medical Diagnosis from Referral: M25.611 (ICD-10-CM) - Stiffness of sternoclavicular joint, right  Evaluation Date: 3/18/2024  Authorization Period Expiration: 12/31/24  Plan of Care Expiration: 6/18/24  Progress Note Due: 4/29/24  Date of Surgery: None  Visit # / Visits authorized: 3/ 20 + eval    FOTO: 1/ 3     Precautions: Standard      Time In: 2:06 PM   Time Out: 3:06 PM   Total Billable Time: 30 minutes      SUBJECTIVE     Pt reports: her right shoulder pain remains deep within the joint. She states raising her arm in the air has improved.       She was compliant with home exercise program.  Response to previous treatment: Good   Functional change: improved overhead range of motion    Pain: 0/10  Location: Right superior shoulder    OBJECTIVE     4/9/24  R shoulder AROM   Flexion: 165 degrees  External Rotation at side: 65 degrees     Treatment       Surinder received the treatments listed below:      Therapeutic exercises to develop strength, endurance, ROM, flexibility, posture, and core stabilization for 18` minutes including:    UBE 3' forward /3' backward   ER (1 OTB)/IR (2 OTB) at 90 degrees frontal plane 3 x 10 (mild pain with ER)   LT lift off x 20 with 5" hold    Not today   LT phase I x 20 with 3" hold     Manual therapy techniques: Joint mobilizations, Manual traction, Myofacial release, Manual Lymphatic Drainage, Soft tissue Mobilization, and Friction Massage were applied to the: right shoulder for 10 minutes, including:    Posterior/inferior GH joint mobilizations   R shoulder PROM     Not today  SA kick out side " "lying 2 x 10 with 5" hold       Therapeutic activities to improve functional performance for 00 minutes, including:      Neuromuscular re-education activities to improve: Balance, Coordination, Kinesthetic, Sense, Proprioception, and Posture for 32 minutes. The following activities were included:     Supine robots x 25  Supine shoulder raise with 3 lb dumbbell (progress to standing)    Wall slides in pillow case 3 x 15   Quadruped rock backs x 20 with 5" hold   Inferior GH joint mobilization with 5 lb weight x 5'     Next session: landmine press    Patient Education and Home Exercises     Home Exercises Provided and Patient Education Provided     Education provided:   - Patient was educated upon diagnosis, prognosis, and performance of her HEP.     Written Home Exercises Provided: yes. Exercises were reviewed and Surinder was able to demonstrate them prior to the end of the session.  Surinder demonstrated good  understanding of the education provided. See EMR under Patient Instructions for exercises provided during therapy sessions    ASSESSMENT     Surinder presents with improved right shoulder range of motion compared to previous sessions. Mild impingement remains with shoulder abduction and scaption. Improvement in symptoms with posterior and inferior glenohumeral joint mobilizations. Continue to address lower trap activation to decrease shoulder impingement symptoms with overhead reaching. .     Surinder Is progressing well towards her goals.     Pt prognosis is Excellent.     Pt will continue to benefit from skilled outpatient physical therapy to address the deficits listed in the problem list box on initial evaluation, provide pt/family education and to maximize pt's level of independence in the home and community environment.     Pt's spiritual, cultural and educational needs considered and pt agreeable to plan of care and goals.     Anticipated barriers to physical therapy: None    Goals:   Short Term Goals: 0-5 weeks "   Patient will be compliant with her HEP 75% of the prescribed amount.  Patient will demonstrate normal right latissimus dorsi and pectoralis minor length in 4-5 weeks.  Patient will improve her right shoulder overhead motion (flexion/abduction) equal to her left in 4-5 weeks.   Patient will improve her right lower and middle trap strength to at least 4-/5 in 5 weeks.   Patient will demonstrate normal scapulohumeral rhythm in 5 weeks.   Patient will improve her FOTO score to >75 in 5 weeks.      Long Term Goals: 5-10 weeks   1. Patient will be compliant with her % of the prescribed amount.   2. Patient will improve her right lower and middle trap strength to 4+/5 in 8 weeks.   3. Patient will improve her RTC strength to at least 4+/5 to allow for improved humeral head positioning in 8 weeks.   4. Patient will be able to box and perform all desired workouts at the gym without compensation in 10 weeks.    PLAN     Address postural deficits while seeking to improve patient's ability to activate her scapula upward rotators.     Constantine Lara, PT, DPT

## 2024-04-18 ENCOUNTER — PATIENT MESSAGE (OUTPATIENT)
Dept: OTOLARYNGOLOGY | Facility: CLINIC | Age: 49
End: 2024-04-18
Payer: COMMERCIAL

## 2024-04-22 ENCOUNTER — OFFICE VISIT (OUTPATIENT)
Dept: OTOLARYNGOLOGY | Facility: CLINIC | Age: 49
End: 2024-04-22
Payer: COMMERCIAL

## 2024-04-22 VITALS
WEIGHT: 131.81 LBS | DIASTOLIC BLOOD PRESSURE: 69 MMHG | SYSTOLIC BLOOD PRESSURE: 119 MMHG | BODY MASS INDEX: 20.69 KG/M2 | HEART RATE: 65 BPM | HEIGHT: 67 IN

## 2024-04-22 DIAGNOSIS — J38.4 VOCAL FOLD EDEMA: ICD-10-CM

## 2024-04-22 DIAGNOSIS — R49.0 DYSPHONIA: ICD-10-CM

## 2024-04-22 DIAGNOSIS — J38.2 VOCAL CORD NODULE: Primary | ICD-10-CM

## 2024-04-22 PROCEDURE — 3078F DIAST BP <80 MM HG: CPT | Mod: CPTII,S$GLB,, | Performed by: NURSE PRACTITIONER

## 2024-04-22 PROCEDURE — 99999 PR PBB SHADOW E&M-EST. PATIENT-LVL III: CPT | Mod: PBBFAC,,, | Performed by: NURSE PRACTITIONER

## 2024-04-22 PROCEDURE — 1159F MED LIST DOCD IN RCRD: CPT | Mod: CPTII,S$GLB,, | Performed by: NURSE PRACTITIONER

## 2024-04-22 PROCEDURE — 1160F RVW MEDS BY RX/DR IN RCRD: CPT | Mod: CPTII,S$GLB,, | Performed by: NURSE PRACTITIONER

## 2024-04-22 PROCEDURE — 3074F SYST BP LT 130 MM HG: CPT | Mod: CPTII,S$GLB,, | Performed by: NURSE PRACTITIONER

## 2024-04-22 PROCEDURE — 31579 LARYNGOSCOPY TELESCOPIC: CPT | Mod: S$GLB,,, | Performed by: NURSE PRACTITIONER

## 2024-04-22 PROCEDURE — 99204 OFFICE O/P NEW MOD 45 MIN: CPT | Mod: 25,S$GLB,, | Performed by: NURSE PRACTITIONER

## 2024-04-22 PROCEDURE — 3008F BODY MASS INDEX DOCD: CPT | Mod: CPTII,S$GLB,, | Performed by: NURSE PRACTITIONER

## 2024-04-22 RX ORDER — PREDNISONE 20 MG/1
TABLET ORAL
Qty: 17 TABLET | Refills: 0 | Status: SHIPPED | OUTPATIENT
Start: 2024-04-22 | End: 2024-05-02

## 2024-04-22 RX ORDER — VALACYCLOVIR HYDROCHLORIDE 1 G/1
1 TABLET, FILM COATED ORAL 2 TIMES DAILY
COMMUNITY
Start: 2024-01-28

## 2024-04-22 NOTE — PROGRESS NOTES
Subjective     Patient ID: Surinder Elizabeth is a 48 y.o. female.    Chief Complaint: dysphonia    HPI  Surinder Elizabeth  is a 48 year old female who presents for dysphonia. She is a professional vocalist. 2 weeks ago her voice became rougher and raspy. She has difficulty singing in her normal range and is unable to singe falsetto. She is using steam and did some modified voice rest without improvement. She is also reporting some postnasal drainage. She has to cough a large amount of mucus in the morning. She has not noticed any reflux but is taking Prilosec in case therer is silent relfux. She has no throat pain or dificulty breathing. She has a known goiter, it is stable and being monitored with yearly US.    Past Medical History:   Diagnosis Date    Breast disorder     Fibroids.    Herpes simplex virus (HSV) infection     HSV2.       No past surgical history on file.      Current Outpatient Medications:     predniSONE (DELTASONE) 20 MG tablet, Take 2 tablets (40 mg total) by mouth once daily for 7 days, THEN 1.5 tablets (30 mg total) once daily for 1 day, THEN 1 tablet (20 mg total) once daily for 1 day, THEN 0.5 tablets (10 mg total) once daily for 1 day., Disp: 17 tablet, Rfl: 0    valACYclovir (VALTREX) 1000 MG tablet, Take 1 tablet by mouth 2 (two) times daily. (Patient not taking: Reported on 4/22/2024), Disp: , Rfl:     Review of patient's allergies indicates:  No Known Allergies    Social History     Socioeconomic History    Marital status: Single   Tobacco Use    Smoking status: Never    Smokeless tobacco: Never   Substance and Sexual Activity    Alcohol use: Yes     Comment: Socially.    Drug use: No    Sexual activity: Yes     Partners: Male     Social Determinants of Health     Financial Resource Strain: Low Risk  (4/22/2024)    Overall Financial Resource Strain (CARDIA)     Difficulty of Paying Living Expenses: Not very hard   Food Insecurity: No Food Insecurity (4/22/2024)    Hunger Vital Sign      Worried About Running Out of Food in the Last Year: Never true     Ran Out of Food in the Last Year: Never true   Transportation Needs: No Transportation Needs (4/22/2024)    PRAPARE - Transportation     Lack of Transportation (Medical): No     Lack of Transportation (Non-Medical): No   Physical Activity: Sufficiently Active (4/22/2024)    Exercise Vital Sign     Days of Exercise per Week: 4 days     Minutes of Exercise per Session: 60 min   Stress: Stress Concern Present (4/22/2024)    [employer] - Occupational Stress Questionnaire     Feeling of Stress : Very much   Social Connections: Unknown (4/22/2024)    Social Connection and Isolation Panel [NHANES]     Frequency of Communication with Friends and Family: Three times a week     Frequency of Social Gatherings with Friends and Family: Three times a week     Active Member of Clubs or Organizations: Yes     Attends Club or Organization Meetings: 1 to 4 times per year     Marital Status: Never    Housing Stability: Unknown (4/22/2024)    Housing Stability Vital Sign     Unable to Pay for Housing in the Last Year: No       Family History   Problem Relation Name Age of Onset    Hypertension Father      Prostate cancer Father  68    Breast cancer Neg Hx      Colon cancer Neg Hx      Ovarian cancer Neg Hx           Review of Systems   Constitutional: Negative.  Negative for appetite change, chills, diaphoresis, fatigue, fever and unexpected weight change.   HENT:  Positive for postnasal drip and voice change. Negative for nasal congestion, dental problem, drooling, ear discharge, ear pain, facial swelling, hearing loss, mouth sores, nosebleeds, rhinorrhea, sinus pressure/congestion, sneezing, sore throat, tinnitus and trouble swallowing.    Eyes: Negative.  Negative for pain, discharge, redness and itching.   Respiratory: Negative.  Negative for cough and shortness of breath.    Cardiovascular: Negative.  Negative  for chest pain.   Gastrointestinal: Negative.  Negative for abdominal distention, abdominal pain, diarrhea, nausea and vomiting.   Endocrine: Negative.  Negative for cold intolerance and heat intolerance.   Genitourinary: Negative.  Negative for difficulty urinating.   Musculoskeletal: Negative.  Negative for neck pain and neck stiffness.   Integumentary:  Negative for rash. Negative.   Allergic/Immunologic: Negative.    Neurological:  Positive for headaches. Negative for dizziness and weakness.   Hematological: Negative.  Negative for adenopathy.   Psychiatric/Behavioral:  Positive for sleep disturbance.           Objective     Physical Exam  Vitals reviewed.   Constitutional:       General: She is not in acute distress.     Appearance: She is well-developed. She is not ill-appearing or diaphoretic.   HENT:      Head: Normocephalic and atraumatic.      Jaw: No trismus.      Right Ear: Hearing and external ear normal.      Left Ear: Hearing and external ear normal.      Nose: Nose normal. No nasal deformity, mucosal edema or rhinorrhea.      Right Sinus: No maxillary sinus tenderness or frontal sinus tenderness.      Left Sinus: No maxillary sinus tenderness or frontal sinus tenderness.      Mouth/Throat:      Lips: Pink. No lesions.      Mouth: Mucous membranes are moist. Mucous membranes are not pale, not dry and not cyanotic. No oral lesions.      Dentition: Normal dentition. Does not have dentures. No dental caries.      Tongue: No lesions. Tongue does not deviate from midline.      Palate: No mass and lesions.      Pharynx: Uvula midline. No pharyngeal swelling, oropharyngeal exudate, posterior oropharyngeal erythema or uvula swelling.      Tonsils: No tonsillar abscesses.      Comments: Flexible Laryngeal Videostroboscopy (26707): Laryngeal videostroboscopy is indicated to assess laryngeal vibratory biomechanics and vocal fold oscillation, which cannot be assessed with a plain light examination. This was  carried out transnasally with a distal chip videoendoscope. After verbal consent was obtained, the patient was positioned and the nose was topically decongested with 1% phenylephrine and topically anesthetized with 4% lidocaine. The endoscope was passed through the most patent nasal cavity and positioned to image the nasopharynx, larynx, and hypopharynx in detail. The following features were examined: nasopharyngeal, laryngeal, hypopharyngeal masses; velopharyngeal strength, closure, and symmetry of motion; vocal fold range and symmetry of motion; laryngeal mucosal edema, erythema, inflammation, and hydration; salivary pooling; and gross laryngeal sensation. During phonation, the vocal folds were assessed for glottal closure; mucosal wave; vocal fold lesions; vibratory periodicity, amplitude, and phase symmetry; and vertical height match. The equipment was removed. The patient tolerated the procedure well without complication. All findings were normal except:  - there is vocal fold swelling along free edge causing irregular closure  -small bilateral pale soft nodules  -scattered prominent vessels to bilateral vocal folds      Eyes:      General: No scleral icterus.        Right eye: No discharge.         Left eye: No discharge.      Conjunctiva/sclera: Conjunctivae normal.   Neck:      Thyroid: No thyroid mass or thyromegaly.      Vascular: No JVD.      Trachea: Trachea and phonation normal. No tracheal tenderness or tracheal deviation.      Comments: Salivary glands - there are no lesions or asymmetric findings in the submandibular or parotid glands    Cardiovascular:      Rate and Rhythm: Normal rate.   Pulmonary:      Effort: Pulmonary effort is normal. No respiratory distress.      Breath sounds: No stridor.   Musculoskeletal:      Cervical back: Normal range of motion and neck supple.   Lymphadenopathy:      Head:      Right side of head: No submental, submandibular, tonsillar or preauricular adenopathy.       Left side of head: No submental, submandibular, tonsillar or preauricular adenopathy.      Cervical: No cervical adenopathy.   Skin:     General: Skin is warm and dry.      Coloration: Skin is not pale.      Findings: No erythema or rash.   Neurological:      Mental Status: She is alert and oriented to person, place, and time.      Cranial Nerves: No cranial nerve deficit.   Psychiatric:         Behavior: Behavior normal. Behavior is cooperative.         Thought Content: Thought content normal.        Assessment and Plan     1. Vocal cord nodule  -     Ambulatory referral/consult to Speech Therapy; Future; Expected date: 04/29/2024    2. Dysphonia  -     Ambulatory referral/consult to Speech Therapy; Future; Expected date: 04/29/2024    3. Vocal fold edema  Assessment & Plan:  Likely related to URI + heavy voice use. Reccomeded modified voice rest and continued vocal hygiene. Steroids prescribed. Voice therapy recommended to restore voice. She will follow up with Dr. Garvin. Voice therapy referral also placed.      Other orders  -     predniSONE (DELTASONE) 20 MG tablet; Take 2 tablets (40 mg total) by mouth once daily for 7 days, THEN 1.5 tablets (30 mg total) once daily for 1 day, THEN 1 tablet (20 mg total) once daily for 1 day, THEN 0.5 tablets (10 mg total) once daily for 1 day.  Dispense: 17 tablet; Refill: 0               No follow-ups on file.

## 2024-04-22 NOTE — ASSESSMENT & PLAN NOTE
Likely related to URI + heavy voice use. Reccomeded modified voice rest and continued vocal hygiene. Steroids prescribed. Voice therapy recommended to restore voice. She will follow up with Dr. Garvin. Voice therapy referral also placed.

## 2024-05-07 ENCOUNTER — CLINICAL SUPPORT (OUTPATIENT)
Dept: SPEECH THERAPY | Facility: HOSPITAL | Age: 49
End: 2024-05-07
Payer: COMMERCIAL

## 2024-05-07 DIAGNOSIS — R49.0 DYSPHONIA: ICD-10-CM

## 2024-05-07 DIAGNOSIS — J38.2 VOCAL CORD NODULE: Primary | ICD-10-CM

## 2024-05-07 DIAGNOSIS — J38.2 VOCAL CORD NODULE: ICD-10-CM

## 2024-05-07 PROCEDURE — 92524 BEHAVRAL QUALIT ANALYS VOICE: CPT | Performed by: SPEECH-LANGUAGE PATHOLOGIST

## 2024-05-07 NOTE — PLAN OF CARE
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  Prognosis for continued improvement is good.     Recommendations / POC    -Recommend 2-4 sessions of voice therapy over 3-8 weeks with a speech-language pathologist to optimize glottal postures for improved vocal function, vocal efficiency, and ease of phonation  -Continue exercises as discussed in session  -Contact clinician with any further questions     Functional goals  Length Status Goal   Long term Initiated  Patient will implement and adhere to vocal hygiene protocols on a daily basis, including the elimination of phonotraumatic behaviors.    Long term Initiated  Patient and clinician will facilitate changes in vocal function in order to restore functional use of voice for daily occupational, social, and emotional demands.    Long term Initiated  Patient will re-establish phonation with adequate balance of airflow and resonance with decreased muscle tension.    Long term Initiated   Patient will improve coordination of respiration and phonation for efficient vocal production at a conversational level.    Short term Initiated  Patient will complete SOVT exercises and/or resonant-focused exercises 3-5x daily to strengthen and balance the intrinsic laryngeal musculature and maximize glottic closure without medial hyperfunction.   Short term Initiated  Patient will improve the quality, efficiency, and ease of phonation through resonant and/or airflow exercises from the syllable to conversation level with 80% accuracy.   Short term Initiated   Patient will increase awareness for voice conservations behaviors by following the 50/10 rule and reduce voice use in competing noise environments.    Short term Initiated  Patient will demonstrate the ability to increase awareness of voicing behavior through self-monitoring to facilitate generalization in functional speaking situations with 80%  accuracy.

## 2024-05-07 NOTE — PROGRESS NOTES
Referring provider: Blanche Restrepo  Reason for visit:  Behavioral and qualitative analysis of voice and resonance (CPT 68845)    Subjective / History    Surinder Elizabeth is a 48 y.o. female referred for voice evaluation (CPT 70736) by CHITO Restrepo.  She presents with complaints of hoarseness, difficulty with upper register which began a few weeks ago.  The patient also reported the following complaints:  voice worse in afternoon/evening, fatigue with use, changes in modal pitch, difficulty with singing, and reduced volume.  She was evaluated by Roslyn Restrepo on 24.  Due to some vocal fold swelling she was placed on a short course of steroids.  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  She has had several gigs since them.  Most of them have gone pretty well and she feels her falsetto is coming back.  She has rested her voice as much as possible, and has been steaming and drinking a lot of water.  Also notes talking has felt better since finishing the steroids.      Swallowing: no c/o    Breathing:  no c/o     Smokin packs/day   Reflux: yes  Water: plenty    Stroboscopy findings (per CHITO Restrepo on 24)  - there is vocal fold swelling along free edge causing irregular closure  -small bilateral pale soft nodules  -scattered prominent vessels to bilateral vocal folds     Past Medical History:   Diagnosis Date    Breast disorder     Fibroids.    Herpes simplex virus (HSV) infection     HSV2.     Current Outpatient Medications on File Prior to Visit   Medication Sig Dispense Refill    valACYclovir (VALTREX) 1000 MG tablet Take 1 tablet by mouth 2 (two) times daily. (Patient not taking: Reported on 2024)       No current facility-administered medications on file prior to visit.       Objective    Perceptual/behavioral assessment  -CAPE-V Overall Score: 7  -Quality: mild intermittent roughness  -Volume: appropriate for age and gender  -Pitch: appropriate for age and gender  identity  -Flexibility: appropriate for age and gender norms  -Habitual respiratory pattern: chest/clavicular    Education / Stimulability Trials  Discussed importance of vocal hygiene including: hydration, conservation, and reducing throat clearing, coughing, other phonotraumatic behaviors.  Discussed use of alginate therapy to reduce nighttime coughing.  Patient was stimulable for improved voice using SOVT exercises.  She was instructed on SOVT straw phonation in isolation and into connected speech and singing.  Encouraged practicing exercises several times daily in isolation and into short phrases to solidify muscle memory patterns and reduce extralaryngeal tension during speech tasks.  She was amenable to all suggestions.     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  Prognosis for continued improvement is good.     Recommendations / POC    -Recommend 2-4 sessions of voice therapy over 3-8 weeks with a speech-language pathologist to optimize glottal postures for improved vocal function, vocal efficiency, and ease of phonation  -Continue exercises as discussed in session  -Contact clinician with any further questions     Functional goals  Length Status Goal   Long term Initiated  Patient will implement and adhere to vocal hygiene protocols on a daily basis, including the elimination of phonotraumatic behaviors.    Long term Initiated  Patient and clinician will facilitate changes in vocal function in order to restore functional use of voice for daily occupational, social, and emotional demands.    Long term Initiated  Patient will re-establish phonation with adequate balance of airflow and resonance with decreased muscle tension.    Long term Initiated   Patient will improve coordination of respiration and phonation for efficient vocal production at a conversational level.    Short term Initiated  Patient will complete SOVT  exercises and/or resonant-focused exercises 3-5x daily to strengthen and balance the intrinsic laryngeal musculature and maximize glottic closure without medial hyperfunction.   Short term Initiated  Patient will improve the quality, efficiency, and ease of phonation through resonant and/or airflow exercises from the syllable to conversation level with 80% accuracy.   Short term Initiated   Patient will increase awareness for voice conservations behaviors by following the 50/10 rule and reduce voice use in competing noise environments.    Short term Initiated  Patient will demonstrate the ability to increase awareness of voicing behavior through self-monitoring to facilitate generalization in functional speaking situations with 80% accuracy.

## 2024-05-21 ENCOUNTER — LAB VISIT (OUTPATIENT)
Dept: LAB | Facility: OTHER | Age: 49
End: 2024-05-21
Attending: INTERNAL MEDICINE
Payer: COMMERCIAL

## 2024-05-21 ENCOUNTER — OFFICE VISIT (OUTPATIENT)
Dept: HEMATOLOGY/ONCOLOGY | Facility: CLINIC | Age: 49
End: 2024-05-21
Payer: COMMERCIAL

## 2024-05-21 ENCOUNTER — PATIENT MESSAGE (OUTPATIENT)
Dept: HEMATOLOGY/ONCOLOGY | Facility: CLINIC | Age: 49
End: 2024-05-21

## 2024-05-21 VITALS
DIASTOLIC BLOOD PRESSURE: 64 MMHG | TEMPERATURE: 99 F | HEART RATE: 66 BPM | OXYGEN SATURATION: 100 % | SYSTOLIC BLOOD PRESSURE: 113 MMHG | WEIGHT: 130.06 LBS | HEIGHT: 68 IN | BODY MASS INDEX: 19.71 KG/M2 | RESPIRATION RATE: 12 BRPM

## 2024-05-21 DIAGNOSIS — R53.83 FATIGUE, UNSPECIFIED TYPE: ICD-10-CM

## 2024-05-21 DIAGNOSIS — D50.0 IRON DEFICIENCY ANEMIA DUE TO CHRONIC BLOOD LOSS: Primary | ICD-10-CM

## 2024-05-21 DIAGNOSIS — D50.0 IRON DEFICIENCY ANEMIA DUE TO CHRONIC BLOOD LOSS: ICD-10-CM

## 2024-05-21 DIAGNOSIS — D70.8 OTHER NEUTROPENIA: ICD-10-CM

## 2024-05-21 LAB
ALBUMIN SERPL BCP-MCNC: 3.8 G/DL (ref 3.5–5.2)
ALP SERPL-CCNC: 69 U/L (ref 55–135)
ALT SERPL W/O P-5'-P-CCNC: 10 U/L (ref 10–44)
ANION GAP SERPL CALC-SCNC: 10 MMOL/L (ref 8–16)
AST SERPL-CCNC: 12 U/L (ref 10–40)
BASOPHILS # BLD AUTO: 0.02 K/UL (ref 0–0.2)
BASOPHILS NFR BLD: 0.8 % (ref 0–1.9)
BILIRUB SERPL-MCNC: 0.3 MG/DL (ref 0.1–1)
BUN SERPL-MCNC: 8 MG/DL (ref 6–20)
CALCIUM SERPL-MCNC: 9 MG/DL (ref 8.7–10.5)
CHLORIDE SERPL-SCNC: 107 MMOL/L (ref 95–110)
CO2 SERPL-SCNC: 22 MMOL/L (ref 23–29)
CREAT SERPL-MCNC: 0.7 MG/DL (ref 0.5–1.4)
DIFFERENTIAL METHOD BLD: ABNORMAL
EOSINOPHIL # BLD AUTO: 0 K/UL (ref 0–0.5)
EOSINOPHIL NFR BLD: 1.5 % (ref 0–8)
ERYTHROCYTE [DISTWIDTH] IN BLOOD BY AUTOMATED COUNT: 15.9 % (ref 11.5–14.5)
EST. GFR  (NO RACE VARIABLE): >60 ML/MIN/1.73 M^2
FERRITIN SERPL-MCNC: 5 NG/ML (ref 20–300)
FOLATE SERPL-MCNC: 9.8 NG/ML (ref 4–24)
GLUCOSE SERPL-MCNC: 90 MG/DL (ref 70–110)
HCT VFR BLD AUTO: 27.8 % (ref 37–48.5)
HGB BLD-MCNC: 7.5 G/DL (ref 12–16)
IMM GRANULOCYTES # BLD AUTO: 0 K/UL (ref 0–0.04)
IMM GRANULOCYTES NFR BLD AUTO: 0 % (ref 0–0.5)
IRON SERPL-MCNC: 10 UG/DL (ref 30–160)
LYMPHOCYTES # BLD AUTO: 0.9 K/UL (ref 1–4.8)
LYMPHOCYTES NFR BLD: 35.4 % (ref 18–48)
MCH RBC QN AUTO: 18.9 PG (ref 27–31)
MCHC RBC AUTO-ENTMCNC: 27 G/DL (ref 32–36)
MCV RBC AUTO: 70 FL (ref 82–98)
MONOCYTES # BLD AUTO: 0.4 K/UL (ref 0.3–1)
MONOCYTES NFR BLD: 16 % (ref 4–15)
NEUTROPHILS # BLD AUTO: 1.2 K/UL (ref 1.8–7.7)
NEUTROPHILS NFR BLD: 46.3 % (ref 38–73)
NRBC BLD-RTO: 0 /100 WBC
PLATELET # BLD AUTO: 378 K/UL (ref 150–450)
PMV BLD AUTO: 9 FL (ref 9.2–12.9)
POTASSIUM SERPL-SCNC: 4.2 MMOL/L (ref 3.5–5.1)
PROT SERPL-MCNC: 6.9 G/DL (ref 6–8.4)
RBC # BLD AUTO: 3.96 M/UL (ref 4–5.4)
SATURATED IRON: 2 % (ref 20–50)
SODIUM SERPL-SCNC: 139 MMOL/L (ref 136–145)
TOTAL IRON BINDING CAPACITY: 565 UG/DL (ref 250–450)
TRANSFERRIN SERPL-MCNC: 382 MG/DL (ref 200–375)
TSH SERPL DL<=0.005 MIU/L-ACNC: 0.56 UIU/ML (ref 0.4–4)
VIT B12 SERPL-MCNC: 853 PG/ML (ref 210–950)
WBC # BLD AUTO: 2.63 K/UL (ref 3.9–12.7)

## 2024-05-21 PROCEDURE — 36415 COLL VENOUS BLD VENIPUNCTURE: CPT | Performed by: INTERNAL MEDICINE

## 2024-05-21 PROCEDURE — 82728 ASSAY OF FERRITIN: CPT | Performed by: INTERNAL MEDICINE

## 2024-05-21 PROCEDURE — 1159F MED LIST DOCD IN RCRD: CPT | Mod: CPTII,S$GLB,, | Performed by: INTERNAL MEDICINE

## 2024-05-21 PROCEDURE — 85025 COMPLETE CBC W/AUTO DIFF WBC: CPT | Performed by: INTERNAL MEDICINE

## 2024-05-21 PROCEDURE — 84443 ASSAY THYROID STIM HORMONE: CPT | Performed by: INTERNAL MEDICINE

## 2024-05-21 PROCEDURE — 3074F SYST BP LT 130 MM HG: CPT | Mod: CPTII,S$GLB,, | Performed by: INTERNAL MEDICINE

## 2024-05-21 PROCEDURE — 83540 ASSAY OF IRON: CPT | Performed by: INTERNAL MEDICINE

## 2024-05-21 PROCEDURE — 1160F RVW MEDS BY RX/DR IN RCRD: CPT | Mod: CPTII,S$GLB,, | Performed by: INTERNAL MEDICINE

## 2024-05-21 PROCEDURE — 80053 COMPREHEN METABOLIC PANEL: CPT | Performed by: INTERNAL MEDICINE

## 2024-05-21 PROCEDURE — 99205 OFFICE O/P NEW HI 60 MIN: CPT | Mod: S$GLB,,, | Performed by: INTERNAL MEDICINE

## 2024-05-21 PROCEDURE — 3078F DIAST BP <80 MM HG: CPT | Mod: CPTII,S$GLB,, | Performed by: INTERNAL MEDICINE

## 2024-05-21 PROCEDURE — 99999 PR PBB SHADOW E&M-EST. PATIENT-LVL III: CPT | Mod: PBBFAC,,, | Performed by: INTERNAL MEDICINE

## 2024-05-21 PROCEDURE — 83921 ORGANIC ACID SINGLE QUANT: CPT | Performed by: INTERNAL MEDICINE

## 2024-05-21 PROCEDURE — 82746 ASSAY OF FOLIC ACID SERUM: CPT | Performed by: INTERNAL MEDICINE

## 2024-05-21 PROCEDURE — 82607 VITAMIN B-12: CPT | Performed by: INTERNAL MEDICINE

## 2024-05-21 PROCEDURE — 3008F BODY MASS INDEX DOCD: CPT | Mod: CPTII,S$GLB,, | Performed by: INTERNAL MEDICINE

## 2024-05-21 NOTE — Clinical Note
Ganesh Juan, I met Ms. Elizabeth in heme clinic yesterday for IVIS presumably from [facility], she last received iv iron in August 2023, her hgb is now 7.5. I was hoping to get her plugged back in with your team to discuss management of her fibroids.  I'll get IV iron for her asap. Thanks! Tamera

## 2024-05-21 NOTE — PROGRESS NOTES
[facility]     Outpatient Hematology/Medical Oncology Note  Patient: Surinder Elizabeth  MRN: [id number]  Primary Care Provider: No, Primary Doctor  Chief Complaint:  Iron Deficiency Anemia     Subjective     Subjective:   HPI:   Surinder Elizabeth is a 48 y.o. female with PMH significant for:   - Benign Ethnic Neutropenia    She is followed by Hematology for known iron deficiency anemia presumably from HMB. She was previously followed by Dr. De La Cruz and presents today to transfer care.     HPI:  2021 - 2023: increase in baseline HMB; Hb range: 6.4-8.4  3/16/23: TVUS: fibroid uterus - 3.1 cm subcuosal, 5.1 subserosal, 3.1 and 3 cm intramural fibroids   -Unable to tolerate PO iron due to constipation   -IV iron: Injectafer (8/10/2023) with improvement in Hb to 13.2 (10/2023)  Prior labs: 2023 - folate: 15.9, b12: 795, HIV and hepatitis testing : negative, peripheral smear: mild anisopoikilocytosis, absolute neutropenia, copper: 1692    Interval History:    Mrs. Elizabeth overall feels well. Patient's current symptoms are:  (1) Fatigue: new over the last few months  (2) HMB: Her periods occur q 30 days, last about 5 days, wears a menstrual disc and changes it q 4 hrs. No significant cramping. No other sites of blood loss, or issues with abnormal bleeding. She is not taking oral iron due to associated significant constipation.    Patient otherwise denies fevers, chills, night sweats, headaches, chest pain, SOB, cough, abdominal pain, N/V, diarrhea, constipation, weight loss, rashes     Review of Systems:  14-point review of systems was asked with the pertinent positives and/or negatives stated in HPI.     Past Medical History:   - COLTON, Barthlolin's Cyst (2024)     Past Surgical HIstory:   History reviewed. No pertinent surgical history.    Family History:   - Father: Prostate Cancer (70s, early stage)  Family History   Problem Relation Name Age of Onset    Hypertension Father      Prostate cancer Father  68    Breast cancer Neg  "Hx      Colon cancer Neg Hx      Ovarian cancer Neg Hx       Social History:    reports that she has never smoked. She has never used smokeless tobacco. She reports current alcohol use. She reports that she does not use drugs.  - Occupation: [employer]    Allergies:  Review of patient's allergies indicates:  No Known Allergies    Medications:  Current Outpatient Medications   Medication Sig Dispense Refill    valACYclovir (VALTREX) 1000 MG tablet Take 1 tablet by mouth 2 (two) times daily.       No current facility-administered medications for this visit.          Objective:   Vitals:   Vitals:    05/21/24 1430   BP: 113/64   BP Location: Left arm   Patient Position: Sitting   BP Method: Small (Automatic)   Pulse: 66   Resp: 12   Temp: 98.8 °F (37.1 °C)   TempSrc: Oral   SpO2: 100%   Weight: 59 kg (130 lb 1.1 oz)   Height: 5' 7.5" (1.715 m)     BMI: Body mass index is 20.07 kg/m².    Physical Exam  ECOG Performance Status:    General Appearance:    Alert, cooperative, no distress, appears stated age   Head:    Normocephalic, without obvious abnormality, atraumatic   Throat:   Lips, mucosa, and tongue normal; teeth and gums normal   Neck:   Supple, symmetrical, no adenopathy;     thyroid:  no enlargement/tenderness/nodules   Lungs:     Clear to auscultation bilaterally, respirations unlabored    Heart:    Regular rate and rhythm, S1 and S2 normal   Abdomen:     Soft, non-tender, bowel sounds active, no masses, no organomegaly   Extremities:   Extremities normal, atraumatic, no cyanosis or edema   Pulses:   2+ and symmetric all extremities   Skin:   Skin color, texture, turgor normal, no rashes or lesions   Lymph nodes:   Cervical, supraclavicular, and axillary nodes normal   Neurologic:   CNII-XII intact, normal strength, sensation     Laboratory Data:  Lab Visit on 05/21/2024   Component Date Value Ref Range Status    WBC 05/21/2024 2.63 (L)  3.90 - 12.70 K/uL Final    RBC 05/21/2024 3.96 (L)  4.00 - 5.40 M/uL Final    " Hemoglobin 05/21/2024 7.5 (L)  12.0 - 16.0 g/dL Final    Hematocrit 05/21/2024 27.8 (L)  37.0 - 48.5 % Final    MCV 05/21/2024 70 (L)  82 - 98 fL Final    MCH 05/21/2024 18.9 (L)  27.0 - 31.0 pg Final    MCHC 05/21/2024 27.0 (L)  32.0 - 36.0 g/dL Final    RDW 05/21/2024 15.9 (H)  11.5 - 14.5 % Final    Platelets 05/21/2024 378  150 - 450 K/uL Final    MPV 05/21/2024 9.0 (L)  9.2 - 12.9 fL Final    Immature Granulocytes 05/21/2024 0.0  0.0 - 0.5 % Final    Gran # (ANC) 05/21/2024 1.2 (L)  1.8 - 7.7 K/uL Final    Immature Grans (Abs) 05/21/2024 0.00  0.00 - 0.04 K/uL Final    Comment: Mild elevation in immature granulocytes is non specific and   can be seen in a variety of conditions including stress response,   acute inflammation, trauma and pregnancy. Correlation with other   laboratory and clinical findings is essential.      Lymph # 05/21/2024 0.9 (L)  1.0 - 4.8 K/uL Final    Mono # 05/21/2024 0.4  0.3 - 1.0 K/uL Final    Eos # 05/21/2024 0.0  0.0 - 0.5 K/uL Final    Baso # 05/21/2024 0.02  0.00 - 0.20 K/uL Final    nRBC 05/21/2024 0  0 /100 WBC Final    Gran % 05/21/2024 46.3  38.0 - 73.0 % Final    Lymph % 05/21/2024 35.4  18.0 - 48.0 % Final    Mono % 05/21/2024 16.0 (H)  4.0 - 15.0 % Final    Eosinophil % 05/21/2024 1.5  0.0 - 8.0 % Final    Basophil % 05/21/2024 0.8  0.0 - 1.9 % Final    Differential Method 05/21/2024 Automated   Final    Sodium 05/21/2024 139  136 - 145 mmol/L Final    Potassium 05/21/2024 4.2  3.5 - 5.1 mmol/L Final    Chloride 05/21/2024 107  95 - 110 mmol/L Final    CO2 05/21/2024 22 (L)  23 - 29 mmol/L Final    Glucose 05/21/2024 90  70 - 110 mg/dL Final    BUN 05/21/2024 8  6 - 20 mg/dL Final    Creatinine 05/21/2024 0.7  0.5 - 1.4 mg/dL Final    Calcium 05/21/2024 9.0  8.7 - 10.5 mg/dL Final    Total Protein 05/21/2024 6.9  6.0 - 8.4 g/dL Final    Albumin 05/21/2024 3.8  3.5 - 5.2 g/dL Final    Total Bilirubin 05/21/2024 0.3  0.1 - 1.0 mg/dL Final    Comment: For infants and newborns,  interpretation of results should be based  on gestational age, weight and in agreement with clinical  observations.    Premature Infant recommended reference ranges:  Up to 24 hours.............<8.0 mg/dL  Up to 48 hours............<12.0 mg/dL  3-5 days..................<15.0 mg/dL  6-29 days.................<15.0 mg/dL      Alkaline Phosphatase 05/21/2024 69  55 - 135 U/L Final    AST 05/21/2024 12  10 - 40 U/L Final    ALT 05/21/2024 10  10 - 44 U/L Final    eGFR 05/21/2024 >60  >60 mL/min/1.73 m^2 Final    Anion Gap 05/21/2024 10  8 - 16 mmol/L Final    Iron 05/21/2024 10 (L)  30 - 160 ug/dL Final    Transferrin 05/21/2024 382 (H)  200 - 375 mg/dL Final    TIBC 05/21/2024 565 (H)  250 - 450 ug/dL Final    Saturated Iron 05/21/2024 2 (L)  20 - 50 % Final    Ferritin 05/21/2024 5 (L)  20.0 - 300.0 ng/mL Final    Vitamin B-12 05/21/2024 853  210 - 950 pg/mL Final    Folate 05/21/2024 9.8  4.0 - 24.0 ng/mL Final    TSH 05/21/2024 0.555  0.400 - 4.000 uIU/mL Final       Imaging: none recent     Assessment   Assessment and Plan:   Surinder Elizabeth is a 48 y.o. female with Benign Ethnic Neutropenia. She is followed by Hematology for known iron deficiency anemia presumably from HMB. She was previously followed by Dr. De La Cruz and presents today to transfer care.     # Iron Deficiency Anemia   - Last labs: 10/2023 - WBC: 2970, ANC: 1300, Hb: 13, MCV: 85, platelets: 348 , TSAT: 4%, ferritin: 5     - Prior labs indicate iron deficiency with ferritin <30 and TSAT <20% with last Hb of 13 in 10/2023 after IV iron in 8/2023  - The likely cause of her iron deficiency is from blood loss from menstruation; last colonoscopy reportedly negative as below  - Repeat labs today     PLAN:   - If Hgb <10, recommend IV iron infusion. Blood transfusion for Hb <7. ED precautions discussed.   - Resume PO iron (ferrous sulfate 65 mg PO MWF) if tolerated; add laxative PRN constipation   - Avoid NSAIDs   - F/u gynecology for AUB     # Other  Co-Morbidities  - AUB: known fibroids, encouraged gyn follow up    - Neutropenia: presumably COLTON ie normal variant, folate: 15.9, b12: 795, HIV and hepatitis testing : negative, peripheral smear: mild anisopoikilocytosis, absolute neutropenia, copper: 1692 - CTM  - Cancer Screening: Colonoscopy: 2023 (1 cm rectal hyperplastic polyp) - performed outside of [facility], Pap:  (hpv: neg, pap: wnl), MM2024 - encouraged PCP f/u    Follow Up:  - Labs today - follow up pending results and need for IV iron (repeat hb electrophoresis once iron deficiency corrected)  - ADDENDUM:  BC: Hb: 7.5, MCV: 70, platelets: 378, WBC: 2630, ANC: 1200, CMP: wnl, TSAT: 2%, Ferritin: 5 - request IV Iron ASAP, RTC in 8 weeks from infusion to assess response     # Treatment Plan  - Medication: IV Injectafer  - Dose: 750 mg IV once and second dose of 750 mg IV once after 7 days  - Monitoring: Baseline CBC/Iron Profile/Ferritin and CBC/Ferritin in 4 to 8 weeks post infusion to assess response   - Side effects reviewed. Discussed IV iron is typically overall well tolerated but risks include and are not limited to hypersensitivity rxns, hypotension, GI toxicity, HA, myalgias, multisystem organ failure, infection, life threatening side effects including shock. Risks, benefits, alternatives discussed. No further questions. Patient elects to proceed    MDM includes:    - Acute or chronic illness or injury that poses a threat to life or bodily function  - Review of prior external notes from unique source  - Independent review and explanation of 3+ results from unique tests   - Discussion of management and ordering 3+ unique tests   - Extensive discussion of treatment and management  - Prescription drug management  - Drug therapy requiring intensive monitoring for toxicity    - Overall, I discussed the diagnosis, history, stage, labs/imaging, prognosis, management, and treatment plan as applicable. I reviewed adverse short and long term effects  as applicable.   - Informed patient if symptoms are getting worse that it is their responsibility to call the clinic and schedule follow up sooner than stated follow up. Also informed patient if they do not hear from the appointment center in 2-5 business days for their referrals, the patient must call the Oncology clinic so we can follow up on procedures or referral scheduling. Patient was fully informed of current medical plan, all questions were answered and patient verbalized understanding. No further questions.   - Face to Face Visit time I spent with the patient: 60 minutes of total time spent on the encounter, including counseling patient and/or coordinating care, which includes face to face time and non-face to face time preparing to see the patient (eg, review of tests), Obtaining and/or reviewing separately obtained history, Documenting clinical information in the electronic or other health record, Independently interpreting results (not separately reported) and communicating results to the patient/family/caregiver, or Care coordination (not separately reported).     Signed   Beatrice Lester MD  [facility]

## 2024-05-22 RX ORDER — DIPHENHYDRAMINE HYDROCHLORIDE 50 MG/ML
50 INJECTION INTRAMUSCULAR; INTRAVENOUS ONCE AS NEEDED
Status: CANCELLED | OUTPATIENT
Start: 2024-05-22

## 2024-05-22 RX ORDER — HEPARIN 100 UNIT/ML
500 SYRINGE INTRAVENOUS
Status: CANCELLED | OUTPATIENT
Start: 2024-05-22

## 2024-05-22 RX ORDER — SODIUM CHLORIDE 0.9 % (FLUSH) 0.9 %
10 SYRINGE (ML) INJECTION
Status: CANCELLED | OUTPATIENT
Start: 2024-05-22

## 2024-05-22 RX ORDER — SODIUM CHLORIDE 0.9 % (FLUSH) 0.9 %
10 SYRINGE (ML) INJECTION
Status: CANCELLED | OUTPATIENT
Start: 2024-06-05

## 2024-05-22 RX ORDER — DIPHENHYDRAMINE HYDROCHLORIDE 50 MG/ML
50 INJECTION INTRAMUSCULAR; INTRAVENOUS ONCE AS NEEDED
Status: CANCELLED | OUTPATIENT
Start: 2024-06-05

## 2024-05-22 RX ORDER — HEPARIN 100 UNIT/ML
500 SYRINGE INTRAVENOUS
Status: CANCELLED | OUTPATIENT
Start: 2024-06-05

## 2024-05-22 RX ORDER — EPINEPHRINE 0.3 MG/.3ML
0.3 INJECTION SUBCUTANEOUS ONCE AS NEEDED
Status: CANCELLED | OUTPATIENT
Start: 2024-06-05

## 2024-05-22 RX ORDER — EPINEPHRINE 0.3 MG/.3ML
0.3 INJECTION SUBCUTANEOUS ONCE AS NEEDED
Status: CANCELLED | OUTPATIENT
Start: 2024-05-22

## 2024-05-25 LAB — METHYLMALONATE SERPL-SCNC: <0.1 UMOL/L

## 2024-05-30 ENCOUNTER — TELEPHONE (OUTPATIENT)
Dept: INFUSION THERAPY | Facility: OTHER | Age: 49
End: 2024-05-30
Payer: COMMERCIAL

## 2024-06-04 ENCOUNTER — INFUSION (OUTPATIENT)
Dept: INFUSION THERAPY | Facility: OTHER | Age: 49
End: 2024-06-04
Attending: INTERNAL MEDICINE
Payer: COMMERCIAL

## 2024-06-04 ENCOUNTER — CLINICAL SUPPORT (OUTPATIENT)
Dept: SPEECH THERAPY | Facility: HOSPITAL | Age: 49
End: 2024-06-04
Payer: COMMERCIAL

## 2024-06-04 VITALS
OXYGEN SATURATION: 99 % | SYSTOLIC BLOOD PRESSURE: 107 MMHG | RESPIRATION RATE: 16 BRPM | HEART RATE: 75 BPM | DIASTOLIC BLOOD PRESSURE: 53 MMHG

## 2024-06-04 DIAGNOSIS — R49.0 DYSPHONIA: ICD-10-CM

## 2024-06-04 DIAGNOSIS — J38.2 VOCAL CORD NODULE: ICD-10-CM

## 2024-06-04 DIAGNOSIS — D50.0 IRON DEFICIENCY ANEMIA DUE TO CHRONIC BLOOD LOSS: Primary | ICD-10-CM

## 2024-06-04 PROCEDURE — 92507 TX SP LANG VOICE COMM INDIV: CPT | Mod: GN | Performed by: SPEECH-LANGUAGE PATHOLOGIST

## 2024-06-04 PROCEDURE — 96365 THER/PROPH/DIAG IV INF INIT: CPT

## 2024-06-04 PROCEDURE — 25000003 PHARM REV CODE 250: Performed by: INTERNAL MEDICINE

## 2024-06-04 RX ORDER — DIPHENHYDRAMINE HYDROCHLORIDE 50 MG/ML
50 INJECTION INTRAMUSCULAR; INTRAVENOUS ONCE AS NEEDED
OUTPATIENT
Start: 2024-06-04

## 2024-06-04 RX ORDER — EPINEPHRINE 0.3 MG/.3ML
0.3 INJECTION SUBCUTANEOUS ONCE AS NEEDED
OUTPATIENT
Start: 2024-06-04

## 2024-06-04 RX ORDER — SODIUM CHLORIDE 0.9 % (FLUSH) 0.9 %
10 SYRINGE (ML) INJECTION
Status: DISCONTINUED | OUTPATIENT
Start: 2024-06-04 | End: 2024-06-04 | Stop reason: HOSPADM

## 2024-06-04 RX ORDER — EPINEPHRINE 0.3 MG/.3ML
0.3 INJECTION SUBCUTANEOUS ONCE AS NEEDED
Status: DISCONTINUED | OUTPATIENT
Start: 2024-06-04 | End: 2024-06-04 | Stop reason: HOSPADM

## 2024-06-04 RX ORDER — DIPHENHYDRAMINE HYDROCHLORIDE 50 MG/ML
50 INJECTION INTRAMUSCULAR; INTRAVENOUS ONCE AS NEEDED
Status: DISCONTINUED | OUTPATIENT
Start: 2024-06-04 | End: 2024-06-04 | Stop reason: HOSPADM

## 2024-06-04 RX ORDER — HEPARIN 100 UNIT/ML
500 SYRINGE INTRAVENOUS
Status: DISCONTINUED | OUTPATIENT
Start: 2024-06-04 | End: 2024-06-04 | Stop reason: HOSPADM

## 2024-06-04 RX ADMIN — SODIUM CHLORIDE: 9 INJECTION, SOLUTION INTRAVENOUS at 10:06

## 2024-06-04 NOTE — PLAN OF CARE
Vital Signs Stable, No apparent distress noted; Pt tolerated ____Injectafer  w/o difficulty.  24g piv removed;No bleeding,swelling or drainage noted to the site.  AVS/Discharge instructions reviewed;all questions answered;Pt verbalizes understanding. Next appointments scheduled and sent via mychart;  Ambulated from clinic in NAD

## 2024-06-04 NOTE — PROGRESS NOTES
Referring provider: Blanche Restrepo  Reason for visit:  Voice treatment (CPT 74749)  Session #1    History / Subjective   I had the pleasure of seeing Surinder Elizabeth for her first treatment session following complete voice evaluation on 5/7/24.  During that time, improvements were noted on SOVT/resonant voice exercises.    6/4/24: Since evaluation, she has been adhering to vocal rest, humidification, hydration, reflux gourmet.  Overall feels improvement.  Describes that she is at about 75-80% of her full voice function.  Feels there are a few upper range notes that are not back to full power yet.     Objective   The primary goal of todays session was to review HEP.  This was targeted using SOVT/resonant treatment modalities.    Perceptual/behavioral assessment  -CAPE-V Overall Score: 6  -Quality:  mild roughness  -Volume: appropriate for age and gender identity  -Pitch: appropriate for age and gender identity  -Flexibility: appropriate for age and gender identity  -Habitual respiratory pattern: chest/clavicular    Treatment  Reviewed lip trills, optimal placement for speaking, overall vocal health related to rest, reflux, speaking/singing habits.  She inquired about surgery if she feels it is indicated at any point.  Providing typical post op voice rest recs of about a week of silence followed by 1-2 more weeks of modified voice rest.   For home practice, clinician reviewed home exercises as practiced during the session with the patient.     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  Prognosis for continued improvement is good.     Recommendations / POC    Recommend 2-4 sessions of voice therapy over 3-8 weeks with a speech-language pathologist to optimize glottal postures for improved vocal function, vocal efficiency, and ease of phonation.  Pt is consistent with adhering to recommendations and plans to f/u with Dr. Garvin in July  for f/u scope.  Will schedule more voice therapy at that time if indicated.  She is in agreement.       Functional goals  Length Status Goal   Long term Ongoing  Patient will implement and adhere to vocal hygiene protocols on a daily basis, including the elimination of phonotraumatic behaviors.    Long term Ongoing  Patient and clinician will facilitate changes in vocal function in order to restore functional use of voice for daily occupational, social, and emotional demands.    Long term Ongoing  Patient will re-establish phonation with adequate balance of airflow and resonance with decreased muscle tension.    Long term Ongoing  Patient will improve coordination of respiration and phonation for efficient vocal production at a conversational level.    Short term Ongoing  Patient will complete SOVT exercises and/or resonant-focused exercises 3-5x daily to strengthen and balance the intrinsic laryngeal musculature and maximize glottic closure without medial hyperfunction.   Short term Ongoing  Patient will improve the quality, efficiency, and ease of phonation through resonant and/or airflow exercises from the syllable to conversation level with 80% accuracy.   Short term Ongoing  Patient will increase awareness for voice conservations behaviors by following the 50/10 rule and reduce voice use in competing noise environments.    Short term Ongoing  Patient will demonstrate the ability to increase awareness of voicing behavior through self-monitoring to facilitate generalization in functional speaking situations with 80% accuracy.

## 2024-06-06 ENCOUNTER — TELEPHONE (OUTPATIENT)
Dept: OBSTETRICS AND GYNECOLOGY | Facility: CLINIC | Age: 49
End: 2024-06-06
Payer: COMMERCIAL

## 2024-06-06 NOTE — TELEPHONE ENCOUNTER
Called pt to r/s her appt today 6/6 due to / [facility] emergency c-sec. Pt was satisfied w her new appt time. Pt verbally expressed understanding.

## 2024-06-11 ENCOUNTER — INFUSION (OUTPATIENT)
Dept: INFUSION THERAPY | Facility: OTHER | Age: 49
End: 2024-06-11
Attending: INTERNAL MEDICINE
Payer: COMMERCIAL

## 2024-06-11 VITALS
OXYGEN SATURATION: 100 % | RESPIRATION RATE: 17 BRPM | HEART RATE: 73 BPM | SYSTOLIC BLOOD PRESSURE: 114 MMHG | DIASTOLIC BLOOD PRESSURE: 79 MMHG

## 2024-06-11 DIAGNOSIS — D50.0 IRON DEFICIENCY ANEMIA DUE TO CHRONIC BLOOD LOSS: Primary | ICD-10-CM

## 2024-06-11 PROCEDURE — 63600175 PHARM REV CODE 636 W HCPCS: Mod: JZ,JG | Performed by: INTERNAL MEDICINE

## 2024-06-11 PROCEDURE — 96365 THER/PROPH/DIAG IV INF INIT: CPT

## 2024-06-11 PROCEDURE — 25000003 PHARM REV CODE 250: Performed by: INTERNAL MEDICINE

## 2024-06-11 RX ORDER — HEPARIN 100 UNIT/ML
500 SYRINGE INTRAVENOUS
Status: DISCONTINUED | OUTPATIENT
Start: 2024-06-11 | End: 2024-06-11 | Stop reason: HOSPADM

## 2024-06-11 RX ORDER — SODIUM CHLORIDE 0.9 % (FLUSH) 0.9 %
10 SYRINGE (ML) INJECTION
Status: DISCONTINUED | OUTPATIENT
Start: 2024-06-11 | End: 2024-06-11 | Stop reason: HOSPADM

## 2024-06-11 RX ORDER — EPINEPHRINE 0.3 MG/.3ML
0.3 INJECTION SUBCUTANEOUS ONCE AS NEEDED
Status: DISCONTINUED | OUTPATIENT
Start: 2024-06-11 | End: 2024-06-11 | Stop reason: HOSPADM

## 2024-06-11 RX ORDER — DIPHENHYDRAMINE HYDROCHLORIDE 50 MG/ML
50 INJECTION INTRAMUSCULAR; INTRAVENOUS ONCE AS NEEDED
Status: DISCONTINUED | OUTPATIENT
Start: 2024-06-11 | End: 2024-06-11 | Stop reason: HOSPADM

## 2024-06-11 RX ADMIN — SODIUM CHLORIDE: 9 INJECTION, SOLUTION INTRAVENOUS at 11:06

## 2024-06-11 RX ADMIN — FERRIC CARBOXYMALTOSE INJECTION 750 MG: 50 INJECTION, SOLUTION INTRAVENOUS at 11:06

## 2024-06-11 NOTE — PLAN OF CARE
Problem: Adult Inpatient Plan of Care  Goal: Plan of Care Review  Outcome: Progressing  Goal: Patient-Specific Goal (Individualized)  Outcome: Progressing     Problem: Anemia  Goal: Anemia Symptom Improvement  Outcome: Progressing       Patient presented today for iv injectafer infusion. PIV started to RAC and infusion given with no issues. VS remained stable throughout visit and assessment provided no issues. PIV removed w/o complications, all questions answered and left clinic ambulatory in no acute distress.

## 2024-06-14 ENCOUNTER — OFFICE VISIT (OUTPATIENT)
Dept: OBSTETRICS AND GYNECOLOGY | Facility: CLINIC | Age: 49
End: 2024-06-14
Payer: COMMERCIAL

## 2024-06-14 VITALS
WEIGHT: 129.88 LBS | DIASTOLIC BLOOD PRESSURE: 78 MMHG | SYSTOLIC BLOOD PRESSURE: 118 MMHG | BODY MASS INDEX: 20.04 KG/M2

## 2024-06-14 DIAGNOSIS — Z01.419 ENCOUNTER FOR WELL WOMAN EXAM WITH ROUTINE GYNECOLOGICAL EXAM: Primary | ICD-10-CM

## 2024-06-14 DIAGNOSIS — D25.9 UTERINE LEIOMYOMA, UNSPECIFIED LOCATION: ICD-10-CM

## 2024-06-14 DIAGNOSIS — D50.0 IRON DEFICIENCY ANEMIA DUE TO CHRONIC BLOOD LOSS: ICD-10-CM

## 2024-06-14 PROCEDURE — 99999 PR PBB SHADOW E&M-EST. PATIENT-LVL III: CPT | Mod: PBBFAC,,, | Performed by: OBSTETRICS & GYNECOLOGY

## 2024-06-14 PROCEDURE — 1160F RVW MEDS BY RX/DR IN RCRD: CPT | Mod: CPTII,S$GLB,, | Performed by: OBSTETRICS & GYNECOLOGY

## 2024-06-14 PROCEDURE — 99396 PREV VISIT EST AGE 40-64: CPT | Mod: S$GLB,,, | Performed by: OBSTETRICS & GYNECOLOGY

## 2024-06-14 PROCEDURE — 3074F SYST BP LT 130 MM HG: CPT | Mod: CPTII,S$GLB,, | Performed by: OBSTETRICS & GYNECOLOGY

## 2024-06-14 PROCEDURE — 1159F MED LIST DOCD IN RCRD: CPT | Mod: CPTII,S$GLB,, | Performed by: OBSTETRICS & GYNECOLOGY

## 2024-06-14 PROCEDURE — 3008F BODY MASS INDEX DOCD: CPT | Mod: CPTII,S$GLB,, | Performed by: OBSTETRICS & GYNECOLOGY

## 2024-06-14 PROCEDURE — 3078F DIAST BP <80 MM HG: CPT | Mod: CPTII,S$GLB,, | Performed by: OBSTETRICS & GYNECOLOGY

## 2024-06-14 NOTE — PROGRESS NOTES
Chief Complaint: Annual exam    HPI:   48 y.o.  here today for annual exam. PMH fibroids and iron deficiency anemia. Patient reports her cycles are monthly lasting 4-5 days, changing a menstrual disc every 3-4 hours. Denies significant pelvic pain or cramping but sometimes with pelvic pressure. Most recent H/H 7.5/27.8. In April she had 2 periods/intermittent bleeding which was unusual for her. Denies post-coital bleeding. Denies abnormal breast symptoms. Father with prostate cancer, otherwise negative FH of breast, uterine, ovarian, or colon cancer.  Patient is doing well today with no complaints or concerns. She is sexually active using nothing/pullout for birth control. She declines STD testing today. She has been vaccinated against COVID-19.    TVUS (3/2023): Uterus 10.2x6.1x6.8 cm. Multiple fibroids, largest 5.1 cm, fundal subserosal. Submucosal measuring 3.1 cm, two intramural fibroids 3 cm each. EMS normal 14 mm. BOV wnl.    Labs / Significant Studies  Pap (2021): NILM/HPV neg  MMG (2024): Done at outside hospital, normal per patient   Colonoscopy: Repeat in 10 years (done at outside hospital)    LMP Dates from Last 1 Encounters:   LMP: 2024       Past Medical History:   Diagnosis Date    Anemia, unspecified     Breast disorder     Fibroids.    Herpes simplex virus (HSV) infection     HSV2.     History reviewed. No pertinent surgical history.    Current Outpatient Medications:     valACYclovir (VALTREX) 1000 MG tablet, Take 1 tablet by mouth 2 (two) times daily. (Patient not taking: Reported on 2024), Disp: , Rfl:   Review of patient's allergies indicates:  No Known Allergies  OB History    Para Term  AB Living   0 0 0 0 0 0   SAB IAB Ectopic Multiple Live Births   0 0 0 0 0     Social History     Tobacco Use    Smoking status: Never    Smokeless tobacco: Never   Substance Use Topics    Alcohol use: Yes     Comment: Socially.    Drug use: No     Family History   Problem  Relation Name Age of Onset    Hypertension Father      Prostate cancer Father  68    Breast cancer Neg Hx      Colon cancer Neg Hx      Ovarian cancer Neg Hx         Review of Systems   Negative except as in HPI     Physical Exam   Vitals:    06/14/24 0938   BP: 118/78     Body mass index is 20.04 kg/m².    Physical Exam  Constitutional:       General: She is not in acute distress.     Appearance: Normal appearance.     Genitourinary:    Vulva, vagina and urethral meatus normal.   The external female genitalia was normal.   No external genitalia lesions identified,Genitalia hair distrobution normal .   No vaginal discharge or bleeding in the vagina.    No vaginal prolapse present.     No vaginal atrophy present.  Right adnexum displays no mass, no tenderness and no fullness. Left adnexum displays no mass, no tenderness and no fullness. Cervix is normal. Cervix exhibits no motion tenderness and no lesion. Uterus is enlarged, hosting fibroids, uterine contour abnormal  and uterine consistancy abnormal . Uterus is not tender.    Uterus is anteverted.   Breasts:     Right: No inverted nipple, mass, nipple discharge or skin change.      Left: No inverted nipple, mass, nipple discharge or skin change.   HENT:      Head: Normocephalic and atraumatic.   Eyes:      Extraocular Movements: Extraocular movements intact.      Pupils: Pupils are equal, round, and reactive to light.   Neck:      Thyroid: No thyroid mass, thyromegaly or thyroid tenderness.   Pulmonary:      Effort: Pulmonary effort is normal.   Abdominal:      General: Abdomen is flat. There is no distension.      Palpations: Abdomen is soft.      Tenderness: There is no abdominal tenderness. There is no guarding or rebound.   Musculoskeletal:         General: Normal range of motion.      Cervical back: Normal range of motion.   Lymphadenopathy:      Cervical: No cervical adenopathy.      Upper Body:      Right upper body: No supraclavicular or axillary adenopathy.       Left upper body: No supraclavicular or axillary adenopathy.   Neurological:      General: No focal deficit present.      Mental Status: She is alert and oriented to person, place, and time.   Skin:     General: Skin is warm and dry.   Psychiatric:         Mood and Affect: Mood normal.         Behavior: Behavior normal.   Vitals reviewed.          ASSESSMENT:   Annual Well Women Exam  Patient Active Problem List   Diagnosis    Iron deficiency anemia due to chronic blood loss    Bartholin cyst    Bartholin's gland abscess    Chronic right shoulder pain    Decreased right shoulder range of motion    Decreased strength of upper extremity    Dysphonia    Vocal cord nodule    Vocal fold edema    Uterine leiomyoma    Encounter for well woman exam with routine gynecological exam     Health Maintenance Due   Topic Date Due    Pneumococcal Vaccines (Age 0-64) (1 of 2 - PCV) Never done    Colorectal Cancer Screening  Never done    COVID-19 Vaccine (3 - Pfizer risk series) 09/14/2021     Health Maintenance Topics with due status: Not Due       Topic Last Completion Date    Cervical Cancer Screening 07/22/2021    Lipid Panel 05/17/2022    TETANUS VACCINE 08/10/2023    Mammogram 01/25/2024    Influenza Vaccine Not Due       PLAN:  Problem List Items Addressed This Visit          Renal/    Uterine leiomyoma    Current Assessment & Plan     TVUS ordered today, RTC 2 weeks for virtual visit to discuss results and treatment options for fibroids with clinically significant iron deficiency anemia.          Relevant Orders    US Pelvis Comp with Transvag NON-OB (xpd (Completed)    Encounter for well woman exam with routine gynecological exam - Primary    Current Assessment & Plan     Normal breast and pelvic exams except as noted. Pap/cotesting up to date. Continue breast self awareness. MMG and colonoscopy up to date. Recommend 30 minutes of exercise 5 times weekly. Counseled patient on promoting a healthy lifestyle including  regular weightbearing physical activity (30 minutes on most days of the week), adequate nutrition (protein, calcium, and vitamin D), continued smoking cessation, no or moderate alcohol intake, and fall prevention. Follow up with PCP as scheduled for routine health maintenance.               Oncology    Iron deficiency anemia due to chronic blood loss     Other Visit Diagnoses       Colon cancer screening                Follow up in about 2 weeks (around 6/28/2024) for Virtual Visit/Results.       Madeline Shin MD  Department of Obstetrics & Gynecology  [facility]

## 2024-06-18 ENCOUNTER — HOSPITAL ENCOUNTER (OUTPATIENT)
Dept: RADIOLOGY | Facility: OTHER | Age: 49
Discharge: HOME | End: 2024-06-18
Attending: OBSTETRICS & GYNECOLOGY
Payer: COMMERCIAL

## 2024-06-18 DIAGNOSIS — D25.9 UTERINE LEIOMYOMA, UNSPECIFIED LOCATION: ICD-10-CM

## 2024-06-18 PROCEDURE — 76856 US EXAM PELVIC COMPLETE: CPT | Mod: TC

## 2024-06-18 PROCEDURE — 76856 US EXAM PELVIC COMPLETE: CPT | Mod: 26,,, | Performed by: RADIOLOGY

## 2024-06-18 PROCEDURE — 76830 TRANSVAGINAL US NON-OB: CPT | Mod: 26,,, | Performed by: RADIOLOGY

## 2024-06-18 NOTE — ASSESSMENT & PLAN NOTE
TVUS ordered today, RTC 2 weeks for virtual visit to discuss results and treatment options for fibroids with clinically significant iron deficiency anemia.

## 2024-06-18 NOTE — ASSESSMENT & PLAN NOTE
Normal breast and pelvic exams except as noted. Pap/cotesting up to date. Continue breast self awareness. MMG and colonoscopy up to date. Recommend 30 minutes of exercise 5 times weekly. Counseled patient on promoting a healthy lifestyle including regular weightbearing physical activity (30 minutes on most days of the week), adequate nutrition (protein, calcium, and vitamin D), continued smoking cessation, no or moderate alcohol intake, and fall prevention. Follow up with PCP as scheduled for routine health maintenance.

## 2024-06-27 NOTE — TELEPHONE ENCOUNTER
----- Message from Michael Lozada sent at 6/27/2024 11:23 AM CDT -----      Can the clinic reply in [facility]:Y        Please refill the medication(s) listed below. Please call the patient when the prescription(s) is ready for  at this phone number         Medication #1 valACYclovir (VALTREX) 1000 MG tablet    Medication #2       Preferred Pharmacy: [facility]/pharmacy #[id number] - [address]   Phone: [phone]  Fax: [phone]

## 2024-06-28 ENCOUNTER — OFFICE VISIT (OUTPATIENT)
Dept: OBSTETRICS AND GYNECOLOGY | Facility: CLINIC | Age: 49
End: 2024-06-28
Payer: COMMERCIAL

## 2024-06-28 DIAGNOSIS — A60.9 HSV (HERPES SIMPLEX VIRUS) ANOGENITAL INFECTION: ICD-10-CM

## 2024-06-28 DIAGNOSIS — D25.1 ABNORMAL UTERINE BLEEDING DUE TO INTRAMURAL LEIOMYOMA: Primary | ICD-10-CM

## 2024-06-28 DIAGNOSIS — N93.9 ABNORMAL UTERINE BLEEDING DUE TO INTRAMURAL LEIOMYOMA: Primary | ICD-10-CM

## 2024-06-28 PROBLEM — Z01.419 ENCOUNTER FOR WELL WOMAN EXAM WITH ROUTINE GYNECOLOGICAL EXAM: Status: RESOLVED | Noted: 2024-06-14 | Resolved: 2024-06-28

## 2024-06-28 RX ORDER — NORETHINDRONE ACETATE AND ETHINYL ESTRADIOL 1MG-20(21)
1 KIT ORAL DAILY
Qty: 28 TABLET | Refills: 11 | Status: SHIPPED | OUTPATIENT
Start: 2024-06-28 | End: 2024-06-28

## 2024-06-28 RX ORDER — NORETHINDRONE ACETATE AND ETHINYL ESTRADIOL 1MG-20(21)
1 KIT ORAL DAILY
Qty: 28 TABLET | Refills: 11 | Status: SHIPPED | OUTPATIENT
Start: 2024-06-28

## 2024-06-28 RX ORDER — VALACYCLOVIR HYDROCHLORIDE 1 G/1
1000 TABLET, FILM COATED ORAL 2 TIMES DAILY
OUTPATIENT
Start: 2024-06-28

## 2024-06-28 RX ORDER — VALACYCLOVIR HYDROCHLORIDE 1 G/1
1000 TABLET, FILM COATED ORAL DAILY
Qty: 30 TABLET | Refills: 5 | Status: SHIPPED | OUTPATIENT
Start: 2024-06-28 | End: 2025-06-28

## 2024-06-28 NOTE — Clinical Note
This patient has AUB-L with two large posterior fibroids (6 cm and 4 cm) and is interested in discussing minimally invasive myomectomy with you. Are you able to contact her to get her scheduled? We are trying OCPs in the meantime to help with her significant iron deficiency anemia. Thanks!

## 2024-06-28 NOTE — PROGRESS NOTES
The patient location is: [facility]  The chief complaint leading to consultation is: AUB  Visit type: audiovisual  Total time spent with patient: 28 minutes    Each patient to whom he or she provides medical services by telemedicine is:  (1) informed of the relationship between the physician and patient and the respective role of any other health care provider with respect to management of the patient; and (2) notified that he or she may decline to receive medical services by telemedicine and may withdraw from such care at any time.      Chief Complaint: Discuss AUB     HPI:      Surinder Elizabeth is a 48 y.o.  who presents via virtual visit to discuss ultrasound results and treatment options for AUB. She has a known h/o fibroids and anemia requiring IV iron infusions.     Patient reports her cycles are monthly lasting 4-5 days, changing a menstrual disc every 3-4 hours. Denies significant pelvic pain or cramping but sometimes with pelvic pressure. Most recent H/H 7.5/27.8. In April she had 2 periods/intermittent bleeding which was unusual for her.     Patient has regular monthly menses. Patient's last menstrual period was 2024 (exact date).    ROS:     GENERAL: Denies fevers or chills. Feeling well overall.   ABDOMEN: Denies abdominal pain, constipation, diarrhea, nausea, vomiting, change in appetite.   URINARY: Denies frequency, dysuria, hematuria.  GYNECOLOGIC: See HPI.  NEUROLOGIC: Denies syncope or weakness.     Physical Exam:      PHYSICAL EXAM:  LMP 2024 (Exact Date) Comment: regular pds  There is no height or weight on file to calculate BMI.     APPEARANCE: Well nourished, well developed, in no acute distress.  Exam deferred due to televisit    Results:      Pap (2021): NILM/HPV neg  TVUS (2024): Uterus 10.5 x 5.9 x 10.3 cm. Posterior submucosal fibroid 3 cm. 2 posterior intramural fibroids measuring 6 and 4 cm. EMS normal 7 mm. BOV not visualized. No FF.  MMG (2024):  BIRADS-1    Assessment/Plan:     Problem List Items Addressed This Visit          Renal/    Abnormal uterine bleeding due to intramural leiomyoma - Primary    Current Assessment & Plan     Patient counseled on treatment options including cyclic or continuous OCPs, Depo Provera, Mirena IUD, oral Provera, myomectomy, UAE, radiofrequency ablation of fibroids, Myfembree, and hysterectomy. She is considering myomectomy and would like to be referred to Dr. Euceda. In the meantime will start OCPs, no absolute contraindications. Risks, benefits, and alternatives of each medication discussed.     Recommend setting a timer on her phone to remind patient to take pills around the same time every day. Breakthrough bleeding is common in the first few months after starting pills. If sexually active, use a backup method like condoms or abstain for the first 7 days after starting OCPs to prevent unintended pregnancy. Call the office or contact us if more than two pills in a row are missed, counseled on need for backup method and possibility of irregular bleeding and pregnancy with missed pills. Patient verbalized understanding and is amenable to the plan.             Relevant Medications    norethindrone-ethinyl estradiol (JUNEL FE 1/20) 1 mg-20 mcg (21)/75 mg (7) per tablet    Other Relevant Orders    Ambulatory referral/consult to Obstetrics / Gynecology       ID    HSV (herpes simplex virus) anogenital infection    Relevant Medications    valACYclovir (VALTREX) 1000 MG tablet       Counseling:       Use of the [facility] Patient Portal discussed and encouraged during today's visit.       Madeline Shin MD  Department of Obstetrics & Gynecology  [facility]

## 2024-06-28 NOTE — ASSESSMENT & PLAN NOTE
Patient counseled on treatment options including cyclic or continuous OCPs, Depo Provera, Mirena IUD, oral Provera, myomectomy, UAE, radiofrequency ablation of fibroids, Myfembree, and hysterectomy. She is considering myomectomy and would like to be referred to Dr. Euceda. In the meantime will start OCPs, no absolute contraindications. Risks, benefits, and alternatives of each medication discussed.     Recommend setting a timer on her phone to remind patient to take pills around the same time every day. Breakthrough bleeding is common in the first few months after starting pills. If sexually active, use a backup method like condoms or abstain for the first 7 days after starting OCPs to prevent unintended pregnancy. Call the office or contact us if more than two pills in a row are missed, counseled on need for backup method and possibility of irregular bleeding and pregnancy with missed pills. Patient verbalized understanding and is amenable to the plan.

## 2024-07-01 ENCOUNTER — TELEPHONE (OUTPATIENT)
Dept: OBSTETRICS AND GYNECOLOGY | Facility: CLINIC | Age: 49
End: 2024-07-01
Payer: COMMERCIAL

## 2024-07-01 DIAGNOSIS — D25.1 FIBROIDS, INTRAMURAL: Primary | ICD-10-CM

## 2024-07-01 NOTE — TELEPHONE ENCOUNTER
----- Message from Madeline Shin MD sent at 6/28/2024  2:04 PM CDT -----  This patient has AUB-L with two large posterior fibroids (6 cm and 4 cm) and is interested in discussing minimally invasive myomectomy with you. Are you able to contact her to get her scheduled? We are trying OCPs in the meantime to help with her significant iron deficiency anemia. Thanks!

## 2024-07-01 NOTE — TELEPHONE ENCOUNTER
Ganesh Correa,    Thanks for reaching out! We will get her set up!    @Atif, please schedule MRI and consult.

## 2024-07-02 NOTE — TELEPHONE ENCOUNTER
Called pt. She said she had a consult already scheduled but asked if there was anything earlier. R/s consult from 9/5 to 8/22 @ 11:15am. Scheduled BMP and MRI at [facility]. Told pt to let us know if she needs anything else. Pt VU and no further questions.

## 2024-07-03 ENCOUNTER — TELEPHONE (OUTPATIENT)
Dept: OBSTETRICS AND GYNECOLOGY | Facility: CLINIC | Age: 49
End: 2024-07-03
Payer: COMMERCIAL

## 2024-07-03 NOTE — TELEPHONE ENCOUNTER
Received referral msg to schedule pts referral and that she needs to be r/s. Spoke to pt yesterday and scheduled her consult for 8/22 @ 11:15. trc if she needs to r/s the scheduled appt.

## 2024-07-19 ENCOUNTER — LAB VISIT (OUTPATIENT)
Dept: LAB | Facility: OTHER | Age: 49
End: 2024-07-19
Attending: INTERNAL MEDICINE
Payer: COMMERCIAL

## 2024-07-19 DIAGNOSIS — D50.0 IRON DEFICIENCY ANEMIA DUE TO CHRONIC BLOOD LOSS: ICD-10-CM

## 2024-07-19 DIAGNOSIS — D25.1 FIBROIDS, INTRAMURAL: ICD-10-CM

## 2024-07-19 LAB
ALBUMIN SERPL BCP-MCNC: 3.9 G/DL (ref 3.5–5.2)
ALP SERPL-CCNC: 81 U/L (ref 55–135)
ALT SERPL W/O P-5'-P-CCNC: 16 U/L (ref 10–44)
ANION GAP SERPL CALC-SCNC: 6 MMOL/L (ref 8–16)
ANION GAP SERPL CALC-SCNC: 6 MMOL/L (ref 8–16)
ANISOCYTOSIS BLD QL SMEAR: SLIGHT
AST SERPL-CCNC: 12 U/L (ref 10–40)
BASOPHILS # BLD AUTO: 0.02 K/UL (ref 0–0.2)
BASOPHILS NFR BLD: 0.7 % (ref 0–1.9)
BILIRUB SERPL-MCNC: 0.3 MG/DL (ref 0.1–1)
BUN SERPL-MCNC: 9 MG/DL (ref 6–20)
BUN SERPL-MCNC: 9 MG/DL (ref 6–20)
CALCIUM SERPL-MCNC: 8.9 MG/DL (ref 8.7–10.5)
CALCIUM SERPL-MCNC: 8.9 MG/DL (ref 8.7–10.5)
CHLORIDE SERPL-SCNC: 106 MMOL/L (ref 95–110)
CHLORIDE SERPL-SCNC: 106 MMOL/L (ref 95–110)
CO2 SERPL-SCNC: 27 MMOL/L (ref 23–29)
CO2 SERPL-SCNC: 27 MMOL/L (ref 23–29)
CREAT SERPL-MCNC: 0.7 MG/DL (ref 0.5–1.4)
CREAT SERPL-MCNC: 0.7 MG/DL (ref 0.5–1.4)
DACRYOCYTES BLD QL SMEAR: ABNORMAL
DIFFERENTIAL METHOD BLD: ABNORMAL
EOSINOPHIL # BLD AUTO: 0 K/UL (ref 0–0.5)
EOSINOPHIL NFR BLD: 0.7 % (ref 0–8)
ERYTHROCYTE [DISTWIDTH] IN BLOOD BY AUTOMATED COUNT: ABNORMAL % (ref 11.5–14.5)
EST. GFR  (NO RACE VARIABLE): >60 ML/MIN/1.73 M^2
EST. GFR  (NO RACE VARIABLE): >60 ML/MIN/1.73 M^2
FERRITIN SERPL-MCNC: 62 NG/ML (ref 20–300)
GLUCOSE SERPL-MCNC: 93 MG/DL (ref 70–110)
GLUCOSE SERPL-MCNC: 93 MG/DL (ref 70–110)
HCT VFR BLD AUTO: 40.6 % (ref 37–48.5)
HGB BLD-MCNC: 12.4 G/DL (ref 12–16)
HYPOCHROMIA BLD QL SMEAR: ABNORMAL
IMM GRANULOCYTES # BLD AUTO: 0 K/UL (ref 0–0.04)
IMM GRANULOCYTES NFR BLD AUTO: 0 % (ref 0–0.5)
IRON SERPL-MCNC: 62 UG/DL (ref 30–160)
LYMPHOCYTES # BLD AUTO: 1 K/UL (ref 1–4.8)
LYMPHOCYTES NFR BLD: 34.1 % (ref 18–48)
MCH RBC QN AUTO: 26.3 PG (ref 27–31)
MCHC RBC AUTO-ENTMCNC: 30.5 G/DL (ref 32–36)
MCV RBC AUTO: 86 FL (ref 82–98)
MONOCYTES # BLD AUTO: 0.4 K/UL (ref 0.3–1)
MONOCYTES NFR BLD: 13.2 % (ref 4–15)
NEUTROPHILS # BLD AUTO: 1.5 K/UL (ref 1.8–7.7)
NEUTROPHILS NFR BLD: 51.3 % (ref 38–73)
NRBC BLD-RTO: 0 /100 WBC
OVALOCYTES BLD QL SMEAR: ABNORMAL
PLATELET # BLD AUTO: 327 K/UL (ref 150–450)
PLATELET BLD QL SMEAR: ABNORMAL
PMV BLD AUTO: 8.3 FL (ref 9.2–12.9)
POIKILOCYTOSIS BLD QL SMEAR: SLIGHT
POLYCHROMASIA BLD QL SMEAR: ABNORMAL
POTASSIUM SERPL-SCNC: 4.7 MMOL/L (ref 3.5–5.1)
POTASSIUM SERPL-SCNC: 4.7 MMOL/L (ref 3.5–5.1)
PROT SERPL-MCNC: 6.9 G/DL (ref 6–8.4)
RBC # BLD AUTO: 4.72 M/UL (ref 4–5.4)
SATURATED IRON: 18 % (ref 20–50)
SODIUM SERPL-SCNC: 139 MMOL/L (ref 136–145)
SODIUM SERPL-SCNC: 139 MMOL/L (ref 136–145)
TOTAL IRON BINDING CAPACITY: 348 UG/DL (ref 250–450)
TRANSFERRIN SERPL-MCNC: 235 MG/DL (ref 200–375)
WBC # BLD AUTO: 2.96 K/UL (ref 3.9–12.7)

## 2024-07-19 PROCEDURE — 85025 COMPLETE CBC W/AUTO DIFF WBC: CPT | Performed by: INTERNAL MEDICINE

## 2024-07-19 PROCEDURE — 82728 ASSAY OF FERRITIN: CPT | Performed by: INTERNAL MEDICINE

## 2024-07-19 PROCEDURE — 83540 ASSAY OF IRON: CPT | Performed by: INTERNAL MEDICINE

## 2024-07-19 PROCEDURE — 80053 COMPREHEN METABOLIC PANEL: CPT | Performed by: INTERNAL MEDICINE

## 2024-07-19 PROCEDURE — 36415 COLL VENOUS BLD VENIPUNCTURE: CPT | Performed by: INTERNAL MEDICINE

## 2024-07-23 ENCOUNTER — OFFICE VISIT (OUTPATIENT)
Dept: HEMATOLOGY/ONCOLOGY | Facility: CLINIC | Age: 49
End: 2024-07-23
Payer: COMMERCIAL

## 2024-07-23 ENCOUNTER — HOSPITAL ENCOUNTER (OUTPATIENT)
Dept: RADIOLOGY | Facility: OTHER | Age: 49
Discharge: HOME | End: 2024-07-23
Attending: OBSTETRICS & GYNECOLOGY
Payer: COMMERCIAL

## 2024-07-23 VITALS
DIASTOLIC BLOOD PRESSURE: 69 MMHG | RESPIRATION RATE: 14 BRPM | WEIGHT: 131.63 LBS | BODY MASS INDEX: 19.95 KG/M2 | TEMPERATURE: 99 F | HEART RATE: 71 BPM | HEIGHT: 68 IN | SYSTOLIC BLOOD PRESSURE: 102 MMHG | OXYGEN SATURATION: 100 %

## 2024-07-23 DIAGNOSIS — D25.1 ABNORMAL UTERINE BLEEDING DUE TO INTRAMURAL LEIOMYOMA: ICD-10-CM

## 2024-07-23 DIAGNOSIS — D50.0 IRON DEFICIENCY ANEMIA DUE TO CHRONIC BLOOD LOSS: Primary | ICD-10-CM

## 2024-07-23 DIAGNOSIS — N93.9 ABNORMAL UTERINE BLEEDING DUE TO INTRAMURAL LEIOMYOMA: ICD-10-CM

## 2024-07-23 DIAGNOSIS — D70.8 BENIGN ETHNIC NEUTROPENIA: ICD-10-CM

## 2024-07-23 DIAGNOSIS — D25.1 FIBROIDS, INTRAMURAL: ICD-10-CM

## 2024-07-23 PROCEDURE — 3008F BODY MASS INDEX DOCD: CPT | Mod: CPTII,S$GLB,, | Performed by: INTERNAL MEDICINE

## 2024-07-23 PROCEDURE — 99214 OFFICE O/P EST MOD 30 MIN: CPT | Mod: S$GLB,,, | Performed by: INTERNAL MEDICINE

## 2024-07-23 PROCEDURE — 3078F DIAST BP <80 MM HG: CPT | Mod: CPTII,S$GLB,, | Performed by: INTERNAL MEDICINE

## 2024-07-23 PROCEDURE — 72197 MRI PELVIS W/O & W/DYE: CPT | Mod: 26,,, | Performed by: RADIOLOGY

## 2024-07-23 PROCEDURE — 1159F MED LIST DOCD IN RCRD: CPT | Mod: CPTII,S$GLB,, | Performed by: INTERNAL MEDICINE

## 2024-07-23 PROCEDURE — 1160F RVW MEDS BY RX/DR IN RCRD: CPT | Mod: CPTII,S$GLB,, | Performed by: INTERNAL MEDICINE

## 2024-07-23 PROCEDURE — 72197 MRI PELVIS W/O & W/DYE: CPT | Mod: TC

## 2024-07-23 PROCEDURE — 99999 PR PBB SHADOW E&M-EST. PATIENT-LVL III: CPT | Mod: PBBFAC,,, | Performed by: INTERNAL MEDICINE

## 2024-07-23 PROCEDURE — A9585 GADOBUTROL INJECTION: HCPCS | Performed by: OBSTETRICS & GYNECOLOGY

## 2024-07-23 PROCEDURE — 3074F SYST BP LT 130 MM HG: CPT | Mod: CPTII,S$GLB,, | Performed by: INTERNAL MEDICINE

## 2024-07-23 PROCEDURE — 25500020 PHARM REV CODE 255: Performed by: OBSTETRICS & GYNECOLOGY

## 2024-07-23 RX ORDER — GADOBUTROL 604.72 MG/ML
6 INJECTION INTRAVENOUS
Status: COMPLETED | OUTPATIENT
Start: 2024-07-23 | End: 2024-07-23

## 2024-07-23 RX ADMIN — GADOBUTROL 6 ML: 604.72 INJECTION INTRAVENOUS at 05:07

## 2024-08-04 PROBLEM — R29.898 DECREASED STRENGTH OF UPPER EXTREMITY: Status: RESOLVED | Noted: 2024-03-20 | Resolved: 2024-08-04

## 2024-08-04 PROBLEM — M25.611 DECREASED RIGHT SHOULDER RANGE OF MOTION: Status: RESOLVED | Noted: 2024-03-20 | Resolved: 2024-08-04

## 2024-08-04 PROBLEM — D70.8 BENIGN ETHNIC NEUTROPENIA: Status: ACTIVE | Noted: 2024-08-04

## 2024-08-04 PROBLEM — Z67.A1 BENIGN ETHNIC NEUTROPENIA: Status: ACTIVE | Noted: 2024-08-04

## 2024-08-22 ENCOUNTER — OFFICE VISIT (OUTPATIENT)
Dept: OBSTETRICS AND GYNECOLOGY | Facility: CLINIC | Age: 49
End: 2024-08-22
Payer: COMMERCIAL

## 2024-08-22 VITALS
DIASTOLIC BLOOD PRESSURE: 79 MMHG | BODY MASS INDEX: 19.94 KG/M2 | SYSTOLIC BLOOD PRESSURE: 114 MMHG | WEIGHT: 129.19 LBS

## 2024-08-22 DIAGNOSIS — N93.9 ABNORMAL UTERINE BLEEDING DUE TO INTRAMURAL LEIOMYOMA: ICD-10-CM

## 2024-08-22 DIAGNOSIS — D25.1 ABNORMAL UTERINE BLEEDING DUE TO INTRAMURAL LEIOMYOMA: ICD-10-CM

## 2024-08-22 DIAGNOSIS — D25.1 FIBROIDS, INTRAMURAL: Primary | ICD-10-CM

## 2024-08-22 PROCEDURE — 99215 OFFICE O/P EST HI 40 MIN: CPT | Mod: S$GLB,,, | Performed by: OBSTETRICS & GYNECOLOGY

## 2024-08-22 PROCEDURE — 99999 PR PBB SHADOW E&M-EST. PATIENT-LVL III: CPT | Mod: PBBFAC,,, | Performed by: OBSTETRICS & GYNECOLOGY

## 2024-08-22 NOTE — PROGRESS NOTES
CC: Symptoms related to fibroids    Surinder Elizabeth is a 49 y.o. female  presents for a consultation for management of fibroids.     She reports cycles are heavy which is causing anemia. She has received IV iron.     MRI shows:    FINDINGS:  Patient was administered 10 cc of Gadavist.     Cervix and vagina are unremarkable.  There are multiple uterine leiomyomas.  The largest is fundal measuring 5.7 cm.  Uterus measures 11 x 6 x 10 cm.  Endometrium measures 17 mm.  At least 1 fibroid is submucosal.  The rest appear to be intramural or subserosal.  No definite pedunculated fibroids are seen.  There is a small left ovarian cyst.  No adenopathy is seen.  No adnexal masses or fluid collections are seen.  Post-contrast images demonstrate and expected pattern of enhancement.     Impression:     Multiple uterine leiomyomas as above.  At least 1 of these is submucosal.  The rest are intramural and subserosal but no pedunculated fibroids are seen.       Past Medical History:   Diagnosis Date    Anemia, unspecified     Breast lump in female     Herpes simplex virus (HSV) infection     HSV2.       History reviewed. No pertinent surgical history.    Family History   Problem Relation Name Age of Onset    Hypertension Father      Prostate cancer Father  68    Breast cancer Neg Hx      Colon cancer Neg Hx      Ovarian cancer Neg Hx         Social History     Tobacco Use    Smoking status: Never    Smokeless tobacco: Never   Substance Use Topics    Alcohol use: Yes     Comment: Socially.    Drug use: No       OB History    Para Term  AB Living   0 0 0 0 0 0   SAB IAB Ectopic Multiple Live Births   0 0 0 0 0       /79   Wt 58.6 kg (129 lb 3 oz)   LMP 2024 (Exact Date)   BMI 19.94 kg/m²     ROS:  GENERAL: Denies weight gain or weight loss. Feeling well overall.   SKIN: Denies rash or lesions.   HEAD: Denies head injury or headache.   NODES: Denies enlarged lymph nodes.   CHEST: Denies chest pain or  shortness of breath.   CARDIOVASCULAR: Denies palpitations or left sided chest pain.   ABDOMEN: No abdominal pain, constipation, diarrhea, nausea, vomiting or rectal bleeding.   URINARY: No frequency, dysuria, hematuria, or burning on urination.  REPRODUCTIVE: See HPI.   HEMATOLOGIC: No easy bruisability or excessive bleeding with the exception of menstrual cycles.  MUSCULOSKELETAL: Denies joint pain or swelling.   NEUROLOGIC: Denies syncope or weakness.   PSYCHIATRIC: Denies depression, anxiety or mood swings.    PHYSICAL EXAM:  APPEARANCE: Well nourished, well developed, in no acute distress.  AFFECT: WNL, alert and oriented x 3  SKIN: No acne or hirsutism  NECK: Neck symmetric without masses or thyromegaly  NODES: No inguinal, cervical, axillary, or femoral lymph node enlargement  CHEST: Good respiratory effect  ABDOMEN: Soft.  No tenderness or masses.  No hepatosplenomegaly.  No hernias.  PELVIC: Normal external genitalia without lesions.  Normal hair distribution.  Adequate perineal body, normal urethral meatus.  Vagina moist and well rugated without lesions or discharge.  Cervix pink, without lesions, discharge or tenderness.  No significant cystocele or rectocele.  Bimanual exam shows uterus to be 12 week size, regular, mobile and nontender.  Adnexa without masses or tenderness.    EXTREMITIES: No edema.      ICD-10-CM ICD-9-CM    1. Fibroids, intramural  D25.1 218.1       2. Abnormal uterine bleeding due to intramural leiomyoma  N93.9 626.6 Ambulatory referral/consult to Obstetrics / Gynecology    D25.1 218.1         Reviewed MRI with patient. Discussed irregular bleeding is most likely related to the large submucosal fibroid. Treatment options discussed hysteroscopy, hysteroscopy + RFA, DVM, hysterectomy. Patient is unsure of how she would like to proceed.     She will contact the office when she decides how she would like to proceed.

## 2024-08-23 ENCOUNTER — PATIENT MESSAGE (OUTPATIENT)
Dept: OBSTETRICS AND GYNECOLOGY | Facility: CLINIC | Age: 49
End: 2024-08-23
Payer: COMMERCIAL

## 2024-08-26 ENCOUNTER — PATIENT MESSAGE (OUTPATIENT)
Dept: OBSTETRICS AND GYNECOLOGY | Facility: CLINIC | Age: 49
End: 2024-08-26
Payer: COMMERCIAL

## 2024-08-28 DIAGNOSIS — D25.0 SUBMUCOUS UTERINE FIBROID: Primary | ICD-10-CM

## 2024-08-28 DIAGNOSIS — D25.0 FIBROIDS, SUBMUCOSAL: ICD-10-CM

## 2024-08-28 RX ORDER — MUPIROCIN 20 MG/G
OINTMENT TOPICAL
OUTPATIENT
Start: 2024-08-28

## 2024-08-28 RX ORDER — SODIUM CHLORIDE 9 MG/ML
INJECTION, SOLUTION INTRAVENOUS CONTINUOUS
OUTPATIENT
Start: 2024-08-28

## 2024-08-28 RX ORDER — FAMOTIDINE 20 MG/1
20 TABLET, FILM COATED ORAL
Status: SHIPPED | OUTPATIENT
Start: 2024-08-28

## 2024-09-10 ENCOUNTER — LAB VISIT (OUTPATIENT)
Dept: LAB | Facility: OTHER | Age: 49
End: 2024-09-10
Attending: INTERNAL MEDICINE
Payer: COMMERCIAL

## 2024-09-10 DIAGNOSIS — D50.0 IRON DEFICIENCY ANEMIA DUE TO CHRONIC BLOOD LOSS: ICD-10-CM

## 2024-09-10 LAB
BASOPHILS # BLD AUTO: 0.02 K/UL (ref 0–0.2)
BASOPHILS NFR BLD: 0.5 % (ref 0–1.9)
CHOLEST SERPL-MCNC: 155 MG/DL (ref 120–199)
CHOLEST/HDLC SERPL: 2.1 {RATIO} (ref 2–5)
DIFFERENTIAL METHOD BLD: ABNORMAL
EOSINOPHIL # BLD AUTO: 0 K/UL (ref 0–0.5)
EOSINOPHIL NFR BLD: 0.5 % (ref 0–8)
ERYTHROCYTE [DISTWIDTH] IN BLOOD BY AUTOMATED COUNT: 14 % (ref 11.5–14.5)
FERRITIN SERPL-MCNC: 10 NG/ML (ref 20–300)
HCT VFR BLD AUTO: 38.3 % (ref 37–48.5)
HDLC SERPL-MCNC: 74 MG/DL (ref 40–75)
HDLC SERPL: 47.7 % (ref 20–50)
HGB BLD-MCNC: 12.1 G/DL (ref 12–16)
IMM GRANULOCYTES # BLD AUTO: 0.01 K/UL (ref 0–0.04)
IMM GRANULOCYTES NFR BLD AUTO: 0.3 % (ref 0–0.5)
IRON SERPL-MCNC: 30 UG/DL (ref 30–160)
LDLC SERPL CALC-MCNC: 70.4 MG/DL (ref 63–159)
LYMPHOCYTES # BLD AUTO: 1.1 K/UL (ref 1–4.8)
LYMPHOCYTES NFR BLD: 27.6 % (ref 18–48)
MCH RBC QN AUTO: 27.9 PG (ref 27–31)
MCHC RBC AUTO-ENTMCNC: 31.6 G/DL (ref 32–36)
MCV RBC AUTO: 89 FL (ref 82–98)
MONOCYTES # BLD AUTO: 0.5 K/UL (ref 0.3–1)
MONOCYTES NFR BLD: 12.1 % (ref 4–15)
NEUTROPHILS # BLD AUTO: 2.2 K/UL (ref 1.8–7.7)
NEUTROPHILS NFR BLD: 59 % (ref 38–73)
NONHDLC SERPL-MCNC: 81 MG/DL
NRBC BLD-RTO: 0 /100 WBC
PLATELET # BLD AUTO: 261 K/UL (ref 150–450)
PMV BLD AUTO: 8.7 FL (ref 9.2–12.9)
RBC # BLD AUTO: 4.33 M/UL (ref 4–5.4)
SATURATED IRON: 7 % (ref 20–50)
TOTAL IRON BINDING CAPACITY: 407 UG/DL (ref 250–450)
TRANSFERRIN SERPL-MCNC: 275 MG/DL (ref 200–375)
TRIGL SERPL-MCNC: 53 MG/DL (ref 30–150)
WBC # BLD AUTO: 3.8 K/UL (ref 3.9–12.7)

## 2024-09-10 PROCEDURE — 83540 ASSAY OF IRON: CPT | Performed by: INTERNAL MEDICINE

## 2024-09-10 PROCEDURE — 36415 COLL VENOUS BLD VENIPUNCTURE: CPT | Performed by: INTERNAL MEDICINE

## 2024-09-10 PROCEDURE — 80061 LIPID PANEL: CPT | Performed by: INTERNAL MEDICINE

## 2024-09-10 PROCEDURE — 82728 ASSAY OF FERRITIN: CPT | Performed by: INTERNAL MEDICINE

## 2024-09-10 PROCEDURE — 85025 COMPLETE CBC W/AUTO DIFF WBC: CPT | Performed by: INTERNAL MEDICINE

## 2024-09-19 ENCOUNTER — LAB VISIT (OUTPATIENT)
Dept: LAB | Facility: HOSPITAL | Age: 49
End: 2024-09-19
Attending: OBSTETRICS & GYNECOLOGY
Payer: COMMERCIAL

## 2024-09-19 ENCOUNTER — OFFICE VISIT (OUTPATIENT)
Dept: OBSTETRICS AND GYNECOLOGY | Facility: CLINIC | Age: 49
End: 2024-09-19
Payer: COMMERCIAL

## 2024-09-19 VITALS
BODY MASS INDEX: 20.07 KG/M2 | SYSTOLIC BLOOD PRESSURE: 146 MMHG | WEIGHT: 130.06 LBS | DIASTOLIC BLOOD PRESSURE: 90 MMHG

## 2024-09-19 DIAGNOSIS — Z01.818 PREOPERATIVE EXAM FOR GYNECOLOGIC SURGERY: ICD-10-CM

## 2024-09-19 DIAGNOSIS — Z01.818 PREOPERATIVE EXAM FOR GYNECOLOGIC SURGERY: Primary | ICD-10-CM

## 2024-09-19 LAB
ABO + RH BLD: NORMAL
BLD GP AB SCN CELLS X3 SERPL QL: NORMAL

## 2024-09-19 PROCEDURE — 99499 UNLISTED E&M SERVICE: CPT | Mod: S$GLB,,, | Performed by: OBSTETRICS & GYNECOLOGY

## 2024-09-19 PROCEDURE — 86901 BLOOD TYPING SEROLOGIC RH(D): CPT | Performed by: OBSTETRICS & GYNECOLOGY

## 2024-09-19 PROCEDURE — 86850 RBC ANTIBODY SCREEN: CPT | Performed by: OBSTETRICS & GYNECOLOGY

## 2024-09-19 PROCEDURE — 99999 PR PBB SHADOW E&M-EST. PATIENT-LVL III: CPT | Mod: PBBFAC,,, | Performed by: OBSTETRICS & GYNECOLOGY

## 2024-09-19 PROCEDURE — 86900 BLOOD TYPING SEROLOGIC ABO: CPT | Performed by: OBSTETRICS & GYNECOLOGY

## 2024-09-19 PROCEDURE — 36415 COLL VENOUS BLD VENIPUNCTURE: CPT | Performed by: OBSTETRICS & GYNECOLOGY

## 2024-09-19 NOTE — H&P (VIEW-ONLY)
CC: Preop exam    Surinder Elizabeth is a 49 y.o. female  presents for a pre-op exam for a hysteroscopic myomectomy scheduled on 2024.      She reports cycles are heavy which is causing anemia. She has received IV iron.      MRI shows:     FINDINGS:  Patient was administered 10 cc of Gadavist.     Cervix and vagina are unremarkable.  There are multiple uterine leiomyomas.  The largest is fundal measuring 5.7 cm.  Uterus measures 11 x 6 x 10 cm.  Endometrium measures 17 mm.  At least 1 fibroid is submucosal.  The rest appear to be intramural or subserosal.  No definite pedunculated fibroids are seen.  There is a small left ovarian cyst.  No adenopathy is seen.  No adnexal masses or fluid collections are seen.  Post-contrast images demonstrate and expected pattern of enhancement.     Impression:     Multiple uterine leiomyomas as above.  At least 1 of these is submucosal.  The rest are intramural and subserosal but no pedunculated fibroids are seen.    Past Medical History:   Diagnosis Date    Anemia, unspecified     Breast lump in female     Herpes simplex virus (HSV) infection     HSV2.       History reviewed. No pertinent surgical history.    OB History    Para Term  AB Living   0 0 0 0 0 0   SAB IAB Ectopic Multiple Live Births   0 0 0 0 0       Family History   Problem Relation Name Age of Onset    Hypertension Father      Prostate cancer Father  68    Breast cancer Neg Hx      Colon cancer Neg Hx      Ovarian cancer Neg Hx         Social History     Tobacco Use    Smoking status: Never    Smokeless tobacco: Never   Substance Use Topics    Alcohol use: Yes     Comment: Socially.    Drug use: No       BP (!) 146/90   Wt 59 kg (130 lb 1.1 oz)   LMP 2024 (Exact Date)   BMI 20.07 kg/m²     Current Outpatient Medications on File Prior to Visit   Medication Sig Dispense Refill    valACYclovir (VALTREX) 1000 MG tablet Take 1 tablet (1,000 mg total) by mouth once daily. For 5 days as needed  for outbreaks 30 tablet 5    [DISCONTINUED] norethindrone-ethinyl estradiol (JUNEL FE 1/20) 1 mg-20 mcg (21)/75 mg (7) per tablet Take 1 tablet by mouth once daily. (Patient not taking: Reported on 9/19/2024) 28 tablet 11     Current Facility-Administered Medications on File Prior to Visit   Medication Dose Route Frequency Provider Last Rate Last Admin    famotidine tablet 20 mg  20 mg Oral On Call Procedure Evita Euceda MD            Review of patient's allergies indicates:  No Known Allergies     ROS:  GENERAL: Denies weight gain or weight loss. Feeling well overall.   SKIN: Denies rash or lesions.   HEAD: Denies head injury or headache.   NODES: Denies enlarged lymph nodes.   CHEST: Denies chest pain or shortness of breath.   CARDIOVASCULAR: Denies palpitations or left sided chest pain.   ABDOMEN: No abdominal pain, constipation, diarrhea, nausea, vomiting or rectal bleeding.   URINARY: No frequency, dysuria, hematuria, or burning on urination.  REPRODUCTIVE: See HPI.   HEMATOLOGIC: No easy bruisability or excessive bleeding with the exception of menstrual cycles.  MUSCULOSKELETAL: Denies joint pain or swelling.   NEUROLOGIC: Denies syncope or weakness.   PSYCHIATRIC: Denies depression, anxiety or mood swings.    PHYSICAL EXAM:  APPEARANCE: Well nourished, well developed, in no acute distress.  AFFECT: WNL, alert and oriented x 3  SKIN: No acne or hirsutism  NECK: Neck symmetric without masses or thyromegaly  NODES: No inguinal, cervical, axillary, or femoral lymph node enlargement  CHEST: Good respiratory effect  ABDOMEN: Soft.  No tenderness or masses.  No hepatosplenomegaly.  No hernias.  PELVIC: Deferred  EXTREMITIES: No edema.      ICD-10-CM ICD-9-CM    1. Preoperative exam for gynecologic surgery  Z01.818 V72.83 TYPE AND SCREEN PREOP          Reviewed MRI with patient. Discussed irregular bleeding is most likely related to the large submucosal fibroid. Treatment options discussed hysteroscopy,  hysteroscopy + RFA, DVM, hysterectomy. Patient desires hysteroscopy.     Surgical risks discussed including but not limited to bleeding, infection, Asherman's syndrome, uterine perforation, hysteroscopic fluid overload.    I have discussed the risks, benefits, indications, and alternatives of the procedure in detail.  The patient verbalizes her understanding.  All questions answered.  Consents signed.  The patient agrees to proceed to proceed as planned.

## 2024-09-23 ENCOUNTER — ANESTHESIA EVENT (OUTPATIENT)
Dept: SURGERY | Facility: HOSPITAL | Age: 49
End: 2024-09-23
Payer: COMMERCIAL

## 2024-09-24 ENCOUNTER — HOSPITAL ENCOUNTER (OUTPATIENT)
Facility: HOSPITAL | Age: 49
Discharge: HOME | End: 2024-09-24
Attending: OBSTETRICS & GYNECOLOGY | Admitting: OBSTETRICS & GYNECOLOGY
Payer: COMMERCIAL

## 2024-09-24 ENCOUNTER — ANESTHESIA (OUTPATIENT)
Dept: SURGERY | Facility: HOSPITAL | Age: 49
End: 2024-09-24
Payer: COMMERCIAL

## 2024-09-24 VITALS
OXYGEN SATURATION: 98 % | TEMPERATURE: 98 F | RESPIRATION RATE: 14 BRPM | HEIGHT: 68 IN | HEART RATE: 65 BPM | BODY MASS INDEX: 19.71 KG/M2 | WEIGHT: 130.06 LBS | DIASTOLIC BLOOD PRESSURE: 65 MMHG | SYSTOLIC BLOOD PRESSURE: 114 MMHG

## 2024-09-24 DIAGNOSIS — D25.0 FIBROIDS, SUBMUCOSAL: ICD-10-CM

## 2024-09-24 DIAGNOSIS — Z98.890 STATUS POST HYSTEROSCOPY: Primary | ICD-10-CM

## 2024-09-24 DIAGNOSIS — D25.0 SUBMUCOUS UTERINE FIBROID: ICD-10-CM

## 2024-09-24 DIAGNOSIS — Z98.890 STATUS POST HYSTEROSCOPIC MYOMECTOMY: ICD-10-CM

## 2024-09-24 DIAGNOSIS — Z98.890 STATUS POST HYSTEROSCOPY: ICD-10-CM

## 2024-09-24 LAB — POCT GLUCOSE: 94 MG/DL (ref 70–110)

## 2024-09-24 PROCEDURE — 25000003 PHARM REV CODE 250: Performed by: UROLOGY

## 2024-09-24 PROCEDURE — 36000706: Performed by: OBSTETRICS & GYNECOLOGY

## 2024-09-24 PROCEDURE — 37000008 HC ANESTHESIA 1ST 15 MINUTES: Performed by: OBSTETRICS & GYNECOLOGY

## 2024-09-24 PROCEDURE — 36000707: Performed by: OBSTETRICS & GYNECOLOGY

## 2024-09-24 PROCEDURE — 63600175 PHARM REV CODE 636 W HCPCS: Performed by: UROLOGY

## 2024-09-24 PROCEDURE — 27201423 OPTIME MED/SURG SUP & DEVICES STERILE SUPPLY: Performed by: OBSTETRICS & GYNECOLOGY

## 2024-09-24 PROCEDURE — 63600175 PHARM REV CODE 636 W HCPCS: Mod: JZ,JG | Performed by: OBSTETRICS & GYNECOLOGY

## 2024-09-24 PROCEDURE — 25000003 PHARM REV CODE 250

## 2024-09-24 PROCEDURE — 71000015 HC POSTOP RECOV 1ST HR: Performed by: OBSTETRICS & GYNECOLOGY

## 2024-09-24 PROCEDURE — 37000009 HC ANESTHESIA EA ADD 15 MINS: Performed by: OBSTETRICS & GYNECOLOGY

## 2024-09-24 PROCEDURE — 25000003 PHARM REV CODE 250: Performed by: NURSE ANESTHETIST, CERTIFIED REGISTERED

## 2024-09-24 PROCEDURE — C1782 MORCELLATOR: HCPCS | Performed by: OBSTETRICS & GYNECOLOGY

## 2024-09-24 PROCEDURE — 88305 TISSUE EXAM BY PATHOLOGIST: CPT | Performed by: PATHOLOGY

## 2024-09-24 PROCEDURE — 58561 HYSTEROSCOPY REMOVE MYOMA: CPT | Mod: ,,, | Performed by: OBSTETRICS & GYNECOLOGY

## 2024-09-24 PROCEDURE — 63600175 PHARM REV CODE 636 W HCPCS: Performed by: NURSE ANESTHETIST, CERTIFIED REGISTERED

## 2024-09-24 PROCEDURE — 88305 TISSUE EXAM BY PATHOLOGIST: CPT | Mod: 26,,, | Performed by: PATHOLOGY

## 2024-09-24 PROCEDURE — 71000033 HC RECOVERY, INTIAL HOUR: Performed by: OBSTETRICS & GYNECOLOGY

## 2024-09-24 RX ORDER — DIPHENHYDRAMINE HCL 25 MG
25 CAPSULE ORAL EVERY 4 HOURS PRN
Status: DISCONTINUED | OUTPATIENT
Start: 2024-09-24 | End: 2024-09-24 | Stop reason: HOSPADM

## 2024-09-24 RX ORDER — HYDROMORPHONE HYDROCHLORIDE 2 MG/ML
0.2 INJECTION, SOLUTION INTRAMUSCULAR; INTRAVENOUS; SUBCUTANEOUS EVERY 5 MIN PRN
Status: DISCONTINUED | OUTPATIENT
Start: 2024-09-24 | End: 2024-09-24 | Stop reason: HOSPADM

## 2024-09-24 RX ORDER — LIDOCAINE HYDROCHLORIDE 20 MG/ML
INJECTION INTRAVENOUS
Status: DISCONTINUED | OUTPATIENT
Start: 2024-09-24 | End: 2024-09-24

## 2024-09-24 RX ORDER — MIDAZOLAM HYDROCHLORIDE 1 MG/ML
INJECTION INTRAMUSCULAR; INTRAVENOUS
Status: DISCONTINUED | OUTPATIENT
Start: 2024-09-24 | End: 2024-09-24

## 2024-09-24 RX ORDER — PROCHLORPERAZINE EDISYLATE 5 MG/ML
5 INJECTION INTRAMUSCULAR; INTRAVENOUS EVERY 30 MIN PRN
Status: DISCONTINUED | OUTPATIENT
Start: 2024-09-24 | End: 2024-09-24 | Stop reason: HOSPADM

## 2024-09-24 RX ORDER — MEPERIDINE HYDROCHLORIDE 50 MG/ML
12.5 INJECTION INTRAMUSCULAR; INTRAVENOUS; SUBCUTANEOUS ONCE AS NEEDED
Status: DISCONTINUED | OUTPATIENT
Start: 2024-09-24 | End: 2024-09-24 | Stop reason: HOSPADM

## 2024-09-24 RX ORDER — IBUPROFEN 600 MG/1
600 TABLET ORAL EVERY 6 HOURS PRN
Status: DISCONTINUED | OUTPATIENT
Start: 2024-09-24 | End: 2024-09-24 | Stop reason: HOSPADM

## 2024-09-24 RX ORDER — OXYCODONE HYDROCHLORIDE 5 MG/1
5 TABLET ORAL
Status: DISCONTINUED | OUTPATIENT
Start: 2024-09-24 | End: 2024-09-24 | Stop reason: HOSPADM

## 2024-09-24 RX ORDER — DEXAMETHASONE SODIUM PHOSPHATE 4 MG/ML
INJECTION, SOLUTION INTRA-ARTICULAR; INTRALESIONAL; INTRAMUSCULAR; INTRAVENOUS; SOFT TISSUE
Status: DISCONTINUED | OUTPATIENT
Start: 2024-09-24 | End: 2024-09-24

## 2024-09-24 RX ORDER — ONDANSETRON 8 MG/1
8 TABLET, ORALLY DISINTEGRATING ORAL EVERY 8 HOURS PRN
Status: DISCONTINUED | OUTPATIENT
Start: 2024-09-24 | End: 2024-09-24 | Stop reason: HOSPADM

## 2024-09-24 RX ORDER — GLUCAGON 1 MG
1 KIT INJECTION
Status: DISCONTINUED | OUTPATIENT
Start: 2024-09-24 | End: 2024-09-24 | Stop reason: HOSPADM

## 2024-09-24 RX ORDER — VASOPRESSIN 20 [USP'U]/ML
INJECTION, SOLUTION INTRAMUSCULAR; SUBCUTANEOUS
Status: DISCONTINUED | OUTPATIENT
Start: 2024-09-24 | End: 2024-09-24 | Stop reason: HOSPADM

## 2024-09-24 RX ORDER — ACETAMINOPHEN 10 MG/ML
INJECTION, SOLUTION INTRAVENOUS
Status: DISCONTINUED | OUTPATIENT
Start: 2024-09-24 | End: 2024-09-24

## 2024-09-24 RX ORDER — IBUPROFEN 800 MG/1
800 TABLET ORAL EVERY 8 HOURS PRN
Qty: 30 TABLET | Refills: 0 | Status: SHIPPED | OUTPATIENT
Start: 2024-09-24

## 2024-09-24 RX ORDER — KETOROLAC TROMETHAMINE 30 MG/ML
INJECTION, SOLUTION INTRAMUSCULAR; INTRAVENOUS
Status: DISCONTINUED | OUTPATIENT
Start: 2024-09-24 | End: 2024-09-24

## 2024-09-24 RX ORDER — HYDROCODONE BITARTRATE AND ACETAMINOPHEN 5; 325 MG/1; MG/1
1 TABLET ORAL EVERY 6 HOURS PRN
Qty: 5 TABLET | Refills: 0 | Status: SHIPPED | OUTPATIENT
Start: 2024-09-24

## 2024-09-24 RX ORDER — PROPOFOL 10 MG/ML
VIAL (ML) INTRAVENOUS
Status: DISCONTINUED | OUTPATIENT
Start: 2024-09-24 | End: 2024-09-24

## 2024-09-24 RX ORDER — ONDANSETRON HYDROCHLORIDE 2 MG/ML
4 INJECTION, SOLUTION INTRAVENOUS DAILY PRN
Status: DISCONTINUED | OUTPATIENT
Start: 2024-09-24 | End: 2024-09-24 | Stop reason: HOSPADM

## 2024-09-24 RX ORDER — FENTANYL CITRATE 50 UG/ML
INJECTION, SOLUTION INTRAMUSCULAR; INTRAVENOUS
Status: DISCONTINUED | OUTPATIENT
Start: 2024-09-24 | End: 2024-09-24

## 2024-09-24 RX ORDER — SODIUM CHLORIDE 9 MG/ML
INJECTION, SOLUTION INTRAVENOUS CONTINUOUS
Status: DISCONTINUED | OUTPATIENT
Start: 2024-09-24 | End: 2024-09-24 | Stop reason: HOSPADM

## 2024-09-24 RX ORDER — DIPHENHYDRAMINE HYDROCHLORIDE 50 MG/ML
25 INJECTION INTRAMUSCULAR; INTRAVENOUS EVERY 4 HOURS PRN
Status: DISCONTINUED | OUTPATIENT
Start: 2024-09-24 | End: 2024-09-24 | Stop reason: HOSPADM

## 2024-09-24 RX ORDER — ONDANSETRON HYDROCHLORIDE 2 MG/ML
INJECTION, SOLUTION INTRAMUSCULAR; INTRAVENOUS
Status: DISCONTINUED | OUTPATIENT
Start: 2024-09-24 | End: 2024-09-24

## 2024-09-24 RX ORDER — MUPIROCIN 20 MG/G
OINTMENT TOPICAL
Status: DISCONTINUED | OUTPATIENT
Start: 2024-09-24 | End: 2024-09-24 | Stop reason: HOSPADM

## 2024-09-24 RX ORDER — HYDROCODONE BITARTRATE AND ACETAMINOPHEN 5; 325 MG/1; MG/1
1 TABLET ORAL EVERY 4 HOURS PRN
Status: DISCONTINUED | OUTPATIENT
Start: 2024-09-24 | End: 2024-09-24 | Stop reason: HOSPADM

## 2024-09-24 RX ORDER — OXYCODONE HYDROCHLORIDE 5 MG/1
10 TABLET ORAL EVERY 4 HOURS PRN
Status: DISCONTINUED | OUTPATIENT
Start: 2024-09-24 | End: 2024-09-24 | Stop reason: HOSPADM

## 2024-09-24 RX ADMIN — ONDANSETRON 8 MG: 2 INJECTION INTRAMUSCULAR; INTRAVENOUS at 09:09

## 2024-09-24 RX ADMIN — FENTANYL CITRATE 25 MCG: 0.05 INJECTION, SOLUTION INTRAMUSCULAR; INTRAVENOUS at 09:09

## 2024-09-24 RX ADMIN — PROCHLORPERAZINE EDISYLATE 5 MG: 5 INJECTION INTRAMUSCULAR; INTRAVENOUS at 11:09

## 2024-09-24 RX ADMIN — DEXAMETHASONE SODIUM PHOSPHATE 8 MG: 4 INJECTION, SOLUTION INTRA-ARTICULAR; INTRALESIONAL; INTRAMUSCULAR; INTRAVENOUS; SOFT TISSUE at 09:09

## 2024-09-24 RX ADMIN — Medication 20 MG: at 09:09

## 2024-09-24 RX ADMIN — KETOROLAC TROMETHAMINE 30 MG: 30 INJECTION, SOLUTION INTRAMUSCULAR; INTRAVENOUS at 10:09

## 2024-09-24 RX ADMIN — ACETAMINOPHEN 1000 MG: 10 INJECTION, SOLUTION INTRAVENOUS at 09:09

## 2024-09-24 RX ADMIN — HYDROMORPHONE HYDROCHLORIDE 0.2 MG: 2 INJECTION, SOLUTION INTRAMUSCULAR; INTRAVENOUS; SUBCUTANEOUS at 11:09

## 2024-09-24 RX ADMIN — Medication 200 MG: at 09:09

## 2024-09-24 RX ADMIN — FENTANYL CITRATE 25 MCG: 0.05 INJECTION, SOLUTION INTRAMUSCULAR; INTRAVENOUS at 10:09

## 2024-09-24 RX ADMIN — SODIUM CHLORIDE, SODIUM LACTATE, POTASSIUM CHLORIDE, AND CALCIUM CHLORIDE: .6; .31; .03; .02 INJECTION, SOLUTION INTRAVENOUS at 09:09

## 2024-09-24 RX ADMIN — MIDAZOLAM HYDROCHLORIDE 2 MG: 1 INJECTION, SOLUTION INTRAMUSCULAR; INTRAVENOUS at 09:09

## 2024-09-24 RX ADMIN — LIDOCAINE HYDROCHLORIDE 100 MG: 20 INJECTION, SOLUTION INTRAVENOUS at 09:09

## 2024-09-24 RX ADMIN — OXYCODONE 5 MG: 5 TABLET ORAL at 10:09

## 2024-09-24 NOTE — DISCHARGE SUMMARY
[facility] - Surgery (Hospital)  Discharge Note  Short Stay    Procedure(s) (LRB):  MYOMECTOMY, HYSTEROSCOPIC (N/A)      OUTCOME: Patient tolerated treatment/procedure well without complication and is now ready for discharge.    DISPOSITION: Home or Self Care    FINAL DIAGNOSIS:  Status post hysteroscopy    FOLLOWUP: In clinic    DISCHARGE INSTRUCTIONS:    Discharge Procedure Orders   Notify your health care provider if you experience any of the following:  temperature >100.4     Notify your health care provider if you experience any of the following:  persistent nausea and vomiting or diarrhea     Notify your health care provider if you experience any of the following:  severe uncontrolled pain     Notify your health care provider if you experience any of the following:  redness, tenderness, or signs of infection (pain, swelling, redness, odor or green/yellow discharge around incision site)     Notify your health care provider if you experience any of the following:  difficulty breathing or increased cough     Notify your health care provider if you experience any of the following:  severe persistent headache     Notify your health care provider if you experience any of the following:  worsening rash     Notify your health care provider if you experience any of the following:  persistent dizziness, light-headedness, or visual disturbances     Notify your health care provider if you experience any of the following:  increased confusion or weakness      Evita Barbosa MD, MAS, FACOG  Obstetrics and Gynecology

## 2024-09-24 NOTE — OP NOTE
Surgery Date: 09/24/2024     SURGEON: Evita Euceda    ASSISTANT: None    PREOP DIAGNOSIS:   Fibroids  Menorrhagia    POSTOP DIAGNOSIS:    1. Fibroids  2. Menorrhagia    PROCEDURES: Hysteroscopic myomectomy    ANESTHESIA: general    FINDINGS/KEY COMPONENTS: Uterus sounded to 13 cm. Large, left posterior wall submucosal fibroid.     ESTIMATED BLOOD LOSS: 5 cc    COMPLICATIONS: None    PROCEDURE: Patient was taking to the operating room where general anesthesia was administered and found to be adequate.  She was prepped and draped in the dorsal lithotomy position.  A weighted sterile speculum was placed in the vagina.  The anterior lip of the cervix was grasped with a single tooth tenaculum.  The uterus was sounded to approximately 13 cm.  The hysteroscope was advanced through the cervical os. Visualization was difficult due to patient menstruating. 10 cc of 20U in 100 cc of vasopression injected at 12 and 6 o'clock into the face of the cervix. Once more visibility achieved, the above findings were noted. Using the Myosure XL, the fibroid was morcellated and removed. It was found to be very calcified. Approximately 50% removed once 2500 cc NS fluid deficit reached.     Sharp curettage was performed until a gritty texture was noted in all four quadrants. Slight bulge of the posterior fibroid noted during curettage.     All instruments were removed. Excellent hemostasis was noted. Sponge, lap, needle counts were correct x 2. Patient was taken to the recovery room awake and in stable condition.

## 2024-09-24 NOTE — ANESTHESIA PREPROCEDURE EVALUATION
09/24/2024  Surinder Elizabeth is a 49 y.o., female with AUB secondary to uterine fibroids for hysteroscopic resection.     Iron Def Anemia -- Hb OK but Fe+ levels very low  Type and Screen Sent  Hx of anesthetics in past without complications  NPO>8 hours  No food or drug allergies  Amenable to proceed with scheduled procedure     Procedure: HYSTEROSCOPY (N/A)         Patient Active Problem List   Diagnosis    Iron deficiency anemia due to chronic blood loss    Bartholin cyst    Bartholin's gland abscess    Chronic right shoulder pain    Dysphonia    Vocal cord nodule    Vocal fold edema    Uterine leiomyoma    Abnormal uterine bleeding due to intramural leiomyoma    HSV (herpes simplex virus) anogenital infection    Benign ethnic neutropenia       Past Medical History:   Diagnosis Date    Anemia, unspecified 2023    Breast lump in female     Herpes simplex virus (HSV) infection     HSV2.       ECHO: No results found for this or any previous visit.      There is no height or weight on file to calculate BMI.    Tobacco Use: Low Risk  (9/24/2024)    Patient History     Smoking Tobacco Use: Never     Smokeless Tobacco Use: Never     Passive Exposure: Not on file       Social History     Substance and Sexual Activity   Drug Use No        Alcohol Use: Alcohol Misuse (6/6/2024)    Received from [facility]    AUDIT-C     Frequency of Alcohol Consumption: 2-3 times a week     Average Number of Drinks: 3 or 4     Frequency of Binge Drinking: Never       Review of patient's allergies indicates:  No Known Allergies      Airway:  No value filed.         Pre-op Assessment    I have reviewed the Patient Summary Reports.     I have reviewed the Nursing Notes. I have reviewed the NPO Status.   I have reviewed the Medications.     Review of Systems  Anesthesia Hx:  No problems with previous Anesthesia   History of prior surgery of  interest to airway management or planning:          Denies Family Hx of Anesthesia complications.    Denies Personal Hx of Anesthesia complications.                    Hematology/Oncology:       -- Anemia: Iron Deficiency Anemia Blood Loss Anemia    chronic                                     Physical Exam  General: Well nourished, Cooperative, Alert and Oriented    Airway:  Mallampati: I / I  Mouth Opening: Small, but > 3cm  TM Distance: Normal  Tongue: Normal  Neck ROM: Normal ROM    Dental:  Intact, Periodontal disease        Anesthesia Plan  Type of Anesthesia, risks & benefits discussed:    Anesthesia Type: Gen ETT  Intra-op Monitoring Plan: Standard ASA Monitors  Post Op Pain Control Plan: multimodal analgesia and IV/PO Opioids PRN  Induction:  IV  Airway Plan: Direct and Video, Post-Induction  Informed Consent: Informed consent signed with the Patient and all parties understand the risks and agree with anesthesia plan.  All questions answered. Patient consented to blood products? No  ASA Score: 2    Ready For Surgery From Anesthesia Perspective.     .

## 2024-09-24 NOTE — TRANSFER OF CARE
"Anesthesia Transfer of Care Note    Patient: Surinder Elizabeth    Procedure(s) Performed: Procedure(s) (LRB):  MYOMECTOMY, HYSTEROSCOPIC (N/A)    Patient location: PACU    Anesthesia Type: general    Transport from OR: Transported from OR on 6-10 L/min O2 by face mask with adequate spontaneous ventilation    Post pain: adequate analgesia    Post assessment: no apparent anesthetic complications    Post vital signs: stable    Level of consciousness: awake and alert    Nausea/Vomiting: no nausea/vomiting    Complications: none    Transfer of care protocol was followed      Last vitals: Visit Vitals  BP (!) 141/91   Pulse 70   Temp 36.7 °C (98.1 °F) (Temporal)   Resp 16   Ht 5' 8" (1.727 m)   Wt 59 kg (130 lb 1.1 oz)   LMP 09/24/2024 (Exact Date)   SpO2 99%   Breastfeeding No   BMI 19.78 kg/m²     "

## 2024-09-24 NOTE — BRIEF OP NOTE
BRIEF OPERATIVE NOTE       SUMMARY      Surgery Date: 09/24/2024     SURGEON: Evita Euceda    ASSISTANT: None    PREOP DIAGNOSIS:   Fibroids  Menorrhagia    POSTOP DIAGNOSIS:    1. Fibroids  2. Menorrhagia    PROCEDURES: Hysteroscopic myomectomy    ANESTHESIA: general    FINDINGS/KEY COMPONENTS: Uterus sounded to 13 cm. Large, left posterior wall submucosal fibroid.     ESTIMATED BLOOD LOSS: 5 cc    COMPLICATIONS: None    PATHOLOGY:   Specimens (From admission, onward)       Start     Ordered    09/24/24 1022  Specimen to Pathology, Surgery Gynecology and Obstetrics  Once        Comments: Pre-op Diagnosis: Submucous uterine fibroid [D25.0]Procedure(s):HYSTEROSCOPY Number of specimens: 1Name of specimens: uterine fibroid - perm     References:    Click here for ordering Quick Tip   Question Answer Comment   Procedure Type: Gynecology and Obstetrics    Release to patient Immediate        09/24/24 1022

## 2024-09-24 NOTE — ANESTHESIA PROCEDURE NOTES
Intubation    Date/Time: 9/24/2024 9:36 AM    Performed by: Cely Gallagher CRNA  Authorized by: Cely Gallagher CRNA    Intubation:     Induction:  Intravenous    Intubated:  Postinduction    Mask Ventilation:  Easy mask    Attempts:  1    Attempted By:  Student    Method of Intubation:  Direct    Difficult Airway Encountered?: No      Complications:  None    Airway Device:  Supraglottic airway/LMA    Airway Device Size:  3.0    Placement Verified By:  Capnometry    Complicating Factors:  None    Findings Post-Intubation:  BS equal bilateral

## 2024-09-24 NOTE — ANESTHESIA POSTPROCEDURE EVALUATION
Anesthesia Post Evaluation    Patient: Surinder Elizabeth    Procedure(s) Performed: Procedure(s) (LRB):  MYOMECTOMY, HYSTEROSCOPIC (N/A)    Final Anesthesia Type: general      Patient location during evaluation: PACU  Patient participation: Yes- Able to Participate  Level of consciousness: awake and alert and oriented  Post-procedure vital signs: reviewed and stable  Pain management: adequate  Airway patency: patent    PONV status at discharge: No PONV  Anesthetic complications: no      Cardiovascular status: hemodynamically stable  Respiratory status: unassisted  Hydration status: euvolemic  Follow-up not needed.              Vitals Value Taken Time   /66 09/24/24 1129   Temp 36.6 °C (97.9 °F) 09/24/24 1115   Pulse 60 09/24/24 1130   Resp 13 09/24/24 1130   SpO2 99 % 09/24/24 1130   Vitals shown include unfiled device data.      Event Time   Out of Recovery 11:29:34         Pain/Girish Score: Pain Rating Prior to Med Admin: 6 (9/24/2024 11:10 AM)  Pain Rating Post Med Admin: 2 (9/24/2024 11:24 AM)  Girish Score: 10 (9/24/2024 11:24 AM)

## 2024-09-25 ENCOUNTER — TELEPHONE (OUTPATIENT)
Dept: OBSTETRICS AND GYNECOLOGY | Facility: CLINIC | Age: 49
End: 2024-09-25
Payer: COMMERCIAL

## 2024-09-25 NOTE — TELEPHONE ENCOUNTER
----- Message from Chai Saini sent at 9/23/2024  1:12 PM CDT -----  .Type:  Needs Medical Advice    Who Called: pt    Would the patient rather a call back or a response via [facility]? Call back  Best Call Back Number: [phone]  Additional Information:     Pt stated she would like a call back for her arrival time for her procedure for tomorrow

## 2024-09-26 LAB
FINAL PATHOLOGIC DIAGNOSIS: NORMAL
GROSS: NORMAL
Lab: NORMAL

## 2024-09-30 ENCOUNTER — PATIENT MESSAGE (OUTPATIENT)
Dept: OBSTETRICS AND GYNECOLOGY | Facility: CLINIC | Age: 49
End: 2024-09-30
Payer: COMMERCIAL

## 2024-09-30 NOTE — TELEPHONE ENCOUNTER
This patient was supposed to only have a 2 week postop appointment with me.    Please cancel the appointment with Dr. Solis that is scheduled tomorrow. Please schedule postop with me on 10/10 at 2:15p

## 2024-09-30 NOTE — TELEPHONE ENCOUNTER
Spoke with pt to cancel 10/1 po with Dr Solis to reschedule to 10/10 with Dr PETERSEN. Pt verbalized understanding

## 2024-10-10 ENCOUNTER — OFFICE VISIT (OUTPATIENT)
Dept: OBSTETRICS AND GYNECOLOGY | Facility: CLINIC | Age: 49
End: 2024-10-10
Payer: COMMERCIAL

## 2024-10-10 VITALS
WEIGHT: 129.88 LBS | SYSTOLIC BLOOD PRESSURE: 115 MMHG | DIASTOLIC BLOOD PRESSURE: 82 MMHG | BODY MASS INDEX: 19.74 KG/M2

## 2024-10-10 DIAGNOSIS — Z09 POSTOP CHECK: Primary | ICD-10-CM

## 2024-10-10 PROCEDURE — 3074F SYST BP LT 130 MM HG: CPT | Mod: CPTII,S$GLB,, | Performed by: OBSTETRICS & GYNECOLOGY

## 2024-10-10 PROCEDURE — 99024 POSTOP FOLLOW-UP VISIT: CPT | Mod: S$GLB,,, | Performed by: OBSTETRICS & GYNECOLOGY

## 2024-10-10 PROCEDURE — 1159F MED LIST DOCD IN RCRD: CPT | Mod: CPTII,S$GLB,, | Performed by: OBSTETRICS & GYNECOLOGY

## 2024-10-10 PROCEDURE — 3079F DIAST BP 80-89 MM HG: CPT | Mod: CPTII,S$GLB,, | Performed by: OBSTETRICS & GYNECOLOGY

## 2024-10-10 PROCEDURE — 99999 PR PBB SHADOW E&M-EST. PATIENT-LVL III: CPT | Mod: PBBFAC,,, | Performed by: OBSTETRICS & GYNECOLOGY

## 2024-10-10 PROCEDURE — 1160F RVW MEDS BY RX/DR IN RCRD: CPT | Mod: CPTII,S$GLB,, | Performed by: OBSTETRICS & GYNECOLOGY

## 2024-10-10 NOTE — PROGRESS NOTES
CC: Postoperative visit    Surinder Elizabeth is a 49 y.o. female  presents for a postoperative visit s/p hysteroscopic myomectomy on 2024.  Her postoperative course was uncomplicated.  She is doing well postoperative.    Pathology showed:      Component 2 wk ago   Final Pathologic Diagnosis Fragments of benign endometrium and underlying smooth muscle proliferation compatible with a submucosal leiomyoma       LMP 2024 (Exact Date) Comment: UPT negative    ROS:  GENERAL: No fever, chills, fatigability or weight loss.  VULVAR: No pain, no lesions and no itching.  VAGINAL: No relaxation, no itching, no discharge, no abnormal bleeding and no lesions.  ABDOMEN: No abdominal pain. Denies nausea. Denies vomiting. No diarrhea. No constipation  BREAST: Denies pain. No lumps. No discharge.  URINARY: No incontinence, no nocturia, no frequency and no dysuria.  CARDIOVASCULAR: No chest pain. No shortness of breath. No leg cramps.  NEUROLOGICAL: No headaches. No vision changes.    Physical Exam  APPEARANCE: Well nourished, well developed, in no acute distress.  AFFECT: WNL, alert and oriented x 3  SKIN: No acne or hirsutism  ABDOMEN: Soft.  No tenderness or masses.  No hepatosplenomegaly.  No hernias.  INCISIONS: Clean, dry, intact. Well healed.   PELVIC: Normal external genitalia without lesions.  Normal hair distribution.  Adequate perineal body, normal urethral meatus.  Vagina moist and well rugated without lesions or discharge.  Cervix pink, without lesions, discharge or tenderness.  No significant cystocele or rectocele.  Bimanual exam shows uterus to be normal size, regular, mobile and nontender.  Adnexa without masses or tenderness.    EXTREMITIES: No edema.      1. Postop check            Patient can return to normal activities.     Discussed surgical findings. Recommended monitoring cycles to determine if enough of the fibroid has been removed to improve bleeding. Discussed if heavy bleeding continues, would  not recommend another hysteroscopy due to the calcification of the fibroid.      Return to clinic in 1 year for well woman exam.

## 2024-11-04 NOTE — TELEPHONE ENCOUNTER
----- Message from Madeline Shin MD sent at 6/28/2024  2:04 PM CDT -----  This patient has AUB-L with two large posterior fibroids (6 cm and 4 cm) and is interested in discussing minimally invasive myomectomy with you. Are you able to contact her to get her scheduled? We are trying OCPs in the meantime to help with her significant iron deficiency anemia. Thanks!   no

## 2024-11-14 ENCOUNTER — LAB VISIT (OUTPATIENT)
Dept: LAB | Facility: OTHER | Age: 49
End: 2024-11-14
Attending: INTERNAL MEDICINE
Payer: COMMERCIAL

## 2024-11-14 DIAGNOSIS — D50.0 IRON DEFICIENCY ANEMIA DUE TO CHRONIC BLOOD LOSS: ICD-10-CM

## 2024-11-14 LAB
ALBUMIN SERPL BCP-MCNC: 3.9 G/DL (ref 3.5–5.2)
ALP SERPL-CCNC: 59 U/L (ref 40–150)
ALT SERPL W/O P-5'-P-CCNC: 20 U/L (ref 10–44)
ANION GAP SERPL CALC-SCNC: 6 MMOL/L (ref 8–16)
AST SERPL-CCNC: 22 U/L (ref 10–40)
BASOPHILS # BLD AUTO: 0.02 K/UL (ref 0–0.2)
BASOPHILS NFR BLD: 0.5 % (ref 0–1.9)
BILIRUB SERPL-MCNC: 0.3 MG/DL (ref 0.1–1)
BUN SERPL-MCNC: 10 MG/DL (ref 6–20)
CALCIUM SERPL-MCNC: 9.3 MG/DL (ref 8.7–10.5)
CHLORIDE SERPL-SCNC: 105 MMOL/L (ref 95–110)
CO2 SERPL-SCNC: 26 MMOL/L (ref 23–29)
CREAT SERPL-MCNC: 0.7 MG/DL (ref 0.5–1.4)
DIFFERENTIAL METHOD BLD: ABNORMAL
EOSINOPHIL # BLD AUTO: 0 K/UL (ref 0–0.5)
EOSINOPHIL NFR BLD: 0.5 % (ref 0–8)
ERYTHROCYTE [DISTWIDTH] IN BLOOD BY AUTOMATED COUNT: 14.1 % (ref 11.5–14.5)
EST. GFR  (NO RACE VARIABLE): >60 ML/MIN/1.73 M^2
FERRITIN SERPL-MCNC: 8 NG/ML (ref 20–300)
GLUCOSE SERPL-MCNC: 90 MG/DL (ref 70–110)
HCT VFR BLD AUTO: 37.3 % (ref 37–48.5)
HGB BLD-MCNC: 11.6 G/DL (ref 12–16)
IMM GRANULOCYTES # BLD AUTO: 0.01 K/UL (ref 0–0.04)
IMM GRANULOCYTES NFR BLD AUTO: 0.3 % (ref 0–0.5)
IRON SERPL-MCNC: 32 UG/DL (ref 30–160)
LYMPHOCYTES # BLD AUTO: 1.1 K/UL (ref 1–4.8)
LYMPHOCYTES NFR BLD: 28.5 % (ref 18–48)
MCH RBC QN AUTO: 27.8 PG (ref 27–31)
MCHC RBC AUTO-ENTMCNC: 31.1 G/DL (ref 32–36)
MCV RBC AUTO: 89 FL (ref 82–98)
MONOCYTES # BLD AUTO: 0.5 K/UL (ref 0.3–1)
MONOCYTES NFR BLD: 13.4 % (ref 4–15)
NEUTROPHILS # BLD AUTO: 2.3 K/UL (ref 1.8–7.7)
NEUTROPHILS NFR BLD: 56.8 % (ref 38–73)
NRBC BLD-RTO: 0 /100 WBC
PLATELET # BLD AUTO: 305 K/UL (ref 150–450)
PMV BLD AUTO: 8.8 FL (ref 9.2–12.9)
POTASSIUM SERPL-SCNC: 4.8 MMOL/L (ref 3.5–5.1)
PROT SERPL-MCNC: 6.7 G/DL (ref 6–8.4)
RBC # BLD AUTO: 4.17 M/UL (ref 4–5.4)
SATURATED IRON: 8 % (ref 20–50)
SODIUM SERPL-SCNC: 137 MMOL/L (ref 136–145)
TOTAL IRON BINDING CAPACITY: 391 UG/DL (ref 250–450)
TRANSFERRIN SERPL-MCNC: 264 MG/DL (ref 200–375)
WBC # BLD AUTO: 3.97 K/UL (ref 3.9–12.7)

## 2024-11-14 PROCEDURE — 84466 ASSAY OF TRANSFERRIN: CPT | Performed by: INTERNAL MEDICINE

## 2024-11-14 PROCEDURE — 80053 COMPREHEN METABOLIC PANEL: CPT | Performed by: INTERNAL MEDICINE

## 2024-11-14 PROCEDURE — 82728 ASSAY OF FERRITIN: CPT | Performed by: INTERNAL MEDICINE

## 2024-11-14 PROCEDURE — 36415 COLL VENOUS BLD VENIPUNCTURE: CPT | Performed by: INTERNAL MEDICINE

## 2024-11-14 PROCEDURE — 85025 COMPLETE CBC W/AUTO DIFF WBC: CPT | Performed by: INTERNAL MEDICINE

## 2024-11-22 ENCOUNTER — PATIENT MESSAGE (OUTPATIENT)
Dept: RESEARCH | Facility: HOSPITAL | Age: 49
End: 2024-11-22
Payer: COMMERCIAL

## 2024-12-03 ENCOUNTER — OFFICE VISIT (OUTPATIENT)
Dept: HEMATOLOGY/ONCOLOGY | Facility: CLINIC | Age: 49
End: 2024-12-03
Payer: COMMERCIAL

## 2024-12-03 ENCOUNTER — PATIENT MESSAGE (OUTPATIENT)
Dept: OBSTETRICS AND GYNECOLOGY | Facility: CLINIC | Age: 49
End: 2024-12-03
Payer: COMMERCIAL

## 2024-12-03 DIAGNOSIS — N93.9 ABNORMAL UTERINE BLEEDING DUE TO INTRAMURAL LEIOMYOMA: ICD-10-CM

## 2024-12-03 DIAGNOSIS — N92.1 MENORRHAGIA WITH IRREGULAR CYCLE: Primary | ICD-10-CM

## 2024-12-03 DIAGNOSIS — D50.0 IRON DEFICIENCY ANEMIA DUE TO CHRONIC BLOOD LOSS: Primary | ICD-10-CM

## 2024-12-03 DIAGNOSIS — Z67.A1 BENIGN ETHNIC NEUTROPENIA: ICD-10-CM

## 2024-12-03 DIAGNOSIS — D25.1 ABNORMAL UTERINE BLEEDING DUE TO INTRAMURAL LEIOMYOMA: ICD-10-CM

## 2024-12-03 PROCEDURE — 1159F MED LIST DOCD IN RCRD: CPT | Mod: CPTII,95,, | Performed by: INTERNAL MEDICINE

## 2024-12-03 PROCEDURE — 99214 OFFICE O/P EST MOD 30 MIN: CPT | Mod: 95,,, | Performed by: INTERNAL MEDICINE

## 2024-12-03 PROCEDURE — 1160F RVW MEDS BY RX/DR IN RCRD: CPT | Mod: CPTII,95,, | Performed by: INTERNAL MEDICINE

## 2024-12-03 NOTE — PROGRESS NOTES
[facility]     Outpatient Hematology/Medical Oncology Note  Patient: Surinder Elizabeth  MRN: [id number]  Primary Care Provider: No, Primary Doctor  Chief Complaint:  Iron Deficiency Anemia     Subjective     Subjective:   HPI:   Surinder Elizabeth is a 49 y.o. female with PMH significant for:   - Benign Ethnic Neutropenia  - Uterine fibroids    She is followed by Hematology for known iron deficiency anemia presumably from HMB. She presents today for routine follow up.     HPI:  2021 - 2023: increase in baseline HMB; Hb range: 6.4-8.4  3/16/23: TVUS: fibroid uterus - 3.1 cm subcuosal, 5.1 subserosal, 3.1 and 3 cm intramural fibroids   Prior labs: 2023 - folate: 15.9, b12: 795, HIV and hepatitis testing : negative, peripheral smear: mild anisopoikilocytosis, absolute neutropenia, copper: 1692  July 2024 labs: Hgb: 12.4, MCV: 86, WBC: 2900, ANC: 1500, platelets: 327; CMP: wnl, TSAT: 18% (2%), ferritin: 62  -IV iron: Injectafer (8/10/2023) with improvement in Hb to 13.2 (10/2023) and 6/2024     Interval History:    Mrs. Elizabeth overall feels well. Patient's current symptoms are:  (1) Fatigue: hx of intermittent fatigue, stable    (2) HMB: Her periods occur q 30 days, last about 5 days, wears a menstrual disc and changes it q 4 hrs. No significant cramping. No other sites of blood loss, or issues with abnormal bleeding. She is not taking oral iron due to associated significant constipation.  She had a myomectomy on 9/24/24 for large posterior wall fibroid (unclear if fibroid fully removed). Her periods since the procedure have been irregular and painful; had 2 periods in Nov 2024 with clots/bleeding, currently on her period, started on 11/29.     Patient otherwise denies other sites of bleeding, fevers, chills, night sweats, headaches, chest pain, SOB, cough, abdominal pain, N/V, diarrhea, constipation, weight loss, rashes     Review of Systems:  14-point review of systems was asked with the pertinent positives and/or  negatives stated in HPI.     Past Medical History:   - COLTON, Uterine fibroids, Barthlolin's Cyst (2024)     Past Surgical HIstory:   Past Surgical History:   Procedure Laterality Date    HYSTEROSCOPIC RESECTION OF MYOMA N/A 9/24/2024    Procedure: MYOMECTOMY, HYSTEROSCOPIC;  Surgeon: Evita Euceda MD;  Location: [facility];  Service: OB/GYN;  Laterality: N/A;       Family History:   - Father: Prostate Cancer (70s, early stage)  Family History   Problem Relation Name Age of Onset    Hypertension Father      Prostate cancer Father  68    Breast cancer Neg Hx      Colon cancer Neg Hx      Ovarian cancer Neg Hx       Social History:    reports that she has never smoked. She has never used smokeless tobacco. She reports current alcohol use. She reports that she does not use drugs.  - Occupation: singer    Allergies:  Review of patient's allergies indicates:  No Known Allergies    Medications:  Current Outpatient Medications   Medication Sig Dispense Refill    HYDROcodone-acetaminophen (NORCO) 5-325 mg per tablet Take 1 tablet by mouth every 6 (six) hours as needed for Pain. (Patient not taking: Reported on 10/10/2024) 5 tablet 0    ibuprofen (ADVIL,MOTRIN) 800 MG tablet Take 1 tablet (800 mg total) by mouth every 8 (eight) hours as needed for Pain. (Patient not taking: Reported on 10/10/2024) 30 tablet 0    valACYclovir (VALTREX) 1000 MG tablet Take 1 tablet (1,000 mg total) by mouth once daily. For 5 days as needed for outbreaks 30 tablet 5     No current facility-administered medications for this visit.          Objective:   Vitals:   There were no vitals filed for this visit.    BMI: There is no height or weight on file to calculate BMI.    Physical Exam  ECOG Performance Status:    General Appearance:    Alert, cooperative, no distress   VIRTUAL VISIT     Laboratory Data:  No visits with results within 1 Week(s) from this visit.   Latest known visit with results is:   Lab Visit on 11/14/2024   Component Date  Value Ref Range Status    WBC 11/14/2024 3.97  3.90 - 12.70 K/uL Final    RBC 11/14/2024 4.17  4.00 - 5.40 M/uL Final    Hemoglobin 11/14/2024 11.6 (L)  12.0 - 16.0 g/dL Final    Hematocrit 11/14/2024 37.3  37.0 - 48.5 % Final    MCV 11/14/2024 89  82 - 98 fL Final    MCH 11/14/2024 27.8  27.0 - 31.0 pg Final    MCHC 11/14/2024 31.1 (L)  32.0 - 36.0 g/dL Final    RDW 11/14/2024 14.1  11.5 - 14.5 % Final    Platelets 11/14/2024 305  150 - 450 K/uL Final    MPV 11/14/2024 8.8 (L)  9.2 - 12.9 fL Final    Immature Granulocytes 11/14/2024 0.3  0.0 - 0.5 % Final    Gran # (ANC) 11/14/2024 2.3  1.8 - 7.7 K/uL Final    Immature Grans (Abs) 11/14/2024 0.01  0.00 - 0.04 K/uL Final    Comment: Mild elevation in immature granulocytes is non specific and   can be seen in a variety of conditions including stress response,   acute inflammation, trauma and pregnancy. Correlation with other   laboratory and clinical findings is essential.      Lymph # 11/14/2024 1.1  1.0 - 4.8 K/uL Final    Mono # 11/14/2024 0.5  0.3 - 1.0 K/uL Final    Eos # 11/14/2024 0.0  0.0 - 0.5 K/uL Final    Baso # 11/14/2024 0.02  0.00 - 0.20 K/uL Final    nRBC 11/14/2024 0  0 /100 WBC Final    Gran % 11/14/2024 56.8  38.0 - 73.0 % Final    Lymph % 11/14/2024 28.5  18.0 - 48.0 % Final    Mono % 11/14/2024 13.4  4.0 - 15.0 % Final    Eosinophil % 11/14/2024 0.5  0.0 - 8.0 % Final    Basophil % 11/14/2024 0.5  0.0 - 1.9 % Final    Differential Method 11/14/2024 Automated   Final    Sodium 11/14/2024 137  136 - 145 mmol/L Final    Potassium 11/14/2024 4.8  3.5 - 5.1 mmol/L Final    Chloride 11/14/2024 105  95 - 110 mmol/L Final    CO2 11/14/2024 26  23 - 29 mmol/L Final    Glucose 11/14/2024 90  70 - 110 mg/dL Final    BUN 11/14/2024 10  6 - 20 mg/dL Final    Creatinine 11/14/2024 0.7  0.5 - 1.4 mg/dL Final    Calcium 11/14/2024 9.3  8.7 - 10.5 mg/dL Final    Total Protein 11/14/2024 6.7  6.0 - 8.4 g/dL Final    Albumin 11/14/2024 3.9  3.5 - 5.2 g/dL Final     Total Bilirubin 2024 0.3  0.1 - 1.0 mg/dL Final    Comment: For infants and newborns, interpretation of results should be based  on gestational age, weight and in agreement with clinical  observations.    Premature Infant recommended reference ranges:  Up to 24 hours.............<8.0 mg/dL  Up to 48 hours............<12.0 mg/dL  3-5 days..................<15.0 mg/dL  6-29 days.................<15.0 mg/dL      Alkaline Phosphatase 2024 59  40 - 150 U/L Final    AST 2024 22  10 - 40 U/L Final    ALT 2024 20  10 - 44 U/L Final    eGFR 2024 >60  >60 mL/min/1.73 m^2 Final    Anion Gap 2024 6 (L)  8 - 16 mmol/L Final    Ferritin 2024 8 (L)  20.0 - 300.0 ng/mL Final    Iron 2024 32  30 - 160 ug/dL Final    Transferrin 2024 264  200 - 375 mg/dL Final    TIBC 2024 391  250 - 450 ug/dL Final    Saturated Iron 2024 8 (L)  20 - 50 % Final       Imagin24: TVUS: multiple fibroids - 3 cm submucosal fibroid in posterior uterus, 6 and 4 cm intramural fibroid in posterior uterus, endometrial wnl    Assessment   Assessment and Plan:   Surinder Elizabeth is a 49 y.o. female with Benign Ethnic Neutropenia and Uterine Fibroids. She is followed by Hematology for known iron deficiency anemia presumably from HMB. She presents today for follow up. She last received IV iron with injectafer on  and 2024.     # Iron Deficiency Anemia   - Last labs: 2024 - CBC: Hgb: 11.6 (12.1 2024; 7.5 in 2024), MCV: 89, WBC: 3, platelets: 305, CMP: wnl, TSAT: 8%, ferritin: 8    - Labs indicate iron deficiency with ferritin <30 and TSAT <20%  - The likely cause of her iron deficiency is from blood loss from menstruation; last colonoscopy reportedly negative as below  - Given significant continued bleeding and increased frequency of her periods, discussed additional IV iron. Patient elects to proceed     PLAN:   - IV injectafer   - Blood transfusion for Hb <7. ED precautions  discussed.   - Resume PO iron (ferrous sulfate 65 mg PO MWF) if tolerated; add laxative PRN constipation   - Avoid NSAIDs   - F/u gynecology for AUB     # Other Co-Morbidities  - AUB: known fibroids s/p myomectomy on 24, pt following closely with gynecology  - Neutropenia: presumably COLTON ie normal variant, folate: 15.9, b12: 795, HIV and hepatitis testing : negative, peripheral smear: mild anisopoikilocytosis, absolute neutropenia, copper: 1692 - CTM  - Cancer Screening: Colonoscopy: 2023 (1 cm rectal hyperplastic polyp) - performed outside of [facility], Pap:  (hpv: neg, pap: wnl), MM2024 - encouraged PCP f/u    Follow Up:  - IV injectafer  - RTC in 3 months with labs prior (repeat hb electrophoresis once iron deficiency corrected) - labs sooner if bleeding continues      Med Onc Chart Routing      Follow up with physician . RTC in 3 months as a virtual visit with labs a few days prior to appointment   Follow up with EMA    Infusion scheduling note    Injection scheduling note    Labs   Scheduling:  Preferred lab:  Lab interval:  CBC, CMP, ferritin, iron profile   Imaging    Pharmacy appointment    Other referrals              # Treatment Plan  - Medication: IV Injectafer  - Dose: 750 mg IV once and second dose of 750 mg IV once after 7 days  - Monitoring: Baseline CBC/Iron Profile/Ferritin and CBC/Ferritin in 4 to 8 weeks post infusion to assess response   - Side effects reviewed. Discussed IV iron is typically overall well tolerated but risks include and are not limited to hypersensitivity rxns, hypotension, GI toxicity, HA, myalgias, multisystem organ failure, infection, life threatening side effects including shock. Risks, benefits, alternatives discussed. No further questions. Patient elects to proceed    MDM includes:    - Acute or chronic illness or injury that poses a threat to life or bodily function  - Review of prior external notes from unique source  - Independent review and explanation of  3+ results from unique tests   - Discussion of management and ordering 3+ unique tests   - Extensive discussion of treatment and management  - Prescription drug management  - Drug therapy requiring intensive monitoring for toxicity    - Overall, I discussed the diagnosis, history, stage, labs/imaging, prognosis, management, and treatment plan as applicable. I reviewed adverse short and long term effects as applicable.   - Informed patient if symptoms are getting worse that it is their responsibility to call the clinic and schedule follow up sooner than stated follow up. Also informed patient if they do not hear from the appointment center in 2-5 business days for their referrals, the patient must call the Oncology clinic so we can follow up on procedures or referral scheduling. Patient was fully informed of current medical plan, all questions were answered and patient verbalized understanding. No further questions.   - Face to Face Visit time I spent with the patient: 30 minutes of total time spent on the encounter, including counseling patient and/or coordinating care, which includes face to face time and non-face to face time preparing to see the patient (eg, review of tests), Obtaining and/or reviewing separately obtained history, Documenting clinical information in the electronic or other health record, Independently interpreting results (not separately reported) and communicating results to the patient/family/caregiver, or Care coordination (not separately reported).   -Telephone/Video Visit: This is a telephone/video visit that took place. The patient location is: home. The patient understanding of these limitations and that there is an increased risk of incomplete diagnosis and potential for incomplete or inaccurate discussion of treatment options. With that understanding , they are instructed that if their problem isn't resolving they will schedule an in person visit. Each patient to whom he or she provides  medical services by telemedicine is:  (1) informed of the relationship between the physician and patient and the respective role of any other health care provider with respect to management of the patient; and (2) notified that he or she may decline to receive medical services by telemedicine and may withdraw from such care at any time.    Signed   Beatrice Lester MD  [facility]

## 2024-12-03 NOTE — Clinical Note
Hi team, Please place patient on recall list for virtual visit in March 2025 with labs a few days prior. Thanks!

## 2024-12-03 NOTE — Clinical Note
Hi team,  Please help me set up IV injectafer x 2, last received in June 2024. Let me know if I need to replace any orders/do insurance auth, etc. Thanks!

## 2024-12-04 RX ORDER — SODIUM CHLORIDE 0.9 % (FLUSH) 0.9 %
10 SYRINGE (ML) INJECTION
OUTPATIENT
Start: 2024-12-04

## 2024-12-04 RX ORDER — DIPHENHYDRAMINE HYDROCHLORIDE 50 MG/ML
50 INJECTION INTRAMUSCULAR; INTRAVENOUS ONCE AS NEEDED
OUTPATIENT
Start: 2024-12-04 | End: 2036-05-01

## 2024-12-04 RX ORDER — HEPARIN 100 UNIT/ML
500 SYRINGE INTRAVENOUS
OUTPATIENT
Start: 2024-12-18

## 2024-12-04 RX ORDER — EPINEPHRINE 0.3 MG/.3ML
0.3 INJECTION SUBCUTANEOUS ONCE AS NEEDED
OUTPATIENT
Start: 2024-12-04 | End: 2036-05-01

## 2024-12-04 RX ORDER — SODIUM CHLORIDE 0.9 % (FLUSH) 0.9 %
10 SYRINGE (ML) INJECTION
OUTPATIENT
Start: 2024-12-18

## 2024-12-04 RX ORDER — HEPARIN 100 UNIT/ML
500 SYRINGE INTRAVENOUS
OUTPATIENT
Start: 2024-12-04

## 2024-12-04 RX ORDER — DIPHENHYDRAMINE HYDROCHLORIDE 50 MG/ML
50 INJECTION INTRAMUSCULAR; INTRAVENOUS ONCE AS NEEDED
OUTPATIENT
Start: 2024-12-18 | End: 2036-05-15

## 2024-12-04 RX ORDER — EPINEPHRINE 0.3 MG/.3ML
0.3 INJECTION SUBCUTANEOUS ONCE AS NEEDED
OUTPATIENT
Start: 2024-12-18 | End: 2036-05-15

## 2024-12-06 ENCOUNTER — HOSPITAL ENCOUNTER (OUTPATIENT)
Dept: RADIOLOGY | Facility: OTHER | Age: 49
Discharge: HOME | End: 2024-12-06
Attending: OBSTETRICS & GYNECOLOGY
Payer: COMMERCIAL

## 2024-12-06 DIAGNOSIS — N92.1 MENORRHAGIA WITH IRREGULAR CYCLE: ICD-10-CM

## 2024-12-06 PROCEDURE — 76830 TRANSVAGINAL US NON-OB: CPT | Mod: 26,,, | Performed by: RADIOLOGY

## 2024-12-06 PROCEDURE — 76856 US EXAM PELVIC COMPLETE: CPT | Mod: TC

## 2024-12-06 PROCEDURE — 76856 US EXAM PELVIC COMPLETE: CPT | Mod: 26,,, | Performed by: RADIOLOGY

## 2024-12-07 ENCOUNTER — PATIENT MESSAGE (OUTPATIENT)
Dept: OBSTETRICS AND GYNECOLOGY | Facility: CLINIC | Age: 49
End: 2024-12-07
Payer: COMMERCIAL

## 2024-12-12 NOTE — TELEPHONE ENCOUNTER
Pt accepts February 11th surgery date. Pt wants to know if she needs another MRI before surgery. Please advise.

## 2024-12-13 DIAGNOSIS — D25.1 FIBROIDS, INTRAMURAL: Primary | ICD-10-CM

## 2024-12-13 RX ORDER — ONDANSETRON 8 MG/1
8 TABLET, ORALLY DISINTEGRATING ORAL EVERY 8 HOURS PRN
OUTPATIENT
Start: 2024-12-13

## 2024-12-13 RX ORDER — HYDROCODONE BITARTRATE AND ACETAMINOPHEN 5; 325 MG/1; MG/1
1 TABLET ORAL EVERY 4 HOURS PRN
OUTPATIENT
Start: 2024-12-13

## 2024-12-13 RX ORDER — ACETAMINOPHEN 325 MG/1
650 TABLET ORAL EVERY 4 HOURS PRN
OUTPATIENT
Start: 2024-12-13

## 2024-12-13 RX ORDER — KETOROLAC TROMETHAMINE 30 MG/ML
30 INJECTION, SOLUTION INTRAMUSCULAR; INTRAVENOUS
OUTPATIENT
Start: 2024-12-13 | End: 2024-12-14

## 2024-12-13 RX ORDER — SODIUM CHLORIDE 0.9 % (FLUSH) 0.9 %
3 SYRINGE (ML) INJECTION
OUTPATIENT
Start: 2024-12-13

## 2024-12-13 RX ORDER — ONDANSETRON HYDROCHLORIDE 2 MG/ML
4 INJECTION, SOLUTION INTRAVENOUS DAILY PRN
OUTPATIENT
Start: 2024-12-13

## 2024-12-13 RX ORDER — HYDROMORPHONE HYDROCHLORIDE 1 MG/ML
0.2 INJECTION, SOLUTION INTRAMUSCULAR; INTRAVENOUS; SUBCUTANEOUS EVERY 5 MIN PRN
OUTPATIENT
Start: 2024-12-13

## 2024-12-13 RX ORDER — DIPHENHYDRAMINE HYDROCHLORIDE 50 MG/ML
25 INJECTION INTRAMUSCULAR; INTRAVENOUS EVERY 4 HOURS PRN
OUTPATIENT
Start: 2024-12-13

## 2024-12-13 RX ORDER — IBUPROFEN 400 MG/1
800 TABLET ORAL
OUTPATIENT
Start: 2024-12-14

## 2024-12-13 RX ORDER — LIDOCAINE HYDROCHLORIDE 10 MG/ML
0.5 INJECTION, SOLUTION EPIDURAL; INFILTRATION; INTRACAUDAL; PERINEURAL
OUTPATIENT
Start: 2024-12-13

## 2024-12-13 RX ORDER — MUPIROCIN 20 MG/G
OINTMENT TOPICAL
OUTPATIENT
Start: 2024-12-13

## 2024-12-13 RX ORDER — HYDROCODONE BITARTRATE AND ACETAMINOPHEN 10; 325 MG/1; MG/1
1 TABLET ORAL EVERY 4 HOURS PRN
OUTPATIENT
Start: 2024-12-13

## 2024-12-13 RX ORDER — DIPHENHYDRAMINE HCL 25 MG
25 CAPSULE ORAL EVERY 4 HOURS PRN
OUTPATIENT
Start: 2024-12-13

## 2024-12-13 RX ORDER — PROCHLORPERAZINE EDISYLATE 5 MG/ML
5 INJECTION INTRAMUSCULAR; INTRAVENOUS EVERY 6 HOURS PRN
OUTPATIENT
Start: 2024-12-13

## 2024-12-13 RX ORDER — AMOXICILLIN 250 MG
1 CAPSULE ORAL 2 TIMES DAILY
OUTPATIENT
Start: 2024-12-13

## 2024-12-13 RX ORDER — FAMOTIDINE 20 MG/1
20 TABLET, FILM COATED ORAL
OUTPATIENT
Start: 2024-12-13

## 2024-12-13 RX ORDER — MEPERIDINE HYDROCHLORIDE 50 MG/ML
12.5 INJECTION INTRAMUSCULAR; INTRAVENOUS; SUBCUTANEOUS ONCE AS NEEDED
OUTPATIENT
Start: 2024-12-13 | End: 2024-12-14

## 2024-12-13 RX ORDER — ACETAMINOPHEN 500 MG
1000 TABLET ORAL
OUTPATIENT
Start: 2024-12-13

## 2024-12-13 RX ORDER — HYDROMORPHONE HYDROCHLORIDE 1 MG/ML
1 INJECTION, SOLUTION INTRAMUSCULAR; INTRAVENOUS; SUBCUTANEOUS EVERY 4 HOURS PRN
OUTPATIENT
Start: 2024-12-13

## 2024-12-13 RX ORDER — PROCHLORPERAZINE EDISYLATE 5 MG/ML
5 INJECTION INTRAMUSCULAR; INTRAVENOUS EVERY 10 MIN PRN
OUTPATIENT
Start: 2024-12-13

## 2024-12-13 RX ORDER — CELECOXIB 100 MG/1
400 CAPSULE ORAL
OUTPATIENT
Start: 2024-12-13

## 2024-12-13 RX ORDER — PREGABALIN 50 MG/1
50 CAPSULE ORAL
OUTPATIENT
Start: 2024-12-13

## 2024-12-13 RX ORDER — CEFAZOLIN SODIUM 2 G/50ML
2 SOLUTION INTRAVENOUS
OUTPATIENT
Start: 2024-12-13

## 2024-12-13 RX ORDER — POLYETHYLENE GLYCOL 3350 17 G/17G
17 POWDER, FOR SOLUTION ORAL DAILY
OUTPATIENT
Start: 2024-12-13

## 2024-12-13 RX ORDER — SODIUM CHLORIDE, SODIUM LACTATE, POTASSIUM CHLORIDE, CALCIUM CHLORIDE 600; 310; 30; 20 MG/100ML; MG/100ML; MG/100ML; MG/100ML
INJECTION, SOLUTION INTRAVENOUS CONTINUOUS
OUTPATIENT
Start: 2024-12-13

## 2024-12-16 ENCOUNTER — OFFICE VISIT (OUTPATIENT)
Dept: OBSTETRICS AND GYNECOLOGY | Facility: CLINIC | Age: 49
End: 2024-12-16
Payer: COMMERCIAL

## 2024-12-16 VITALS
BODY MASS INDEX: 19.78 KG/M2 | SYSTOLIC BLOOD PRESSURE: 120 MMHG | DIASTOLIC BLOOD PRESSURE: 66 MMHG | WEIGHT: 130.06 LBS

## 2024-12-16 DIAGNOSIS — D25.1 FIBROIDS, INTRAMURAL: Primary | ICD-10-CM

## 2024-12-16 DIAGNOSIS — B96.89 BACTERIAL VAGINOSIS: ICD-10-CM

## 2024-12-16 DIAGNOSIS — N76.0 BACTERIAL VAGINOSIS: ICD-10-CM

## 2024-12-16 DIAGNOSIS — N92.1 MENORRHAGIA WITH IRREGULAR CYCLE: ICD-10-CM

## 2024-12-16 PROCEDURE — 0352U VAGINOSIS SCREEN BY DNA PROBE: CPT | Performed by: OBSTETRICS & GYNECOLOGY

## 2024-12-16 PROCEDURE — 3008F BODY MASS INDEX DOCD: CPT | Mod: CPTII,S$GLB,, | Performed by: OBSTETRICS & GYNECOLOGY

## 2024-12-16 PROCEDURE — 3074F SYST BP LT 130 MM HG: CPT | Mod: CPTII,S$GLB,, | Performed by: OBSTETRICS & GYNECOLOGY

## 2024-12-16 PROCEDURE — 3078F DIAST BP <80 MM HG: CPT | Mod: CPTII,S$GLB,, | Performed by: OBSTETRICS & GYNECOLOGY

## 2024-12-16 PROCEDURE — 99999 PR PBB SHADOW E&M-EST. PATIENT-LVL III: CPT | Mod: PBBFAC,,, | Performed by: OBSTETRICS & GYNECOLOGY

## 2024-12-16 PROCEDURE — 1159F MED LIST DOCD IN RCRD: CPT | Mod: CPTII,S$GLB,, | Performed by: OBSTETRICS & GYNECOLOGY

## 2024-12-16 PROCEDURE — 99214 OFFICE O/P EST MOD 30 MIN: CPT | Mod: S$GLB,,, | Performed by: OBSTETRICS & GYNECOLOGY

## 2024-12-16 PROCEDURE — 1160F RVW MEDS BY RX/DR IN RCRD: CPT | Mod: CPTII,S$GLB,, | Performed by: OBSTETRICS & GYNECOLOGY

## 2024-12-16 RX ORDER — NORGESTIMATE AND ETHINYL ESTRADIOL 0.25-0.035
1 KIT ORAL DAILY
Qty: 30 TABLET | Refills: 11 | Status: SHIPPED | OUTPATIENT
Start: 2024-12-16 | End: 2025-12-16

## 2024-12-16 RX ORDER — METRONIDAZOLE 7.5 MG/G
1 GEL VAGINAL DAILY
Qty: 70 G | Refills: 0 | Status: SHIPPED | OUTPATIENT
Start: 2024-12-16 | End: 2024-12-16

## 2024-12-16 NOTE — PROGRESS NOTES
Chief Complaint   Patient presents with    Vaginal Bleeding       HPI:   49 y.o.  here today for follow up of AUB. First two days of cycles having significant pain. Had a [facility] [place]ome[place] in September, bleeding was less immediately after but since has gotten significantly heavier. Has been experiencing a vaginal odor during cycles. Has had two menstrual cycles in the month of November. Has been bleeding since the . Two days of very heavy and very painful bleeding.     [facility] Myomectomy path: Fragments of benign endometrium and underlying smooth muscle proliferation compatible with a submucosal leiomyoma     LMP Dates from Last 1 Encounters:   LMP: 2024       Labs / Significant Studies  Pap (2024): NILM/HPV neg       Past Medical History:   Diagnosis Date    Anemia, unspecified     Breast lump in female     Herpes simplex virus (HSV) infection     HSV2.     Past Surgical History:   Procedure Laterality Date    HYSTEROSCOPIC RESECTION OF MYOMA N/A 2024    Procedure: MYOMECTOMY, HYSTEROSCOPIC;  Surgeon: Evita Euceda MD;  Location: [facility];  Service: OB/GYN;  Laterality: N/A;       Current Outpatient Medications:     ibuprofen (ADVIL,MOTRIN) 800 MG tablet, Take 1 tablet (800 mg total) by mouth every 8 (eight) hours as needed for Pain., Disp: 30 tablet, Rfl: 0    valACYclovir (VALTREX) 1000 MG tablet, Take 1 tablet (1,000 mg total) by mouth once daily. For 5 days as needed for outbreaks, Disp: 30 tablet, Rfl: 5    HYDROcodone-acetaminophen (NORCO) 5-325 mg per tablet, Take 1 tablet by mouth every 6 (six) hours as needed for Pain. (Patient not taking: Reported on 2024), Disp: 5 tablet, Rfl: 0  Review of patient's allergies indicates:  No Known Allergies  OB History    Para Term  AB Living   0 0 0 0 0 0   SAB IAB Ectopic Multiple Live Births   0 0 0 0 0     Social History     Tobacco Use    Smoking status: Never    Smokeless tobacco: Never   Substance Use Topics     Alcohol use: Yes     Comment: Socially.    Drug use: No     Family History   Problem Relation Name Age of Onset    Hypertension Father      Prostate cancer Father  68    Breast cancer Neg Hx      Colon cancer Neg Hx      Ovarian cancer Neg Hx         Review of Systems   Negative except as in HPI    Physical Exam   Vitals:    12/16/24 1543   BP: 120/66     Body mass index is 19.78 kg/m².    Physical Exam  Constitutional:       General: She is not in acute distress.     Appearance: Normal appearance.   HENT:      Head: Normocephalic and atraumatic.   Eyes:      Extraocular Movements: Extraocular movements intact.      Pupils: Pupils are equal, round, and reactive to light.   Pulmonary:      Effort: Pulmonary effort is normal.   Musculoskeletal:         General: Normal range of motion.      Cervical back: Normal range of motion.   Neurological:      General: No focal deficit present.      Mental Status: She is alert and oriented to person, place, and time.   Skin:     General: Skin is warm and dry.   Psychiatric:         Mood and Affect: Mood normal.         Behavior: Behavior normal.         Thought Content: Thought content normal.   Vitals reviewed.          Labs reviewed:    ASSESSMENT:   Patient Active Problem List   Diagnosis    Iron deficiency anemia due to chronic blood loss    Bartholin cyst    Bartholin's gland abscess    Chronic right shoulder pain    Dysphonia    Vocal cord nodule    Vocal fold edema    Uterine leiomyoma    Abnormal uterine bleeding due to intramural leiomyoma    HSV (herpes simplex virus) anogenital infection    Benign ethnic neutropenia    Status hysteroscopic myomectomy       PLAN:  Problem List Items Addressed This Visit    None       Total time spent on this encounter was ***.  This includes preparing to see the patient;  obtaining/reviewing separately obtained history;  performing a medical exam and/or evaluation;   counseling/educating the patient;  ordering medications, tests, of  procedures;   referring/communicating with other health care professionals;  EMR documentation;  interpreting/communicating results to the patient;  and/or care coordination.    No follow-ups on file.       Madeline Shin MD  Department of Obstetrics & Gynecology  [facility]         spent on this encounter was 29 minutes.  This includes preparing to see the patient;  obtaining/reviewing separately obtained history;  performing a medical exam and/or evaluation;   counseling/educating the patient;  ordering medications, tests, of procedures;   referring/communicating with other health care professionals;  EMR documentation;  interpreting/communicating results to the patient;  and/or care coordination.    Follow up if symptoms worsen or fail to improve.       Madeline Shin MD  Department of Obstetrics & Gynecology  [facility]

## 2024-12-19 LAB
BACTERIAL VAGINOSIS DNA: NOT DETECTED
CANDIDA GLABRATA/KRUSEI: NOT DETECTED
CANDIDA RRNA VAG QL PROBE: NOT DETECTED
TRICHOMONAS VAGINALIS: NOT DETECTED

## 2024-12-26 ENCOUNTER — PATIENT MESSAGE (OUTPATIENT)
Dept: HEMATOLOGY/ONCOLOGY | Facility: CLINIC | Age: 49
End: 2024-12-26
Payer: COMMERCIAL

## 2024-12-26 ENCOUNTER — PATIENT MESSAGE (OUTPATIENT)
Dept: OBSTETRICS AND GYNECOLOGY | Facility: CLINIC | Age: 49
End: 2024-12-26
Payer: COMMERCIAL

## 2024-12-30 ENCOUNTER — TELEPHONE (OUTPATIENT)
Dept: HEMATOLOGY/ONCOLOGY | Facility: CLINIC | Age: 49
End: 2024-12-30
Payer: COMMERCIAL

## 2024-12-30 DIAGNOSIS — N93.9 ABNORMAL UTERINE BLEEDING DUE TO INTRAMURAL LEIOMYOMA: ICD-10-CM

## 2024-12-30 DIAGNOSIS — D50.0 IRON DEFICIENCY ANEMIA DUE TO CHRONIC BLOOD LOSS: Primary | ICD-10-CM

## 2024-12-30 DIAGNOSIS — D25.1 ABNORMAL UTERINE BLEEDING DUE TO INTRAMURAL LEIOMYOMA: ICD-10-CM

## 2024-12-30 DIAGNOSIS — D70.8 OTHER NEUTROPENIA: ICD-10-CM

## 2024-12-30 NOTE — TELEPHONE ENCOUNTER
Called patient to schedule labs and follow up. Appointments were scheduled. Date and time of appointments confirmed.

## 2024-12-31 ENCOUNTER — LAB VISIT (OUTPATIENT)
Dept: LAB | Facility: OTHER | Age: 49
End: 2024-12-31
Attending: INTERNAL MEDICINE
Payer: COMMERCIAL

## 2024-12-31 DIAGNOSIS — D25.1 ABNORMAL UTERINE BLEEDING DUE TO INTRAMURAL LEIOMYOMA: ICD-10-CM

## 2024-12-31 DIAGNOSIS — N93.9 ABNORMAL UTERINE BLEEDING DUE TO INTRAMURAL LEIOMYOMA: ICD-10-CM

## 2024-12-31 DIAGNOSIS — D70.8 OTHER NEUTROPENIA: ICD-10-CM

## 2024-12-31 DIAGNOSIS — D50.0 IRON DEFICIENCY ANEMIA DUE TO CHRONIC BLOOD LOSS: ICD-10-CM

## 2024-12-31 LAB
BASOPHILS # BLD AUTO: 0.03 K/UL (ref 0–0.2)
BASOPHILS NFR BLD: 0.6 % (ref 0–1.9)
DIFFERENTIAL METHOD BLD: ABNORMAL
EOSINOPHIL # BLD AUTO: 0 K/UL (ref 0–0.5)
EOSINOPHIL NFR BLD: 0.4 % (ref 0–8)
ERYTHROCYTE [DISTWIDTH] IN BLOOD BY AUTOMATED COUNT: 13.6 % (ref 11.5–14.5)
FERRITIN SERPL-MCNC: 4 NG/ML (ref 20–300)
HCT VFR BLD AUTO: 29.6 % (ref 37–48.5)
HGB BLD-MCNC: 8.8 G/DL (ref 12–16)
IMM GRANULOCYTES # BLD AUTO: 0.01 K/UL (ref 0–0.04)
IMM GRANULOCYTES NFR BLD AUTO: 0.2 % (ref 0–0.5)
IRON SERPL-MCNC: 22 UG/DL (ref 30–160)
LYMPHOCYTES # BLD AUTO: 1.1 K/UL (ref 1–4.8)
LYMPHOCYTES NFR BLD: 23.3 % (ref 18–48)
MCH RBC QN AUTO: 24.6 PG (ref 27–31)
MCHC RBC AUTO-ENTMCNC: 29.7 G/DL (ref 32–36)
MCV RBC AUTO: 83 FL (ref 82–98)
MONOCYTES # BLD AUTO: 0.7 K/UL (ref 0.3–1)
MONOCYTES NFR BLD: 15.5 % (ref 4–15)
NEUTROPHILS # BLD AUTO: 2.9 K/UL (ref 1.8–7.7)
NEUTROPHILS NFR BLD: 60 % (ref 38–73)
NRBC BLD-RTO: 0 /100 WBC
PLATELET # BLD AUTO: 335 K/UL (ref 150–450)
PMV BLD AUTO: 8.6 FL (ref 9.2–12.9)
RBC # BLD AUTO: 3.58 M/UL (ref 4–5.4)
SATURATED IRON: 4 % (ref 20–50)
TOTAL IRON BINDING CAPACITY: 537 UG/DL (ref 250–450)
TRANSFERRIN SERPL-MCNC: 363 MG/DL (ref 200–375)
WBC # BLD AUTO: 4.76 K/UL (ref 3.9–12.7)

## 2024-12-31 PROCEDURE — 82728 ASSAY OF FERRITIN: CPT | Performed by: INTERNAL MEDICINE

## 2024-12-31 PROCEDURE — 84466 ASSAY OF TRANSFERRIN: CPT | Performed by: INTERNAL MEDICINE

## 2024-12-31 PROCEDURE — 85025 COMPLETE CBC W/AUTO DIFF WBC: CPT | Performed by: INTERNAL MEDICINE

## 2024-12-31 PROCEDURE — 36415 COLL VENOUS BLD VENIPUNCTURE: CPT | Performed by: INTERNAL MEDICINE

## 2024-12-31 NOTE — ASSESSMENT & PLAN NOTE
Due to extent of irregular bleeding recommend OCPs for cycle regulation prior to proceeding with laparoscopic myomectomy. Has upcoming appointment scheduled with Dr. Euceda. Continue oral iron supplementation.     Swabs collected for c/o vaginal odor but likely secondary to old blood/continued vaginal bleeding. Recommend VagiBiom pH pre and probiotic vaginal suppositories.

## 2025-01-02 ENCOUNTER — PATIENT MESSAGE (OUTPATIENT)
Dept: HEMATOLOGY/ONCOLOGY | Facility: CLINIC | Age: 50
End: 2025-01-02
Payer: COMMERCIAL

## 2025-01-07 ENCOUNTER — PATIENT MESSAGE (OUTPATIENT)
Dept: OBSTETRICS AND GYNECOLOGY | Facility: CLINIC | Age: 50
End: 2025-01-07
Payer: COMMERCIAL

## 2025-01-07 DIAGNOSIS — N92.1 MENORRHAGIA WITH IRREGULAR CYCLE: ICD-10-CM

## 2025-01-07 DIAGNOSIS — D25.1 FIBROIDS, INTRAMURAL: ICD-10-CM

## 2025-01-07 RX ORDER — NORGESTIMATE AND ETHINYL ESTRADIOL 0.25-0.035
1 KIT ORAL DAILY
Qty: 30 TABLET | Refills: 11 | Status: SHIPPED | OUTPATIENT
Start: 2025-01-07 | End: 2026-01-07

## 2025-01-23 ENCOUNTER — PATIENT MESSAGE (OUTPATIENT)
Dept: OBSTETRICS AND GYNECOLOGY | Facility: CLINIC | Age: 50
End: 2025-01-23
Payer: COMMERCIAL

## 2025-01-23 ENCOUNTER — TELEPHONE (OUTPATIENT)
Dept: HEMATOLOGY/ONCOLOGY | Facility: CLINIC | Age: 50
End: 2025-01-23
Payer: COMMERCIAL

## 2025-01-23 ENCOUNTER — PATIENT MESSAGE (OUTPATIENT)
Dept: HEMATOLOGY/ONCOLOGY | Facility: CLINIC | Age: 50
End: 2025-01-23
Payer: COMMERCIAL

## 2025-01-23 DIAGNOSIS — Z12.31 ENCOUNTER FOR SCREENING MAMMOGRAM FOR MALIGNANT NEOPLASM OF BREAST: Primary | ICD-10-CM

## 2025-01-23 NOTE — TELEPHONE ENCOUNTER
"CALLED INSURANCE.  Provider is not within pt network.     Iron infusion is billable- needs to be processed through [employer].   Will reach to prior auth to have it submitted through [employer].      CPT code provided.   Reference number [id number] for call.       ----- Message from Star sent at 1/23/2025  2:00 PM CST -----  Consult/Advisory    Name Of Caller:  Hellen [Customer Care Rep - [employer]]    Contact Preference?: [phone]    Provider Name: Trevon    What is the nature of the call?: Requesting clarification about prior auth status for an iron injection (unsure about name)    Additional Notes:  "Thank you for all that you do for our patients"  "

## 2025-01-28 ENCOUNTER — TELEPHONE (OUTPATIENT)
Dept: HEMATOLOGY/ONCOLOGY | Facility: CLINIC | Age: 50
End: 2025-01-28
Payer: COMMERCIAL

## 2025-01-30 ENCOUNTER — LAB VISIT (OUTPATIENT)
Dept: LAB | Facility: HOSPITAL | Age: 50
End: 2025-01-30
Attending: OBSTETRICS & GYNECOLOGY
Payer: COMMERCIAL

## 2025-01-30 ENCOUNTER — HOSPITAL ENCOUNTER (OUTPATIENT)
Dept: RADIOLOGY | Facility: OTHER | Age: 50
Discharge: HOME | End: 2025-01-30
Attending: OBSTETRICS & GYNECOLOGY
Payer: COMMERCIAL

## 2025-01-30 ENCOUNTER — OFFICE VISIT (OUTPATIENT)
Dept: OBSTETRICS AND GYNECOLOGY | Facility: CLINIC | Age: 50
End: 2025-01-30
Payer: COMMERCIAL

## 2025-01-30 VITALS — SYSTOLIC BLOOD PRESSURE: 137 MMHG | BODY MASS INDEX: 19.61 KG/M2 | WEIGHT: 129 LBS | DIASTOLIC BLOOD PRESSURE: 84 MMHG

## 2025-01-30 DIAGNOSIS — Z12.31 ENCOUNTER FOR SCREENING MAMMOGRAM FOR MALIGNANT NEOPLASM OF BREAST: ICD-10-CM

## 2025-01-30 DIAGNOSIS — Z01.818 PREOPERATIVE EXAM FOR GYNECOLOGIC SURGERY: ICD-10-CM

## 2025-01-30 DIAGNOSIS — Z01.818 PREOPERATIVE EXAM FOR GYNECOLOGIC SURGERY: Primary | ICD-10-CM

## 2025-01-30 LAB
ABO + RH BLD: NORMAL
BASOPHILS # BLD AUTO: 0.06 K/UL (ref 0–0.2)
BASOPHILS NFR BLD: 1.5 % (ref 0–1.9)
BLD GP AB SCN CELLS X3 SERPL QL: NORMAL
DIFFERENTIAL METHOD BLD: ABNORMAL
EOSINOPHIL # BLD AUTO: 0 K/UL (ref 0–0.5)
EOSINOPHIL NFR BLD: 0.5 % (ref 0–8)
ERYTHROCYTE [DISTWIDTH] IN BLOOD BY AUTOMATED COUNT: 15.3 % (ref 11.5–14.5)
HCT VFR BLD AUTO: 30.9 % (ref 37–48.5)
HGB BLD-MCNC: 8.7 G/DL (ref 12–16)
IMM GRANULOCYTES # BLD AUTO: 0.02 K/UL (ref 0–0.04)
IMM GRANULOCYTES NFR BLD AUTO: 0.5 % (ref 0–0.5)
LYMPHOCYTES # BLD AUTO: 0.9 K/UL (ref 1–4.8)
LYMPHOCYTES NFR BLD: 23.3 % (ref 18–48)
MCH RBC QN AUTO: 21 PG (ref 27–31)
MCHC RBC AUTO-ENTMCNC: 28.2 G/DL (ref 32–36)
MCV RBC AUTO: 75 FL (ref 82–98)
MONOCYTES # BLD AUTO: 0.7 K/UL (ref 0.3–1)
MONOCYTES NFR BLD: 16.6 % (ref 4–15)
NEUTROPHILS # BLD AUTO: 2.3 K/UL (ref 1.8–7.7)
NEUTROPHILS NFR BLD: 57.6 % (ref 38–73)
NRBC BLD-RTO: 0 /100 WBC
PLATELET # BLD AUTO: 406 K/UL (ref 150–450)
PMV BLD AUTO: 9 FL (ref 9.2–12.9)
RBC # BLD AUTO: 4.14 M/UL (ref 4–5.4)
WBC # BLD AUTO: 4.03 K/UL (ref 3.9–12.7)

## 2025-01-30 PROCEDURE — 77067 SCR MAMMO BI INCL CAD: CPT | Mod: 26,,, | Performed by: RADIOLOGY

## 2025-01-30 PROCEDURE — 85025 COMPLETE CBC W/AUTO DIFF WBC: CPT | Performed by: OBSTETRICS & GYNECOLOGY

## 2025-01-30 PROCEDURE — 86900 BLOOD TYPING SEROLOGIC ABO: CPT | Performed by: OBSTETRICS & GYNECOLOGY

## 2025-01-30 PROCEDURE — 77067 SCR MAMMO BI INCL CAD: CPT | Mod: TC

## 2025-01-30 PROCEDURE — 99999 PR PBB SHADOW E&M-EST. PATIENT-LVL III: CPT | Mod: PBBFAC,,, | Performed by: OBSTETRICS & GYNECOLOGY

## 2025-01-30 PROCEDURE — 99499 UNLISTED E&M SERVICE: CPT | Mod: S$GLB,,, | Performed by: OBSTETRICS & GYNECOLOGY

## 2025-01-30 PROCEDURE — 77063 BREAST TOMOSYNTHESIS BI: CPT | Mod: 26,,, | Performed by: RADIOLOGY

## 2025-01-30 PROCEDURE — 36415 COLL VENOUS BLD VENIPUNCTURE: CPT | Performed by: OBSTETRICS & GYNECOLOGY

## 2025-01-30 NOTE — PROGRESS NOTES
CC: Preop exam    Surinder Elizabeth is a 49 y.o. female  presents for a pre-op exam for a TLH, BS, cystoscopy scheduled on 2025.    She reports cycles are heavy which is causing anemia. She has received IV iron. On MRI, she was found to have a submucosal fibroid. A hysteroscopic myomectomy was attempted but could not be completed as fibroid was very hard. Patient has monitored symptoms and cycles are again heavy.       Ultrasound:    FINDINGS:  Uterus measures 9.6 x 5.9 x 8.5 cm, the endometrium 8 mm with a small amount of endometrial fluid.     Right ovary measures 2.6 x 1.2 x 2.4 cm, RI 0.6.     Left ovary measures 3.5 x 1.8 x 4.4 cm, RI 0.49.     There are 4 fibroids measuring 3.8, 5.8, 4.1, and 2.5 cm.     There is a hyperechoic area in the endometrium possibly a calcification.     Impression:     Four uterine leiomyomas.  No acute process seen.       Past Medical History:   Diagnosis Date    Anemia, unspecified     Breast lump in female     Herpes simplex virus (HSV) infection     HSV2.       Past Surgical History:   Procedure Laterality Date    HYSTEROSCOPIC RESECTION OF MYOMA N/A 2024    Procedure: MYOMECTOMY, HYSTEROSCOPIC;  Surgeon: Evita Euceda MD;  Location: [facility];  Service: OB/GYN;  Laterality: N/A;       OB History    Para Term  AB Living   0 0 0 0 0 0   SAB IAB Ectopic Multiple Live Births   0 0 0 0 0       Family History   Problem Relation Name Age of Onset    Hypertension Father      Prostate cancer Father  68    Breast cancer Neg Hx      Colon cancer Neg Hx      Ovarian cancer Neg Hx         Social History     Tobacco Use    Smoking status: Never    Smokeless tobacco: Never   Substance Use Topics    Alcohol use: Yes     Comment: Socially.    Drug use: No       /84 (Patient Position: Sitting)   Wt 58.5 kg (128 lb 15.5 oz)   LMP 2025 (Approximate) Comment:  cycle lasted until   BMI 19.61 kg/m²     Current Outpatient Medications on File  Prior to Visit   Medication Sig Dispense Refill    norgestimate-ethinyl estradioL (ORTHO-CYCLEN) 0.25-35 mg-mcg per tablet Take 1 tablet by mouth once daily. 30 tablet 11    valACYclovir (VALTREX) 1000 MG tablet Take 1 tablet (1,000 mg total) by mouth once daily. For 5 days as needed for outbreaks 30 tablet 5    HYDROcodone-acetaminophen (NORCO) 5-325 mg per tablet Take 1 tablet by mouth every 6 (six) hours as needed for Pain. (Patient not taking: Reported on 10/10/2024) 5 tablet 0    ibuprofen (ADVIL,MOTRIN) 800 MG tablet Take 1 tablet (800 mg total) by mouth every 8 (eight) hours as needed for Pain. (Patient not taking: Reported on 1/30/2025) 30 tablet 0     No current facility-administered medications on file prior to visit.        Review of patient's allergies indicates:  No Known Allergies     ROS:  GENERAL: Denies weight gain or weight loss. Feeling well overall.   SKIN: Denies rash or lesions.   HEAD: Denies head injury or headache.   NODES: Denies enlarged lymph nodes.   CHEST: Denies chest pain or shortness of breath.   CARDIOVASCULAR: Denies palpitations or left sided chest pain.   ABDOMEN: No abdominal pain, constipation, diarrhea, nausea, vomiting or rectal bleeding.   URINARY: No frequency, dysuria, hematuria, or burning on urination.  REPRODUCTIVE: See HPI.   HEMATOLOGIC: No easy bruisability or excessive bleeding with the exception of menstrual cycles.  MUSCULOSKELETAL: Denies joint pain or swelling.   NEUROLOGIC: Denies syncope or weakness.   PSYCHIATRIC: Denies depression, anxiety or mood swings.    PHYSICAL EXAM:  APPEARANCE: Well nourished, well developed, in no acute distress.  AFFECT: WNL, alert and oriented x 3  SKIN: No acne or hirsutism  NECK: Neck symmetric without masses or thyromegaly  NODES: No inguinal, cervical, axillary, or femoral lymph node enlargement  CHEST: Good respiratory effect  ABDOMEN: Soft.  No tenderness or masses.  No hepatosplenomegaly.  No hernias.  PELVIC:  Deferred  EXTREMITIES: No edema.      ICD-10-CM ICD-9-CM    1. Preoperative exam for gynecologic surgery  Z01.818 V72.83 TYPE AND SCREEN PREOP      CBC auto differential          Discussed treatment options with patient - continued expectant management, repeat hysteroscopy, DVM, hysterectomy. She desires hysterectomy. Recommended a laparoscopic approach to achieve a minimally invasive route. Recommended ovarian conservation given her age.     Surgical risks discussed including but not limited to risk of bleeding, infection, injury to adjacent organs, and laparotomy.      I have discussed the risks, benefits, indications, and alternatives of the procedure in detail.  The patient verbalizes her understanding.  All questions answered.  Consents signed.  The patient agrees to proceed to proceed as planned.

## 2025-01-30 NOTE — H&P (VIEW-ONLY)
CC: Preop exam    Surinder Elizabeth is a 49 y.o. female  presents for a pre-op exam for a TLH, BS, cystoscopy scheduled on 2025.    She reports cycles are heavy which is causing anemia. She has received IV iron. On MRI, she was found to have a submucosal fibroid. A hysteroscopic myomectomy was attempted but could not be completed as fibroid was very hard. Patient has monitored symptoms and cycles are again heavy.       Ultrasound:    FINDINGS:  Uterus measures 9.6 x 5.9 x 8.5 cm, the endometrium 8 mm with a small amount of endometrial fluid.     Right ovary measures 2.6 x 1.2 x 2.4 cm, RI 0.6.     Left ovary measures 3.5 x 1.8 x 4.4 cm, RI 0.49.     There are 4 fibroids measuring 3.8, 5.8, 4.1, and 2.5 cm.     There is a hyperechoic area in the endometrium possibly a calcification.     Impression:     Four uterine leiomyomas.  No acute process seen.       Past Medical History:   Diagnosis Date    Anemia, unspecified     Breast lump in female     Herpes simplex virus (HSV) infection     HSV2.       Past Surgical History:   Procedure Laterality Date    HYSTEROSCOPIC RESECTION OF MYOMA N/A 2024    Procedure: MYOMECTOMY, HYSTEROSCOPIC;  Surgeon: Evita Euceda MD;  Location: [facility];  Service: OB/GYN;  Laterality: N/A;       OB History    Para Term  AB Living   0 0 0 0 0 0   SAB IAB Ectopic Multiple Live Births   0 0 0 0 0       Family History   Problem Relation Name Age of Onset    Hypertension Father      Prostate cancer Father  68    Breast cancer Neg Hx      Colon cancer Neg Hx      Ovarian cancer Neg Hx         Social History     Tobacco Use    Smoking status: Never    Smokeless tobacco: Never   Substance Use Topics    Alcohol use: Yes     Comment: Socially.    Drug use: No       /84 (Patient Position: Sitting)   Wt 58.5 kg (128 lb 15.5 oz)   LMP 2025 (Approximate) Comment:  cycle lasted until   BMI 19.61 kg/m²     Current Outpatient Medications on File  Prior to Visit   Medication Sig Dispense Refill    norgestimate-ethinyl estradioL (ORTHO-CYCLEN) 0.25-35 mg-mcg per tablet Take 1 tablet by mouth once daily. 30 tablet 11    valACYclovir (VALTREX) 1000 MG tablet Take 1 tablet (1,000 mg total) by mouth once daily. For 5 days as needed for outbreaks 30 tablet 5    HYDROcodone-acetaminophen (NORCO) 5-325 mg per tablet Take 1 tablet by mouth every 6 (six) hours as needed for Pain. (Patient not taking: Reported on 10/10/2024) 5 tablet 0    ibuprofen (ADVIL,MOTRIN) 800 MG tablet Take 1 tablet (800 mg total) by mouth every 8 (eight) hours as needed for Pain. (Patient not taking: Reported on 1/30/2025) 30 tablet 0     No current facility-administered medications on file prior to visit.        Review of patient's allergies indicates:  No Known Allergies     ROS:  GENERAL: Denies weight gain or weight loss. Feeling well overall.   SKIN: Denies rash or lesions.   HEAD: Denies head injury or headache.   NODES: Denies enlarged lymph nodes.   CHEST: Denies chest pain or shortness of breath.   CARDIOVASCULAR: Denies palpitations or left sided chest pain.   ABDOMEN: No abdominal pain, constipation, diarrhea, nausea, vomiting or rectal bleeding.   URINARY: No frequency, dysuria, hematuria, or burning on urination.  REPRODUCTIVE: See HPI.   HEMATOLOGIC: No easy bruisability or excessive bleeding with the exception of menstrual cycles.  MUSCULOSKELETAL: Denies joint pain or swelling.   NEUROLOGIC: Denies syncope or weakness.   PSYCHIATRIC: Denies depression, anxiety or mood swings.    PHYSICAL EXAM:  APPEARANCE: Well nourished, well developed, in no acute distress.  AFFECT: WNL, alert and oriented x 3  SKIN: No acne or hirsutism  NECK: Neck symmetric without masses or thyromegaly  NODES: No inguinal, cervical, axillary, or femoral lymph node enlargement  CHEST: Good respiratory effect  ABDOMEN: Soft.  No tenderness or masses.  No hepatosplenomegaly.  No hernias.  PELVIC:  Deferred  EXTREMITIES: No edema.      ICD-10-CM ICD-9-CM    1. Preoperative exam for gynecologic surgery  Z01.818 V72.83 TYPE AND SCREEN PREOP      CBC auto differential          Discussed treatment options with patient - continued expectant management, repeat hysteroscopy, DVM, hysterectomy. She desires hysterectomy. Recommended a laparoscopic approach to achieve a minimally invasive route. Recommended ovarian conservation given her age.     Surgical risks discussed including but not limited to risk of bleeding, infection, injury to adjacent organs, and laparotomy.      I have discussed the risks, benefits, indications, and alternatives of the procedure in detail.  The patient verbalizes her understanding.  All questions answered.  Consents signed.  The patient agrees to proceed to proceed as planned.

## 2025-02-07 RX ORDER — SODIUM CHLORIDE 0.9 % (FLUSH) 0.9 %
10 SYRINGE (ML) INJECTION
Status: CANCELLED | OUTPATIENT
Start: 2025-02-17

## 2025-02-07 RX ORDER — EPINEPHRINE 0.3 MG/.3ML
0.3 INJECTION SUBCUTANEOUS ONCE AS NEEDED
Status: CANCELLED | OUTPATIENT
Start: 2025-02-10

## 2025-02-07 RX ORDER — DIPHENHYDRAMINE HYDROCHLORIDE 50 MG/ML
50 INJECTION INTRAMUSCULAR; INTRAVENOUS ONCE AS NEEDED
Status: CANCELLED | OUTPATIENT
Start: 2025-02-17

## 2025-02-07 RX ORDER — EPINEPHRINE 0.3 MG/.3ML
0.3 INJECTION SUBCUTANEOUS ONCE AS NEEDED
Status: CANCELLED | OUTPATIENT
Start: 2025-02-17

## 2025-02-07 RX ORDER — HEPARIN 100 UNIT/ML
500 SYRINGE INTRAVENOUS
Status: CANCELLED | OUTPATIENT
Start: 2025-02-17

## 2025-02-07 RX ORDER — SODIUM CHLORIDE 0.9 % (FLUSH) 0.9 %
10 SYRINGE (ML) INJECTION
Status: CANCELLED | OUTPATIENT
Start: 2025-02-10

## 2025-02-07 RX ORDER — HEPARIN 100 UNIT/ML
500 SYRINGE INTRAVENOUS
Status: CANCELLED | OUTPATIENT
Start: 2025-02-10

## 2025-02-07 RX ORDER — DIPHENHYDRAMINE HYDROCHLORIDE 50 MG/ML
50 INJECTION INTRAMUSCULAR; INTRAVENOUS ONCE AS NEEDED
Status: CANCELLED | OUTPATIENT
Start: 2025-02-10

## 2025-02-10 ENCOUNTER — INFUSION (OUTPATIENT)
Dept: INFUSION THERAPY | Facility: HOSPITAL | Age: 50
End: 2025-02-10
Payer: COMMERCIAL

## 2025-02-10 ENCOUNTER — ANESTHESIA EVENT (OUTPATIENT)
Dept: SURGERY | Facility: HOSPITAL | Age: 50
End: 2025-02-10
Payer: COMMERCIAL

## 2025-02-10 VITALS
OXYGEN SATURATION: 98 % | HEART RATE: 68 BPM | RESPIRATION RATE: 16 BRPM | DIASTOLIC BLOOD PRESSURE: 76 MMHG | SYSTOLIC BLOOD PRESSURE: 140 MMHG | TEMPERATURE: 99 F

## 2025-02-10 DIAGNOSIS — N93.9 ABNORMAL UTERINE BLEEDING DUE TO INTRAMURAL LEIOMYOMA: ICD-10-CM

## 2025-02-10 DIAGNOSIS — D25.1 ABNORMAL UTERINE BLEEDING DUE TO INTRAMURAL LEIOMYOMA: ICD-10-CM

## 2025-02-10 DIAGNOSIS — D50.0 IRON DEFICIENCY ANEMIA DUE TO CHRONIC BLOOD LOSS: Primary | ICD-10-CM

## 2025-02-10 PROCEDURE — 96365 THER/PROPH/DIAG IV INF INIT: CPT

## 2025-02-10 PROCEDURE — 63600175 PHARM REV CODE 636 W HCPCS: Mod: JZ,TB | Performed by: INTERNAL MEDICINE

## 2025-02-10 RX ORDER — SODIUM CHLORIDE 0.9 % (FLUSH) 0.9 %
10 SYRINGE (ML) INJECTION
Status: DISCONTINUED | OUTPATIENT
Start: 2025-02-10 | End: 2025-02-10 | Stop reason: HOSPADM

## 2025-02-10 RX ORDER — EPINEPHRINE 0.3 MG/.3ML
0.3 INJECTION SUBCUTANEOUS ONCE AS NEEDED
Status: DISCONTINUED | OUTPATIENT
Start: 2025-02-10 | End: 2025-02-10 | Stop reason: HOSPADM

## 2025-02-10 RX ORDER — DIPHENHYDRAMINE HYDROCHLORIDE 50 MG/ML
50 INJECTION INTRAMUSCULAR; INTRAVENOUS ONCE AS NEEDED
Status: DISCONTINUED | OUTPATIENT
Start: 2025-02-10 | End: 2025-02-10 | Stop reason: HOSPADM

## 2025-02-10 RX ORDER — HEPARIN 100 UNIT/ML
500 SYRINGE INTRAVENOUS
Status: DISCONTINUED | OUTPATIENT
Start: 2025-02-10 | End: 2025-02-10 | Stop reason: HOSPADM

## 2025-02-10 RX ADMIN — FERRIC CARBOXYMALTOSE INJECTION 750 MG: 50 INJECTION, SOLUTION INTRAVENOUS at 03:02

## 2025-02-10 NOTE — PLAN OF CARE
Patient tolerated Injectafer #1 without incident. Vitals stable. PIV inserted & flushed with blood return present, saline locked & catheter removed at d/c. No signs or symptoms of a reaction during 30 minute post observation period. Pt uses  [facility] to track appointments. Stable & ambulatory off of unit at d/c accompanied by her mother.

## 2025-02-11 ENCOUNTER — HOSPITAL ENCOUNTER (OUTPATIENT)
Facility: HOSPITAL | Age: 50
Discharge: HOME | End: 2025-02-11
Attending: OBSTETRICS & GYNECOLOGY | Admitting: OBSTETRICS & GYNECOLOGY
Payer: COMMERCIAL

## 2025-02-11 ENCOUNTER — ANESTHESIA (OUTPATIENT)
Dept: SURGERY | Facility: HOSPITAL | Age: 50
End: 2025-02-11
Payer: COMMERCIAL

## 2025-02-11 VITALS
RESPIRATION RATE: 17 BRPM | HEIGHT: 68 IN | WEIGHT: 128 LBS | DIASTOLIC BLOOD PRESSURE: 79 MMHG | HEART RATE: 80 BPM | TEMPERATURE: 98 F | SYSTOLIC BLOOD PRESSURE: 117 MMHG | OXYGEN SATURATION: 99 % | BODY MASS INDEX: 19.4 KG/M2

## 2025-02-11 DIAGNOSIS — Z90.710 S/P LAPAROSCOPIC HYSTERECTOMY: Primary | ICD-10-CM

## 2025-02-11 DIAGNOSIS — D25.1 FIBROIDS, INTRAMURAL: ICD-10-CM

## 2025-02-11 LAB
ABO + RH BLD: NORMAL
BLD GP AB SCN CELLS X3 SERPL QL: NORMAL
POCT GLUCOSE: 89 MG/DL (ref 70–110)
SPECIMEN OUTDATE: NORMAL

## 2025-02-11 PROCEDURE — 63600175 PHARM REV CODE 636 W HCPCS: Performed by: OBSTETRICS & GYNECOLOGY

## 2025-02-11 PROCEDURE — 58573 TLH W/T/O UTERUS OVER 250 G: CPT | Mod: AS,,,

## 2025-02-11 PROCEDURE — 71000015 HC POSTOP RECOV 1ST HR: Performed by: OBSTETRICS & GYNECOLOGY

## 2025-02-11 PROCEDURE — 25000003 PHARM REV CODE 250

## 2025-02-11 PROCEDURE — 36000710: Performed by: OBSTETRICS & GYNECOLOGY

## 2025-02-11 PROCEDURE — 37000008 HC ANESTHESIA 1ST 15 MINUTES: Performed by: OBSTETRICS & GYNECOLOGY

## 2025-02-11 PROCEDURE — 63600175 PHARM REV CODE 636 W HCPCS

## 2025-02-11 PROCEDURE — 25000003 PHARM REV CODE 250: Performed by: OBSTETRICS & GYNECOLOGY

## 2025-02-11 PROCEDURE — 86900 BLOOD TYPING SEROLOGIC ABO: CPT | Performed by: OBSTETRICS & GYNECOLOGY

## 2025-02-11 PROCEDURE — 58573 TLH W/T/O UTERUS OVER 250 G: CPT | Mod: ,,, | Performed by: OBSTETRICS & GYNECOLOGY

## 2025-02-11 PROCEDURE — 36000711: Performed by: OBSTETRICS & GYNECOLOGY

## 2025-02-11 PROCEDURE — 37000009 HC ANESTHESIA EA ADD 15 MINS: Performed by: OBSTETRICS & GYNECOLOGY

## 2025-02-11 PROCEDURE — 63600175 PHARM REV CODE 636 W HCPCS: Mod: JZ,TB

## 2025-02-11 PROCEDURE — 27201423 OPTIME MED/SURG SUP & DEVICES STERILE SUPPLY: Performed by: OBSTETRICS & GYNECOLOGY

## 2025-02-11 PROCEDURE — 71000016 HC POSTOP RECOV ADDL HR: Performed by: OBSTETRICS & GYNECOLOGY

## 2025-02-11 PROCEDURE — 88307 TISSUE EXAM BY PATHOLOGIST: CPT | Performed by: PATHOLOGY

## 2025-02-11 PROCEDURE — 36415 COLL VENOUS BLD VENIPUNCTURE: CPT | Performed by: OBSTETRICS & GYNECOLOGY

## 2025-02-11 PROCEDURE — 71000039 HC RECOVERY, EACH ADD'L HOUR: Performed by: OBSTETRICS & GYNECOLOGY

## 2025-02-11 PROCEDURE — 71000033 HC RECOVERY, INTIAL HOUR: Performed by: OBSTETRICS & GYNECOLOGY

## 2025-02-11 RX ORDER — PREGABALIN 50 MG/1
50 CAPSULE ORAL
Status: COMPLETED | OUTPATIENT
Start: 2025-02-11 | End: 2025-02-11

## 2025-02-11 RX ORDER — FAMOTIDINE 20 MG/1
20 TABLET, FILM COATED ORAL
Status: COMPLETED | OUTPATIENT
Start: 2025-02-11 | End: 2025-02-11

## 2025-02-11 RX ORDER — ONDANSETRON 8 MG/1
8 TABLET, ORALLY DISINTEGRATING ORAL EVERY 8 HOURS PRN
Status: DISCONTINUED | OUTPATIENT
Start: 2025-02-11 | End: 2025-02-11 | Stop reason: HOSPADM

## 2025-02-11 RX ORDER — HYDROMORPHONE HYDROCHLORIDE 2 MG/ML
1 INJECTION, SOLUTION INTRAMUSCULAR; INTRAVENOUS; SUBCUTANEOUS EVERY 4 HOURS PRN
Status: DISCONTINUED | OUTPATIENT
Start: 2025-02-11 | End: 2025-02-11 | Stop reason: HOSPADM

## 2025-02-11 RX ORDER — DIPHENHYDRAMINE HYDROCHLORIDE 50 MG/ML
25 INJECTION INTRAMUSCULAR; INTRAVENOUS EVERY 4 HOURS PRN
Status: DISCONTINUED | OUTPATIENT
Start: 2025-02-11 | End: 2025-02-11 | Stop reason: HOSPADM

## 2025-02-11 RX ORDER — ROCURONIUM BROMIDE 10 MG/ML
INJECTION, SOLUTION INTRAVENOUS
Status: DISCONTINUED | OUTPATIENT
Start: 2025-02-11 | End: 2025-02-11

## 2025-02-11 RX ORDER — LIDOCAINE HYDROCHLORIDE 10 MG/ML
0.5 INJECTION, SOLUTION EPIDURAL; INFILTRATION; INTRACAUDAL; PERINEURAL
Status: DISCONTINUED | OUTPATIENT
Start: 2025-02-11 | End: 2025-02-11 | Stop reason: HOSPADM

## 2025-02-11 RX ORDER — ACETAMINOPHEN 500 MG
1000 TABLET ORAL
Status: COMPLETED | OUTPATIENT
Start: 2025-02-11 | End: 2025-02-11

## 2025-02-11 RX ORDER — MUPIROCIN 20 MG/G
OINTMENT TOPICAL
Status: COMPLETED | OUTPATIENT
Start: 2025-02-11 | End: 2025-02-11

## 2025-02-11 RX ORDER — HYDROCODONE BITARTRATE AND ACETAMINOPHEN 10; 325 MG/1; MG/1
1 TABLET ORAL EVERY 4 HOURS PRN
Status: DISCONTINUED | OUTPATIENT
Start: 2025-02-11 | End: 2025-02-11 | Stop reason: HOSPADM

## 2025-02-11 RX ORDER — SODIUM CHLORIDE, SODIUM LACTATE, POTASSIUM CHLORIDE, CALCIUM CHLORIDE 600; 310; 30; 20 MG/100ML; MG/100ML; MG/100ML; MG/100ML
INJECTION, SOLUTION INTRAVENOUS CONTINUOUS
Status: DISCONTINUED | OUTPATIENT
Start: 2025-02-11 | End: 2025-02-11 | Stop reason: HOSPADM

## 2025-02-11 RX ORDER — HYDROCODONE BITARTRATE AND ACETAMINOPHEN 5; 325 MG/1; MG/1
1 TABLET ORAL EVERY 4 HOURS PRN
Status: DISCONTINUED | OUTPATIENT
Start: 2025-02-11 | End: 2025-02-11 | Stop reason: HOSPADM

## 2025-02-11 RX ORDER — FENTANYL CITRATE 50 UG/ML
INJECTION, SOLUTION INTRAMUSCULAR; INTRAVENOUS
Status: DISCONTINUED | OUTPATIENT
Start: 2025-02-11 | End: 2025-02-11

## 2025-02-11 RX ORDER — ONDANSETRON HYDROCHLORIDE 2 MG/ML
4 INJECTION, SOLUTION INTRAVENOUS DAILY PRN
Status: DISCONTINUED | OUTPATIENT
Start: 2025-02-11 | End: 2025-02-11 | Stop reason: HOSPADM

## 2025-02-11 RX ORDER — DEXMEDETOMIDINE HYDROCHLORIDE 100 UG/ML
INJECTION, SOLUTION INTRAVENOUS
Status: DISCONTINUED | OUTPATIENT
Start: 2025-02-11 | End: 2025-02-11

## 2025-02-11 RX ORDER — AMOXICILLIN 250 MG
1 CAPSULE ORAL 2 TIMES DAILY
Status: DISCONTINUED | OUTPATIENT
Start: 2025-02-11 | End: 2025-02-11 | Stop reason: HOSPADM

## 2025-02-11 RX ORDER — HYDROMORPHONE HYDROCHLORIDE 2 MG/ML
INJECTION, SOLUTION INTRAMUSCULAR; INTRAVENOUS; SUBCUTANEOUS
Status: DISCONTINUED | OUTPATIENT
Start: 2025-02-11 | End: 2025-02-11

## 2025-02-11 RX ORDER — DEXAMETHASONE SODIUM PHOSPHATE 4 MG/ML
INJECTION, SOLUTION INTRA-ARTICULAR; INTRALESIONAL; INTRAMUSCULAR; INTRAVENOUS; SOFT TISSUE
Status: DISCONTINUED | OUTPATIENT
Start: 2025-02-11 | End: 2025-02-11

## 2025-02-11 RX ORDER — IBUPROFEN 800 MG/1
800 TABLET ORAL EVERY 8 HOURS PRN
Qty: 30 TABLET | Refills: 0 | Status: SHIPPED | OUTPATIENT
Start: 2025-02-11

## 2025-02-11 RX ORDER — MIDAZOLAM HYDROCHLORIDE 1 MG/ML
INJECTION INTRAMUSCULAR; INTRAVENOUS
Status: DISCONTINUED | OUTPATIENT
Start: 2025-02-11 | End: 2025-02-11

## 2025-02-11 RX ORDER — CEFAZOLIN 2 G/1
2 INJECTION, POWDER, FOR SOLUTION INTRAMUSCULAR; INTRAVENOUS
Status: COMPLETED | OUTPATIENT
Start: 2025-02-11 | End: 2025-02-11

## 2025-02-11 RX ORDER — HYDROCODONE BITARTRATE AND ACETAMINOPHEN 5; 325 MG/1; MG/1
1 TABLET ORAL EVERY 6 HOURS PRN
Qty: 12 TABLET | Refills: 0 | Status: SHIPPED | OUTPATIENT
Start: 2025-02-11

## 2025-02-11 RX ORDER — BUPIVACAINE HYDROCHLORIDE 2.5 MG/ML
INJECTION, SOLUTION INFILTRATION; PERINEURAL
Status: DISCONTINUED | OUTPATIENT
Start: 2025-02-11 | End: 2025-02-11 | Stop reason: HOSPADM

## 2025-02-11 RX ORDER — ONDANSETRON HYDROCHLORIDE 2 MG/ML
INJECTION, SOLUTION INTRAVENOUS
Status: DISCONTINUED | OUTPATIENT
Start: 2025-02-11 | End: 2025-02-11

## 2025-02-11 RX ORDER — IBUPROFEN 400 MG/1
800 TABLET ORAL
Status: DISCONTINUED | OUTPATIENT
Start: 2025-02-12 | End: 2025-02-11 | Stop reason: HOSPADM

## 2025-02-11 RX ORDER — PROPOFOL 10 MG/ML
VIAL (ML) INTRAVENOUS
Status: DISCONTINUED | OUTPATIENT
Start: 2025-02-11 | End: 2025-02-11

## 2025-02-11 RX ORDER — POLYETHYLENE GLYCOL 3350 17 G/17G
17 POWDER, FOR SOLUTION ORAL DAILY
Status: DISCONTINUED | OUTPATIENT
Start: 2025-02-12 | End: 2025-02-11 | Stop reason: HOSPADM

## 2025-02-11 RX ORDER — CELECOXIB 100 MG/1
400 CAPSULE ORAL
Status: COMPLETED | OUTPATIENT
Start: 2025-02-11 | End: 2025-02-11

## 2025-02-11 RX ORDER — PROCHLORPERAZINE EDISYLATE 5 MG/ML
5 INJECTION INTRAMUSCULAR; INTRAVENOUS EVERY 6 HOURS PRN
Status: DISCONTINUED | OUTPATIENT
Start: 2025-02-11 | End: 2025-02-11 | Stop reason: HOSPADM

## 2025-02-11 RX ORDER — PROCHLORPERAZINE EDISYLATE 5 MG/ML
5 INJECTION INTRAMUSCULAR; INTRAVENOUS EVERY 10 MIN PRN
Status: DISCONTINUED | OUTPATIENT
Start: 2025-02-11 | End: 2025-02-11 | Stop reason: HOSPADM

## 2025-02-11 RX ORDER — DIPHENHYDRAMINE HCL 25 MG
25 CAPSULE ORAL EVERY 4 HOURS PRN
Status: DISCONTINUED | OUTPATIENT
Start: 2025-02-11 | End: 2025-02-11 | Stop reason: HOSPADM

## 2025-02-11 RX ORDER — SODIUM CHLORIDE 0.9 % (FLUSH) 0.9 %
3 SYRINGE (ML) INJECTION
Status: DISCONTINUED | OUTPATIENT
Start: 2025-02-11 | End: 2025-02-11 | Stop reason: HOSPADM

## 2025-02-11 RX ORDER — LIDOCAINE HYDROCHLORIDE 20 MG/ML
INJECTION, SOLUTION EPIDURAL; INFILTRATION; INTRACAUDAL; PERINEURAL
Status: DISCONTINUED | OUTPATIENT
Start: 2025-02-11 | End: 2025-02-11

## 2025-02-11 RX ORDER — HYDROMORPHONE HYDROCHLORIDE 2 MG/ML
0.2 INJECTION, SOLUTION INTRAMUSCULAR; INTRAVENOUS; SUBCUTANEOUS EVERY 5 MIN PRN
Status: DISCONTINUED | OUTPATIENT
Start: 2025-02-11 | End: 2025-02-11 | Stop reason: HOSPADM

## 2025-02-11 RX ORDER — ACETAMINOPHEN 325 MG/1
650 TABLET ORAL EVERY 4 HOURS PRN
Status: DISCONTINUED | OUTPATIENT
Start: 2025-02-11 | End: 2025-02-11 | Stop reason: HOSPADM

## 2025-02-11 RX ORDER — MEPERIDINE HYDROCHLORIDE 50 MG/ML
12.5 INJECTION INTRAMUSCULAR; INTRAVENOUS; SUBCUTANEOUS ONCE AS NEEDED
Status: DISCONTINUED | OUTPATIENT
Start: 2025-02-11 | End: 2025-02-11 | Stop reason: HOSPADM

## 2025-02-11 RX ORDER — KETOROLAC TROMETHAMINE 30 MG/ML
30 INJECTION, SOLUTION INTRAMUSCULAR; INTRAVENOUS
Status: DISCONTINUED | OUTPATIENT
Start: 2025-02-11 | End: 2025-02-11 | Stop reason: HOSPADM

## 2025-02-11 RX ADMIN — DEXAMETHASONE SODIUM PHOSPHATE 12 MG: 4 INJECTION, SOLUTION INTRA-ARTICULAR; INTRALESIONAL; INTRAMUSCULAR; INTRAVENOUS; SOFT TISSUE at 01:02

## 2025-02-11 RX ADMIN — PREGABALIN 50 MG: 50 CAPSULE ORAL at 10:02

## 2025-02-11 RX ADMIN — DEXMEDETOMIDINE 12 MCG: 200 INJECTION, SOLUTION INTRAVENOUS at 01:02

## 2025-02-11 RX ADMIN — FAMOTIDINE 20 MG: 20 TABLET ORAL at 10:02

## 2025-02-11 RX ADMIN — ROCURONIUM BROMIDE 50 MG: 10 INJECTION, SOLUTION INTRAVENOUS at 01:02

## 2025-02-11 RX ADMIN — DEXMEDETOMIDINE 8 MCG: 200 INJECTION, SOLUTION INTRAVENOUS at 01:02

## 2025-02-11 RX ADMIN — CELECOXIB 400 MG: 100 CAPSULE ORAL at 10:02

## 2025-02-11 RX ADMIN — MIDAZOLAM HYDROCHLORIDE 2 MG: 1 INJECTION, SOLUTION INTRAMUSCULAR; INTRAVENOUS at 01:02

## 2025-02-11 RX ADMIN — HYDROMORPHONE HYDROCHLORIDE 0.6 MG: 2 INJECTION INTRAMUSCULAR; INTRAVENOUS; SUBCUTANEOUS at 01:02

## 2025-02-11 RX ADMIN — SUGAMMADEX 400 MG: 100 INJECTION, SOLUTION INTRAVENOUS at 02:02

## 2025-02-11 RX ADMIN — CEFAZOLIN 2 G: 2 INJECTION, POWDER, FOR SOLUTION INTRAMUSCULAR; INTRAVENOUS at 01:02

## 2025-02-11 RX ADMIN — ACETAMINOPHEN 1000 MG: 500 TABLET ORAL at 10:02

## 2025-02-11 RX ADMIN — ONDANSETRON 4 MG: 2 INJECTION, SOLUTION INTRAMUSCULAR; INTRAVENOUS at 02:02

## 2025-02-11 RX ADMIN — SODIUM CHLORIDE: 0.9 INJECTION, SOLUTION INTRAVENOUS at 01:02

## 2025-02-11 RX ADMIN — FENTANYL CITRATE 50 MCG: 50 INJECTION INTRAMUSCULAR; INTRAVENOUS at 01:02

## 2025-02-11 RX ADMIN — ROCURONIUM BROMIDE 20 MG: 10 INJECTION, SOLUTION INTRAVENOUS at 01:02

## 2025-02-11 RX ADMIN — PROCHLORPERAZINE EDISYLATE 5 MG: 5 INJECTION INTRAMUSCULAR; INTRAVENOUS at 05:02

## 2025-02-11 RX ADMIN — LIDOCAINE HYDROCHLORIDE 100 MG: 20 INJECTION, SOLUTION EPIDURAL; INFILTRATION; INTRACAUDAL; PERINEURAL at 01:02

## 2025-02-11 RX ADMIN — FENTANYL CITRATE 25 MCG: 50 INJECTION INTRAMUSCULAR; INTRAVENOUS at 01:02

## 2025-02-11 RX ADMIN — MUPIROCIN: 20 OINTMENT TOPICAL at 10:02

## 2025-02-11 RX ADMIN — MIDAZOLAM HYDROCHLORIDE 2 MG: 1 INJECTION, SOLUTION INTRAMUSCULAR; INTRAVENOUS at 12:02

## 2025-02-11 RX ADMIN — PROPOFOL 150 MG: 10 INJECTION, EMULSION INTRAVENOUS at 01:02

## 2025-02-11 RX ADMIN — KETOROLAC TROMETHAMINE 30 MG: 30 INJECTION, SOLUTION INTRAMUSCULAR; INTRAVENOUS at 05:02

## 2025-02-11 NOTE — BRIEF OP NOTE
BRIEF OPERATIVE NOTE       SUMMARY      Surgery Date: 02/11/2025     SURGEON: Evita Euceda    ASSISTANT: Erin Butcher PA    PREOP DIAGNOSIS:   Fibroids  Menorrhagia  Chronic blood loss anemia    POSTOP DIAGNOSIS:    1. Fibroids  2. Menorrhagia  3. Chronic blood loss anemia    PROCEDURES:   Total laparoscopic hysterectomy with bilateral salpingectomy  Cystoscopy    ANESTHESIA: general    FINDINGS/KEY COMPONENTS: Uterus approximately 12 week size. Fundal fibroids. Normal tubes and ovaries bilaterally. Bladder intact on cystoscopy. Both ureters briskly effluxing clear urine.     ESTIMATED BLOOD LOSS: 70 cc    COMPLICATIONS: None    PATHOLOGY:   Specimens (From admission, onward)       Start     Ordered    02/11/25 1417  Specimen to Pathology, Surgery Gynecology and Obstetrics  Once        Comments: Pre-op Diagnosis: Fibroids, intramural [D25.1]Procedure(s):HYSTERECTOMY,TOTAL,LAPAROSCOPIC,WITH SALPINGECTOMYCYSTOSCOPY Number of specimens: 1Name of specimens: 1.  Uterus, cervix, bilateral tubes     References:    Click here for ordering Quick Tip   Question Answer Comment   Procedure Type: Gynecology and Obstetrics    Specimen Class: Routine/Screening    Release to patient Immediate        02/11/25 0498

## 2025-02-11 NOTE — OPERATIVE NOTE ADDENDUM
Certification of Assistant at Surgery       Surgery Date: 2/11/2025     Participating Surgeons:  Surgeons and Role:     * Evita Euceda MD - Primary    Procedures:  Procedure(s) (LRB):  HYSTERECTOMY,TOTAL,LAPAROSCOPIC,WITH SALPINGECTOMY (Bilateral)  CYSTOSCOPY (N/A)    Assistant Surgeon's Certification of Necessity:  I understand that section 1842 (b) (6) (d) of the Social Security Act generally prohibits Medicare Part B reasonable charge payment for the services of assistants at surgery in teaching hospitals when qualified residents are available to furnish such services. I certify that the services for which payment is claimed were medically necessary, and that no qualified resident was available to perform the services. I further understand that these services are subject to post-payment review by the Medicare carrier.      Erin Salamanca PA-C    02/11/2025  2:43 PM

## 2025-02-11 NOTE — ANESTHESIA PREPROCEDURE EVALUATION
[facility]  Anesthesia Pre-Operative Evaluation         Patient Name: Surinder Elizabeth  YOB: 1975  MRN: [id number]    SUBJECTIVE:     Pre-operative evaluation for Procedure(s) (LRB):  HYSTERECTOMY, TOTAL, LAPAROSCOPIC (N/A)  CYSTOSCOPY (N/A)     02/10/2025    Surinder Elizabeth is a 49 y.o. female w/ a significant PMHx of iron deficiency anemia, uterine fibroids, and HSV who presents for treatment of heavy cycles contributing to IVIS.    Last H+H 8.7/30  Type and screen sent  Aware of high risk for requiring blood transfusion during procedure.  Hx of anesthetics in past without complications  NPO>8 hours  No food or drug allergies  Amenable to proceed with scheduled procedure      Echo Summary  No results found for this or any previous visit.       Prev airway:  Intubation     Date/Time: 9/24/2024 9:36 AM     Performed by: Cely Gallagher CRNA  Authorized by: Cely Gallagher CRNA    Intubation:     Induction:  Intravenous    Intubated:  Postinduction    Mask Ventilation:  Easy mask    Attempts:  1    Attempted By:  Student    Method of Intubation:  Direct    Difficult Airway Encountered?: No      Complications:  None    Airway Device:  Supraglottic airway/LMA    Airway Device Size:  3.0    Placement Verified By:  Capnometry    Complicating Factors:  None    Findings Post-Intubation:  BS equal bilateral    LDA: None documented.       Drips: None documented.      Patient Active Problem List   Diagnosis    Iron deficiency anemia due to chronic blood loss    Bartholin cyst    Chronic right shoulder pain    Dysphonia    Vocal cord nodule    Vocal fold edema    Fibroids, intramural    Abnormal uterine bleeding due to intramural leiomyoma    HSV (herpes simplex virus) anogenital infection    Benign ethnic neutropenia    Status hysteroscopic myomectomy    Menorrhagia with irregular cycle       Review of patient's allergies indicates:  No Known Allergies    Current Inpatient Medications:      No current  facility-administered medications on file prior to encounter.     Current Outpatient Medications on File Prior to Encounter   Medication Sig Dispense Refill    HYDROcodone-acetaminophen (NORCO) 5-325 mg per tablet Take 1 tablet by mouth every 6 (six) hours as needed for Pain. (Patient not taking: Reported on 10/10/2024) 5 tablet 0    ibuprofen (ADVIL,MOTRIN) 800 MG tablet Take 1 tablet (800 mg total) by mouth every 8 (eight) hours as needed for Pain. (Patient not taking: Reported on 1/30/2025) 30 tablet 0    valACYclovir (VALTREX) 1000 MG tablet Take 1 tablet (1,000 mg total) by mouth once daily. For 5 days as needed for outbreaks 30 tablet 5       Past Surgical History:   Procedure Laterality Date    HYSTEROSCOPIC RESECTION OF MYOMA N/A 9/24/2024    Procedure: MYOMECTOMY, HYSTEROSCOPIC;  Surgeon: Evita Euceda MD;  Location: [facility];  Service: OB/GYN;  Laterality: N/A;       Social History:  Tobacco Use: Low Risk  (2/10/2025)    Patient History     Smoking Tobacco Use: Never     Smokeless Tobacco Use: Never     Passive Exposure: Not on file      Alcohol Use: Alcohol Misuse (6/6/2024)    Received from [facility]    AUDIT-C     Frequency of Alcohol Consumption: 2-3 times a week     Average Number of Drinks: 3 or 4     Frequency of Binge Drinking: Never        OBJECTIVE:     Vital Signs Range (Last 24H):  Temp:  [36.9 °C (98.5 °F)]   Pulse:  [68-70]   Resp:  [16]   BP: (127-140)/(75-76)   SpO2:  [98 %]       Significant Labs:  Lab Results   Component Value Date    WBC 4.03 01/30/2025    HGB 8.7 (L) 01/30/2025    HCT 30.9 (L) 01/30/2025     01/30/2025    CHOL 155 09/10/2024    TRIG 53 09/10/2024    HDL 74 09/10/2024    ALT 20 11/14/2024    AST 22 11/14/2024     11/14/2024    K 4.8 11/14/2024     11/14/2024    CREATININE 0.7 11/14/2024    BUN 10 11/14/2024    CO2 26 11/14/2024    TSH 0.555 05/21/2024    HGBA1C 4.9 03/13/2023       Diagnostic Studies: No relevant studies.    EKG:   No  results found for this or any previous visit.    2D ECHO:  TTE:  No results found for this or any previous visit.    TRU:  No results found for this or any previous visit.    ASSESSMENT/PLAN:       Pre-op Assessment    I have reviewed the Patient Summary Reports.     I have reviewed the Nursing Notes. I have reviewed the NPO Status.   I have reviewed the Medications.     Review of Systems  Anesthesia Hx:  No problems with previous Anesthesia   History of prior surgery of interest to airway management or planning:          Denies Family Hx of Anesthesia complications.    Denies Personal Hx of Anesthesia complications.                    Social:  Non-Smoker, Social Alcohol Use       Hematology/Oncology:       -- Anemia: Iron Deficiency Anemia Blood Loss Anemia    chronic and hypochromic                                 Cardiovascular:     Denies Pacemaker.    Denies MI.     Denies CABG/stent.  Denies Dysrhythmias.   Denies Angina.                                    Pulmonary:    Denies COPD.                     Renal/:   Denies Chronic Renal Disease.                Hepatic/GI:      Denies GERD. Denies Liver Disease.               Neurological:    Denies CVA. Denies Neuromuscular Disease.   Denies Seizures.                                Endocrine:  Denies Diabetes.         Denies Obesity / BMI > 30      Physical Exam  General: Well nourished, Cooperative, Alert and Oriented    Airway:  Mallampati: I / I  Mouth Opening: Small, but > 3cm  TM Distance: Normal  Tongue: Normal  Neck ROM: Normal ROM    Dental:  Intact, Periodontal disease        Anesthesia Plan  Type of Anesthesia, risks & benefits discussed:    Anesthesia Type: Gen ETT  Intra-op Monitoring Plan: Standard ASA Monitors  Post Op Pain Control Plan: multimodal analgesia and IV/PO Opioids PRN  Induction:  IV  Airway Plan: Direct and Video, Post-Induction  Informed Consent: Informed consent signed with the Patient and all parties understand the risks and agree  with anesthesia plan.  All questions answered. Patient consented to blood products? Yes  ASA Score: 2  Day of Surgery Review of History & Physical: H&P Update referred to the surgeon/provider.    Ready For Surgery From Anesthesia Perspective.     .

## 2025-02-11 NOTE — DISCHARGE SUMMARY
[facility] - Surgery (Hospital)  Discharge Note  Short Stay    Procedure(s) (LRB):  HYSTERECTOMY,TOTAL,LAPAROSCOPIC,WITH SALPINGECTOMY (Bilateral)  CYSTOSCOPY (N/A)      OUTCOME: Patient tolerated treatment/procedure well without complication and is now ready for discharge.    DISPOSITION: Home or Self Care    FINAL DIAGNOSIS:  S/P laparoscopic hysterectomy    FOLLOWUP: In clinic    DISCHARGE INSTRUCTIONS:    Discharge Procedure Orders   Notify your health care provider if you experience any of the following:  temperature >100.4     Notify your health care provider if you experience any of the following:  persistent nausea and vomiting or diarrhea     Notify your health care provider if you experience any of the following:  severe uncontrolled pain     Notify your health care provider if you experience any of the following:  redness, tenderness, or signs of infection (pain, swelling, redness, odor or green/yellow discharge around incision site)     Notify your health care provider if you experience any of the following:  difficulty breathing or increased cough     Notify your health care provider if you experience any of the following:  severe persistent headache     Notify your health care provider if you experience any of the following:  worsening rash     Notify your health care provider if you experience any of the following:  persistent dizziness, light-headedness, or visual disturbances     Notify your health care provider if you experience any of the following:  increased confusion or weakness      Evita Barbosa MD, MAS, FACOG  Obstetrics and Gynecology

## 2025-02-11 NOTE — OP NOTE
Surgery Date: 02/11/2025     SURGEON: Evita Euceda    ASSISTANT: Erin Butcher PA    PREOP DIAGNOSIS:   Fibroids  Menorrhagia  Chronic blood loss anemia    POSTOP DIAGNOSIS:    1. Fibroids  2. Menorrhagia  3. Chronic blood loss anemia    PROCEDURES:   Total laparoscopic hysterectomy with bilateral salpingectomy  Cystoscopy    ANESTHESIA: general    FINDINGS/KEY COMPONENTS: Uterus approximately 12 week size. Fundal fibroids. Normal tubes and ovaries bilaterally. Bladder intact on cystoscopy. Both ureters briskly effluxing clear urine.     ESTIMATED BLOOD LOSS: 70 cc    COMPLICATIONS: None    URINE OUTPUT: 600 cc    PROCEDURE: Patient was taking to the operating room where general anesthesia was administered and found to be adequate.  She was prepped and draped in the dorsal lithotomy position.  Two right angle retractors were placed in the vagina.  The anterior lip of the cervix was grasped with a single tooth tenaculum.  The uterus was sounded to approximately 10 cm.  A stay suture of 0-Vicryl was placed at 12 o'clock and 6 o'clock on the cervix.  A TEE manipulator was placed inside the endometrial cavity.      Gloves were changed.  A Veress needle was inserted into the umbilicus under tenting of the anterior abdominal wall.  Placement into the peritoneal cavity was confirmed via saline drop test.  The abdomen was insufflated to 15mm Hg using Carbon dioxide.  A 10 mm supraumbilical skin incision was made with the scalpel.  A 10 mm Optiview trocar was advanced through this incision.  Excellent hemostasis was noted.  The patient was placed in deep Trendelenburg.  An 5 mm left lateral skin incision was made with a scalpel and a 5 mm trocar was advanced through this incision under visualization of the camera.  A 5 mm right lateral skin incision was made with the scalpel.  A 5 mm trocar was advanced through this incision under visualization of the camera.  Excellent hemostasis was noted.       Attention was turned to the left round ligament.  This was cauterized and transected and continued anteriorly to create the bladder flap to the midline.  Attention was turned to the left fallopian tube.  The fimbria was identified and elevated. The mesosalpinx was cauterized and transected to the level of the utero-ovarian ligament.  This was cauterized and transected and carried down to the level of the uterine vessels.  The vessels were cauterized but not transected.  Attention was turned to the right round ligament.  It was cauterized and transected and continued anteriorly to finish creating the bladder flap.  Attention was turned to the right fallopian tube.  The fimbria was identified and elevated. The mesosalpinx was cauterized and transected to the level of the utero-ovarian ligament.  This was cauterized and transected and carried down to the level of the uterine vessels.  The vessels were cauterized but not transected.  Attention was turned to the anterior portion of the cervix.  The bladder was dissected off the cervix.  The anterior colpotomy was created.  This was continued to the level of the uterine vessels bilaterally.  Attention was turned to the posterior portion of the uterus.  The posterior colpotomy was created and carried around to the level of the uterine vessels bilaterally.  The left uterine vessels were again cauterized and transected.  The anterior and posterior colpotomies were connected.  The right uterine vessels were cauterized and transected.  The anterior and posterior colpotomies were connected.        The uterus was removed vaginally.  A moist laparotomy sponge inside of glove was placed in the vagina to maintain intraperitoneal pressure.  The vaginal cuff was reapproximated in an interrupted fashion using 0-Vicryl suture using the Endostitch.  The pelvis was copiously irrigated and suctioned.  Excellent hemostasis was noted.      The lap in glove were removed.  The vaginal  cuff was palpated and found to be intact.  The negron catheter was removed.  A cystoscope was advanced through the urethra.  The bladder was inspected and found to be intact.  Both ureters were seen effluxing urine.  The cystoscope was removed.      Attention was turned to the anterior abdominal wall. The abdomen was deflated and all trocars were removed.  The skin was closed with 4-0 Monocryl in a subcuticular fashion.  The incisions were injected with .25% Marcaine.  Sponge, lap, and needle counts were correct x 2.  The patient was taken to the recovery room in stable condition.

## 2025-02-11 NOTE — ANESTHESIA PROCEDURE NOTES
Intubation    Date/Time: 2/11/2025 1:15 PM    Performed by: Markus Howell DO  Authorized by: Ari Yoder MD    Intubation:     Induction:  Intravenous    Intubated:  Postinduction    Mask Ventilation:  Easy with oral airway    Attempts:  1    Attempted By:  Resident anesthesiologist    Method of Intubation:  Direct    Blade:  Dewayne 3    Laryngeal View Grade: Grade I - full view of cords      Difficult Airway Encountered?: No      Complications:  None    Airway Device:  Oral endotracheal tube    Airway Device Size:  7.5    Tube secured:  23    Secured at:  The lips    Placement Verified By:  Capnometry    Complicating Factors:  None    Findings Post-Intubation:  BS equal bilateral and atraumatic/condition of teeth unchanged

## 2025-02-12 NOTE — ANESTHESIA POSTPROCEDURE EVALUATION
Anesthesia Post Evaluation    Patient: Surinder Elizabeth    Procedure(s) Performed: Procedure(s) (LRB):  HYSTERECTOMY,TOTAL,LAPAROSCOPIC,WITH SALPINGECTOMY (Bilateral)  CYSTOSCOPY (N/A)    Final Anesthesia Type: general      Patient location during evaluation: PACU  Patient participation: Yes- Able to Participate  Level of consciousness: awake and alert and oriented  Post-procedure vital signs: reviewed and stable  Pain management: adequate  Airway patency: patent    PONV status at discharge: No PONV  Anesthetic complications: no      Cardiovascular status: hemodynamically stable  Respiratory status: unassisted  Hydration status: euvolemic  Follow-up not needed.              Vitals Value Taken Time   /79 02/11/25 1821   Temp 36.8 °C (98.3 °F) 02/11/25 1821   Pulse 80 02/11/25 1821   Resp 17 02/11/25 1821   SpO2 99 % 02/11/25 1821         Event Time   Out of Recovery 16:11:45         Pain/Girish Score: Pain Rating Prior to Med Admin: 2 (2/11/2025  5:23 PM)  Pain Rating Post Med Admin: 2 (2/11/2025  6:21 PM)  Girish Score: 10 (2/11/2025  6:21 PM)

## 2025-02-13 LAB
FINAL PATHOLOGIC DIAGNOSIS: NORMAL
GROSS: NORMAL
Lab: NORMAL

## 2025-02-18 ENCOUNTER — RESULTS FOLLOW-UP (OUTPATIENT)
Dept: OBSTETRICS AND GYNECOLOGY | Facility: CLINIC | Age: 50
End: 2025-02-18

## 2025-02-18 ENCOUNTER — INFUSION (OUTPATIENT)
Dept: INFUSION THERAPY | Facility: OTHER | Age: 50
End: 2025-02-18
Payer: COMMERCIAL

## 2025-02-18 VITALS
RESPIRATION RATE: 16 BRPM | DIASTOLIC BLOOD PRESSURE: 68 MMHG | SYSTOLIC BLOOD PRESSURE: 124 MMHG | OXYGEN SATURATION: 100 % | HEART RATE: 80 BPM

## 2025-02-18 DIAGNOSIS — D50.0 IRON DEFICIENCY ANEMIA DUE TO CHRONIC BLOOD LOSS: Primary | ICD-10-CM

## 2025-02-18 DIAGNOSIS — N93.9 ABNORMAL UTERINE BLEEDING DUE TO INTRAMURAL LEIOMYOMA: ICD-10-CM

## 2025-02-18 DIAGNOSIS — D25.1 ABNORMAL UTERINE BLEEDING DUE TO INTRAMURAL LEIOMYOMA: ICD-10-CM

## 2025-02-18 PROCEDURE — 63600175 PHARM REV CODE 636 W HCPCS: Mod: JZ,TB | Performed by: INTERNAL MEDICINE

## 2025-02-18 PROCEDURE — 96365 THER/PROPH/DIAG IV INF INIT: CPT

## 2025-02-18 PROCEDURE — 25000003 PHARM REV CODE 250: Performed by: INTERNAL MEDICINE

## 2025-02-18 RX ORDER — HEPARIN 100 UNIT/ML
500 SYRINGE INTRAVENOUS
Status: DISCONTINUED | OUTPATIENT
Start: 2025-02-18 | End: 2025-02-18 | Stop reason: HOSPADM

## 2025-02-18 RX ORDER — EPINEPHRINE 0.3 MG/.3ML
0.3 INJECTION SUBCUTANEOUS ONCE AS NEEDED
Status: DISCONTINUED | OUTPATIENT
Start: 2025-02-18 | End: 2025-02-18 | Stop reason: HOSPADM

## 2025-02-18 RX ORDER — DIPHENHYDRAMINE HYDROCHLORIDE 50 MG/ML
50 INJECTION INTRAMUSCULAR; INTRAVENOUS ONCE AS NEEDED
Status: DISCONTINUED | OUTPATIENT
Start: 2025-02-18 | End: 2025-02-18 | Stop reason: HOSPADM

## 2025-02-18 RX ORDER — SODIUM CHLORIDE 0.9 % (FLUSH) 0.9 %
10 SYRINGE (ML) INJECTION
Status: DISCONTINUED | OUTPATIENT
Start: 2025-02-18 | End: 2025-02-18 | Stop reason: HOSPADM

## 2025-02-18 RX ADMIN — SODIUM CHLORIDE: 9 INJECTION, SOLUTION INTRAVENOUS at 01:02

## 2025-02-18 RX ADMIN — FERRIC CARBOXYMALTOSE INJECTION 750 MG: 50 INJECTION, SOLUTION INTRAVENOUS at 01:02

## 2025-02-18 NOTE — PLAN OF CARE
Problem: Adult Inpatient Plan of Care  Goal: Plan of Care Review  Outcome: Progressing     Problem: Anemia  Goal: Anemia Symptom Improvement  Outcome: Progressing       Patient presented today for injectafer infusion. PIV started and infusion given with no issues. VS remained stable throughout visit and assessment provided no issues. PIV removed w/o complications, all questions answered and left clinic in no acute distress.

## 2025-02-18 NOTE — PLAN OF CARE
Injectafer infusion administered no reaction. Patient tolerated well. Patient accompanied by mother for discharge home. 24 gauge IV removed, catheter intact. No apparent distress noted. Discharge instructions given to patient. Patient understands instructions and AVS given via [facility] per pt request.

## 2025-02-19 ENCOUNTER — OFFICE VISIT (OUTPATIENT)
Dept: OBSTETRICS AND GYNECOLOGY | Facility: CLINIC | Age: 50
End: 2025-02-19
Payer: COMMERCIAL

## 2025-02-19 VITALS
DIASTOLIC BLOOD PRESSURE: 85 MMHG | SYSTOLIC BLOOD PRESSURE: 118 MMHG | BODY MASS INDEX: 18.81 KG/M2 | WEIGHT: 123.69 LBS

## 2025-02-19 DIAGNOSIS — Z98.890 POST-OPERATIVE STATE: ICD-10-CM

## 2025-02-19 DIAGNOSIS — Z90.710 S/P LAPAROSCOPIC HYSTERECTOMY: Primary | ICD-10-CM

## 2025-02-19 NOTE — PROGRESS NOTES
CC: Postoperative visit    Surinder Elizabeth is a 49 y.o. female  presents for a postoperative visit s/p Total laparoscopic hysterectomy with bilateral salpingectomy with Cystoscopy on 25.  Her postoperative course was uncomplicated.  She is doing well, but is having a hard time emotionally adjusting to her decision on a hysterectomy. She has a sense of regret or loss and questions if she made the correct decision for herself. She questions if she should have had a myomectomy versus hysterectomy. She is interested in speaking to a therapist.  She is ambulating well. Denies any issues with urinating of defecating. Last normal BM was today.  Appetite is good. Pain is minimal. No vaginal bleeding since post-op day 2. She denies any fever, chills, N/V.    Op Note, findings:Uterus approximately 12 week size. Fundal fibroids. Normal tubes and ovaries bilaterally. Bladder intact on cystoscopy. Both ureters briskly effluxing clear urine.     Pathology showed:  Final Pathologic Diagnosis Uterus, cervix and bilateral fallopian tubes weighing 282 g showing:  Inactive endometrium  Negative cervix  Multiple subserosal, intramural and submucosal leiomyomas are identified measuring up to 5 cm  Unremarkable right and left fallopian tubes   Comment: Interp By John Zamorano M.D., Signed on 2025 at 12:49       /85 (Patient Position: Sitting)   Wt 56.1 kg (123 lb 10.9 oz)   LMP 2025 (Exact Date)   BMI 18.81 kg/m²     ROS:  GENERAL: No fever, chills, fatigability or weight loss.  VULVAR: No pain, no lesions and no itching.  VAGINAL: No relaxation, no itching, no discharge, no abnormal bleeding and no lesions.  ABDOMEN: +minimal abdominal pain. Denies nausea. Denies vomiting. No diarrhea. No constipation  BREAST: Denies pain. No lumps. No discharge.  URINARY: No incontinence, no nocturia, no frequency and no dysuria.  CARDIOVASCULAR: No chest pain. No shortness of breath. No leg cramps.  NEUROLOGICAL: No  headaches. No vision changes.    Physical Exam  APPEARANCE: Well nourished, well developed, tearful.   AFFECT: WNL, alert and oriented x 3  SKIN: No acne or hirsutism  ABDOMEN: Soft.  No tenderness or masses.  No hepatosplenomegaly.  No hernias.  INCISIONS: Clean, dry, intact. Well healed.   PELVIC: deferred until 6 week post op  EXTREMITIES: No edema.      1. S/P laparoscopic hysterectomy, BS, cystoscopy        2. Post-operative state  Ambulatory referral/consult to Behavioral Health      Discussed her procedure in length.  Referral placed for therapy.   F/u with Dr. Euceda for 6 week post-op

## 2025-03-07 ENCOUNTER — PATIENT MESSAGE (OUTPATIENT)
Dept: OBSTETRICS AND GYNECOLOGY | Facility: CLINIC | Age: 50
End: 2025-03-07
Payer: COMMERCIAL

## 2025-03-20 ENCOUNTER — OFFICE VISIT (OUTPATIENT)
Dept: OBSTETRICS AND GYNECOLOGY | Facility: CLINIC | Age: 50
End: 2025-03-20
Payer: COMMERCIAL

## 2025-03-20 VITALS
WEIGHT: 123.44 LBS | BODY MASS INDEX: 18.77 KG/M2 | SYSTOLIC BLOOD PRESSURE: 144 MMHG | DIASTOLIC BLOOD PRESSURE: 82 MMHG

## 2025-03-20 DIAGNOSIS — Z09 POSTOP CHECK: Primary | ICD-10-CM

## 2025-03-20 PROCEDURE — 99999 PR PBB SHADOW E&M-EST. PATIENT-LVL III: CPT | Mod: PBBFAC,,, | Performed by: OBSTETRICS & GYNECOLOGY

## 2025-03-20 PROCEDURE — 3079F DIAST BP 80-89 MM HG: CPT | Mod: CPTII,S$GLB,, | Performed by: OBSTETRICS & GYNECOLOGY

## 2025-03-20 PROCEDURE — 1160F RVW MEDS BY RX/DR IN RCRD: CPT | Mod: CPTII,S$GLB,, | Performed by: OBSTETRICS & GYNECOLOGY

## 2025-03-20 PROCEDURE — 99024 POSTOP FOLLOW-UP VISIT: CPT | Mod: S$GLB,,, | Performed by: OBSTETRICS & GYNECOLOGY

## 2025-03-20 PROCEDURE — 1159F MED LIST DOCD IN RCRD: CPT | Mod: CPTII,S$GLB,, | Performed by: OBSTETRICS & GYNECOLOGY

## 2025-03-20 PROCEDURE — 3077F SYST BP >= 140 MM HG: CPT | Mod: CPTII,S$GLB,, | Performed by: OBSTETRICS & GYNECOLOGY

## 2025-03-20 NOTE — PROGRESS NOTES
CC: Postoperative visit    Surinder Elizabeth is a 49 y.o. female  presents for a postoperative visit s/p TLH, BS, cystoscopy on 2025.  Her postoperative course was uncomplicated.  She is doing well postoperative.    Pathology showed:  Final Pathologic Diagnosis Uterus, cervix and bilateral fallopian tubes weighing 282 g showing:  Inactive endometrium  Negative cervix  Multiple subserosal, intramural and submucosal leiomyomas are identified measuring up to 5 cm  Unremarkable right and left fallopian tubes       BP (!) 144/82   Wt 56 kg (123 lb 7.3 oz)   LMP 2025 (Exact Date)   BMI 18.77 kg/m²     ROS:  GENERAL: No fever, chills, fatigability or weight loss.  VULVAR: No pain, no lesions and no itching.  VAGINAL: No relaxation, no itching, no discharge, no abnormal bleeding and no lesions.  ABDOMEN: No abdominal pain. Denies nausea. Denies vomiting. No diarrhea. No constipation  BREAST: Denies pain. No lumps. No discharge.  URINARY: No incontinence, no nocturia, no frequency and no dysuria.  CARDIOVASCULAR: No chest pain. No shortness of breath. No leg cramps.  NEUROLOGICAL: No headaches. No vision changes.    Physical Exam  APPEARANCE: Well nourished, well developed, in no acute distress.  AFFECT: WNL, alert and oriented x 3  SKIN: No acne or hirsutism  ABDOMEN: Soft.  No tenderness or masses.  No hepatosplenomegaly.  No hernias.  INCISIONS: Clean, dry, intact. Well healed.   PELVIC: Normal external genitalia without lesions.  Normal hair distribution.  Adequate perineal body, normal urethral meatus. Vaginal cuff intact. Well healed.  Bimanual exam shows uterus and cervix to be surgically absent.  Adnexa without masses or tenderness.    EXTREMITIES: No edema.      1. Postop check            Surgical findings discussed.     Patient can return to normal activities. Delay intercourse for 4 weeks.    Return to clinic in 1 year for well woman exam.

## 2025-03-24 ENCOUNTER — LAB VISIT (OUTPATIENT)
Dept: LAB | Facility: OTHER | Age: 50
End: 2025-03-24
Attending: INTERNAL MEDICINE
Payer: COMMERCIAL

## 2025-03-24 DIAGNOSIS — D50.0 IRON DEFICIENCY ANEMIA DUE TO CHRONIC BLOOD LOSS: ICD-10-CM

## 2025-03-24 LAB
ABSOLUTE EOSINOPHIL (OHS): 0.11 K/UL
ABSOLUTE MONOCYTE (OHS): 0.47 K/UL (ref 0.3–1)
ABSOLUTE NEUTROPHIL COUNT (OHS): 2.11 K/UL (ref 1.8–7.7)
ALBUMIN SERPL BCP-MCNC: 3.9 G/DL (ref 3.5–5.2)
ALP SERPL-CCNC: 86 UNIT/L (ref 40–150)
ALT SERPL W/O P-5'-P-CCNC: 18 UNIT/L (ref 10–44)
ANION GAP (OHS): 12 MMOL/L (ref 8–16)
ANISOCYTOSIS BLD QL SMEAR: SLIGHT
AST SERPL-CCNC: 16 UNIT/L (ref 11–45)
BASOPHILS # BLD AUTO: 0.02 K/UL
BASOPHILS NFR BLD AUTO: 0.5 %
BILIRUB SERPL-MCNC: 0.2 MG/DL (ref 0.1–1)
BUN SERPL-MCNC: 8 MG/DL (ref 6–20)
CALCIUM SERPL-MCNC: 9.1 MG/DL (ref 8.7–10.5)
CHLORIDE SERPL-SCNC: 107 MMOL/L (ref 95–110)
CO2 SERPL-SCNC: 21 MMOL/L (ref 23–29)
CREAT SERPL-MCNC: 0.7 MG/DL (ref 0.5–1.4)
DACRYOCYTES BLD QL SMEAR: NORMAL
ERYTHROCYTE [DISTWIDTH] IN BLOOD BY AUTOMATED COUNT: ABNORMAL %
FERRITIN SERPL-MCNC: 182 NG/ML (ref 20–300)
GFR SERPLBLD CREATININE-BSD FMLA CKD-EPI: >60 ML/MIN/1.73/M2
GLUCOSE SERPL-MCNC: 71 MG/DL (ref 70–110)
HCT VFR BLD AUTO: 42.8 % (ref 37–48.5)
HGB BLD-MCNC: 12.8 GM/DL (ref 12–16)
IMM GRANULOCYTES # BLD AUTO: 0 K/UL (ref 0–0.04)
IMM GRANULOCYTES NFR BLD AUTO: 0 % (ref 0–0.5)
IRON SATN MFR SERPL: 15 % (ref 20–50)
IRON SERPL-MCNC: 44 UG/DL (ref 30–160)
LYMPHOCYTES # BLD AUTO: 1.06 K/UL (ref 1–4.8)
MCH RBC QN AUTO: 25.3 PG (ref 27–50)
MCHC RBC AUTO-ENTMCNC: 29.9 G/DL (ref 32–36)
MCV RBC AUTO: 85 FL (ref 82–98)
NUCLEATED RBC (/100WBC) (OHS): 0 /100 WBC
PLATELET # BLD AUTO: 258 K/UL (ref 150–450)
PLATELET BLD QL SMEAR: NORMAL
PMV BLD AUTO: 9 FL (ref 9.2–12.9)
POIKILOCYTOSIS BLD QL SMEAR: SLIGHT
POTASSIUM SERPL-SCNC: 4 MMOL/L (ref 3.5–5.1)
PROT SERPL-MCNC: 7.4 GM/DL (ref 6–8.4)
RBC # BLD AUTO: 5.05 M/UL (ref 4–5.4)
RELATIVE EOSINOPHIL (OHS): 2.9 %
RELATIVE LYMPHOCYTE (OHS): 28.1 % (ref 18–48)
RELATIVE MONOCYTE (OHS): 12.5 % (ref 4–15)
RELATIVE NEUTROPHIL (OHS): 56 % (ref 38–73)
SODIUM SERPL-SCNC: 140 MMOL/L (ref 136–145)
TIBC SERPL-MCNC: 293 UG/DL (ref 250–450)
TRANSFERRIN SERPL-MCNC: 198 MG/DL (ref 200–375)
WBC # BLD AUTO: 3.77 K/UL (ref 3.9–12.7)

## 2025-03-24 PROCEDURE — 84466 ASSAY OF TRANSFERRIN: CPT

## 2025-03-24 PROCEDURE — 82728 ASSAY OF FERRITIN: CPT

## 2025-03-24 PROCEDURE — 80053 COMPREHEN METABOLIC PANEL: CPT

## 2025-03-24 PROCEDURE — 36415 COLL VENOUS BLD VENIPUNCTURE: CPT

## 2025-03-24 PROCEDURE — 85025 COMPLETE CBC W/AUTO DIFF WBC: CPT

## 2025-03-25 ENCOUNTER — OFFICE VISIT (OUTPATIENT)
Dept: HEMATOLOGY/ONCOLOGY | Facility: CLINIC | Age: 50
End: 2025-03-25
Payer: COMMERCIAL

## 2025-03-25 ENCOUNTER — RESULTS FOLLOW-UP (OUTPATIENT)
Dept: HEMATOLOGY/ONCOLOGY | Facility: CLINIC | Age: 50
End: 2025-03-25

## 2025-03-25 DIAGNOSIS — D50.0 IRON DEFICIENCY ANEMIA DUE TO CHRONIC BLOOD LOSS: Primary | ICD-10-CM

## 2025-03-25 DIAGNOSIS — Z67.A1 BENIGN ETHNIC NEUTROPENIA: ICD-10-CM

## 2025-03-25 DIAGNOSIS — Z90.710 S/P LAPAROSCOPIC HYSTERECTOMY: ICD-10-CM

## 2025-03-25 PROCEDURE — 1159F MED LIST DOCD IN RCRD: CPT | Mod: CPTII,95,, | Performed by: INTERNAL MEDICINE

## 2025-03-25 PROCEDURE — 98006 SYNCH AUDIO-VIDEO EST MOD 30: CPT | Mod: 95,,, | Performed by: INTERNAL MEDICINE

## 2025-03-25 PROCEDURE — 1160F RVW MEDS BY RX/DR IN RCRD: CPT | Mod: CPTII,95,, | Performed by: INTERNAL MEDICINE

## 2025-03-25 NOTE — PROGRESS NOTES
[facility]     Outpatient Hematology/Medical Oncology Note  Patient: Surinder Elizabeth  MRN: [id number]  Primary Care Provider: No, Primary Doctor  Chief Complaint:  Iron Deficiency Anemia     Subjective     Subjective:   HPI:   Surinder Elizabeth is a 49 y.o. female with PMH significant for:   - Benign Ethnic Neutropenia  - Uterine fibroids    She is followed by Hematology for iron deficiency anemia 2/2 HMB now s/p TLH/BS on 2/11/2025. She presents today for routine follow up.     HPI:  2021 - 2023: increase in baseline HMB; Hb range: 6.4-8.4  3/16/23: TVUS: fibroid uterus - 3.1 cm subcuosal, 5.1 subserosal, 3.1 and 3 cm intramural fibroids   Prior labs: 2023 - folate: 15.9, b12: 795, HIV and hepatitis testing : negative, peripheral smear: mild anisopoikilocytosis, absolute neutropenia, copper: 1692  IV iron: Injectafer (8/10/2023) with improvement in Hb to 13.2 (10/2023); 6/2024 and 2/2025 (Hgb: 8.2 to 12.8)    Interval History:    Mrs. Elizabeth overall feels well. Patient's current symptoms are:  (1) Fatigue: improving     Patient otherwise denies other sites of bleeding, fevers, chills, night sweats, headaches, chest pain, SOB, cough, abdominal pain, N/V, diarrhea, constipation, weight loss, rashes     Review of Systems:  14-point review of systems was asked with the pertinent positives and/or negatives stated in HPI.     Past Medical History:   - COLTON, Uterine fibroids, Barthlolin's Cyst (2024)     Past Surgical HIstory:   Past Surgical History:   Procedure Laterality Date    CYSTOSCOPY N/A 2/11/2025    Procedure: CYSTOSCOPY;  Surgeon: Evita Euceda MD;  Location: [facility] OR;  Service: OB/GYN;  Laterality: N/A;    HYSTERECTOMY, TOTAL, LAPAROSCOPIC, WITH SALPINGECTOMY Bilateral 2/11/2025    Procedure: HYSTERECTOMY,TOTAL,LAPAROSCOPIC,WITH SALPINGECTOMY;  Surgeon: Evita Euceda MD;  Location: [facility] OR;  Service: OB/GYN;  Laterality: Bilateral;    HYSTEROSCOPIC RESECTION OF MYOMA N/A 9/24/2024     Procedure: MYOMECTOMY, HYSTEROSCOPIC;  Surgeon: Evita Euceda MD;  Location: [facility];  Service: OB/GYN;  Laterality: N/A;       Family History:   - Father: Prostate Cancer (70s, early stage)    Social History:    reports that she has never smoked. She has never used smokeless tobacco. She reports current alcohol use. She reports that she does not use drugs.  - Occupation: singer    Allergies:  Review of patient's allergies indicates:  No Known Allergies    Medications:  Current Outpatient Medications   Medication Sig Dispense Refill    HYDROcodone-acetaminophen (NORCO) 5-325 mg per tablet Take 1 tablet by mouth every 6 (six) hours as needed for Pain. (Patient not taking: Reported on 3/20/2025) 12 tablet 0    ibuprofen (ADVIL,MOTRIN) 800 MG tablet Take 1 tablet (800 mg total) by mouth every 8 (eight) hours as needed for Pain. (Patient not taking: Reported on 3/20/2025) 30 tablet 0    valACYclovir (VALTREX) 1000 MG tablet Take 1 tablet (1,000 mg total) by mouth once daily. For 5 days as needed for outbreaks (Patient not taking: Reported on 3/20/2025) 30 tablet 5     No current facility-administered medications for this visit.          Objective:   Vitals:   There were no vitals filed for this visit.    BMI: There is no height or weight on file to calculate BMI.    Physical Exam  ECOG Performance Status:    General Appearance:    Alert, cooperative, no distress   VIRTUAL VISIT     Laboratory Data:  Lab Visit on 03/24/2025   Component Date Value Ref Range Status    Sodium 03/24/2025 140  136 - 145 mmol/L Final    Potassium 03/24/2025 4.0  3.5 - 5.1 mmol/L Final    Chloride 03/24/2025 107  95 - 110 mmol/L Final    CO2 03/24/2025 21 (L)  23 - 29 mmol/L Final    Glucose 03/24/2025 71  70 - 110 mg/dL Final    BUN 03/24/2025 8  6 - 20 mg/dL Final    Creatinine 03/24/2025 0.7  0.5 - 1.4 mg/dL Final    Calcium 03/24/2025 9.1  8.7 - 10.5 mg/dL Final    Protein Total 03/24/2025 7.4  6.0 - 8.4 gm/dL Final    Albumin  03/24/2025 3.9  3.5 - 5.2 g/dL Final    Bilirubin Total 03/24/2025 0.2  0.1 - 1.0 mg/dL Final    For infants and newborns, interpretation of results should be based   on gestational age, weight and in agreement with clinical   observations.    Premature Infant recommended reference ranges:   0-24 hours:  <8.0 mg/dL   24-48 hours: <12.0 mg/dL   3-5 days:    <15.0 mg/dL   6-29 days:   <15.0 mg/dL    ALP 03/24/2025 86  40 - 150 unit/L Final    AST 03/24/2025 16  11 - 45 unit/L Final    ALT 03/24/2025 18  10 - 44 unit/L Final    Anion Gap 03/24/2025 12  8 - 16 mmol/L Final    eGFR 03/24/2025 >60  >60 mL/min/1.73/m2 Final    Estimated GFR calculated using the CKD-EPI creatinine (2021) equation.    Ferritin 03/24/2025 182.0  20.0 - 300.0 ng/mL Final    Iron Level 03/24/2025 44  30 - 160 ug/dL Final    Transferrin 03/24/2025 198 (L)  200 - 375 mg/dL Final    Iron Binding Capacity Total 03/24/2025 293  250 - 450 ug/dL Final    Iron Saturation 03/24/2025 15 (L)  20 - 50 % Final    WBC 03/24/2025 3.77 (L)  3.90 - 12.70 K/uL Final    RBC 03/24/2025 5.05  4.00 - 5.40 M/uL Final    HGB 03/24/2025 12.8  12.0 - 16.0 gm/dL Final    HCT 03/24/2025 42.8  37.0 - 48.5 % Final    MCV 03/24/2025 85  82 - 98 fL Final    MCH 03/24/2025 25.3 (L)  27.0 - 50.0 pg Final    MCHC 03/24/2025 29.9 (L)  32.0 - 36.0 g/dL Final    RDW 03/24/2025    Final    Result Not Available    Platelet Count 03/24/2025 258  150 - 450 K/uL Final    MPV 03/24/2025 9.0 (L)  9.2 - 12.9 fL Final    Nucleated RBC 03/24/2025 0  <=0 /100 WBC Final    Neut % 03/24/2025 56.0  38 - 73 % Final    Lymph % 03/24/2025 28.1  18 - 48 % Final    Mono % 03/24/2025 12.5  4 - 15 % Final    Eos % 03/24/2025 2.9  <=8 % Final    Basophil % 03/24/2025 0.5  <=1.9 % Final    Imm Grans % 03/24/2025 0.0  0.0 - 0.5 % Final    Neut # 03/24/2025 2.11  1.8 - 7.7 K/uL Final    Lymph # 03/24/2025 1.06  1 - 4.8 K/uL Final    Mono # 03/24/2025 0.47  0.3 - 1 K/uL Final    Eos # 03/24/2025 0.11  <=0.5  K/uL Final    Baso # 2025 0.02  <=0.2 K/uL Final    Imm Grans # 2025 0.00  0.00 - 0.04 K/uL Final    Platelet Estimate 2025 Appears Normal    Final    Anisocytosis 2025 Slight   Final    Poik 2025 Slight   Final    Tear drop cell 2025 Occasional   Final     Imagin24: TVUS: multiple fibroids - 3 cm submucosal fibroid in posterior uterus, 6 and 4 cm intramural fibroid in posterior uterus, endometrial wnl    Labs:  2024 labs: Hgb: 12.4, MCV: 86, WBC: 2900, ANC: 1500, platelets: 327; CMP: wnl, TSAT: 18% (2%), ferritin: 62    2025 Labs:  - Hgb: 12.8 (8.7), MCV: 85, WBC: 3770, platelets: 258   - CMP: unremarkable   - TSAT: 15%, ferritin: 182 (4)    Assessment   Assessment and Plan:   Surinder Elizabeth is a 49 y.o. female with Benign Ethnic Neutropenia and Uterine Fibroids. She is followed by Hematology for iron deficiency anemia 2/ HMB now s/p TLH/BS on 2025. She presents today for routine follow up. She last received IV iron with injectafer 2/10 and 2025.    # Iron Deficiency Anemia   - Last labs: 3/24/2025 - Hgb: 12.8 (8.7), MCV: 85, WBC: 3770, platelets: 258, CMP: unremarkable, TSAT: 15%, ferritin: 182 (4)    - Prior labs indicated iron deficiency with ferritin <30 and TSAT <20%; now with normalization of hemoglobin and ferritin after hysterectomy   - Repeat labs in 3 months to ensure iron deficiency (saturated iron) resolves after TLH    # Mild Intermittent Neutropenia  - Presumably COLTON ie normal variant  - Folate: 15.9, B12: 795, HIV and hepatitis C testing: negative, peripheral smear: mild anisopoikilocytosis, absolute neutropenia, copper: 1692   - CTM    # Other Co-Morbidities  - AUB: s/p TLH/BS on 2025, per gynecology  - Cancer Screening: Colonoscopy: 2023 (1 cm rectal hyperplastic polyp) - performed outside of [facility] (verify records), Pap:  (hpv: neg, pap: wnl), [id number] (extremely dense breast tissue)- encouraged PCP f/u    Follow Up:  -  RTC in 3 months with labs prior - please place in extended Tyrer Cuzick slot so I can also calculate her breast cancer risk given extremely dense breast tissue noted on prior mammograms   [] colonoscopy    MDM includes:    - Acute or chronic illness or injury that poses a threat to life or bodily function  - Review of prior external notes from unique source  - Independent review and explanation of 3+ results from unique tests   - Discussion of management and ordering 3+ unique tests   - Extensive discussion of treatment and management  - Prescription drug management  - Drug therapy requiring intensive monitoring for toxicity    - Overall, I discussed the diagnosis, history, stage, labs/imaging, prognosis, management, and treatment plan as applicable. I reviewed adverse short and long term effects as applicable.   - Informed patient if symptoms are getting worse that it is their responsibility to call the clinic and schedule follow up sooner than stated follow up. Also informed patient if they do not hear from the appointment center in 2-5 business days for their referrals, the patient must call the Oncology clinic so we can follow up on procedures or referral scheduling. Patient was fully informed of current medical plan, all questions were answered and patient verbalized understanding. No further questions.   - Face to Face Visit time I spent with the patient: 30 minutes of total time spent on the encounter, including counseling patient and/or coordinating care, which includes face to face time and non-face to face time preparing to see the patient (eg, review of tests), Obtaining and/or reviewing separately obtained history, Documenting clinical information in the electronic or other health record, Independently interpreting results (not separately reported) and communicating results to the patient/family/caregiver, or Care coordination (not separately reported).   -Telephone/Video Visit: This is a telephone/video  visit that took place. The patient location is: home. The patient understanding of these limitations and that there is an increased risk of incomplete diagnosis and potential for incomplete or inaccurate discussion of treatment options. With that understanding , they are instructed that if their problem isn't resolving they will schedule an in person visit. Each patient to whom he or she provides medical services by telemedicine is:  (1) informed of the relationship between the physician and patient and the respective role of any other health care provider with respect to management of the patient; and (2) notified that he or she may decline to receive medical services by telemedicine and may withdraw from such care at any time.    Signed   Beatrice Lester MD  [facility]

## 2025-04-08 ENCOUNTER — PATIENT MESSAGE (OUTPATIENT)
Dept: OTOLARYNGOLOGY | Facility: CLINIC | Age: 50
End: 2025-04-08
Payer: COMMERCIAL

## 2025-06-20 ENCOUNTER — LAB VISIT (OUTPATIENT)
Dept: LAB | Facility: OTHER | Age: 50
End: 2025-06-20
Attending: INTERNAL MEDICINE
Payer: COMMERCIAL

## 2025-06-20 DIAGNOSIS — D25.1 FIBROIDS, INTRAMURAL: ICD-10-CM

## 2025-06-20 DIAGNOSIS — N92.1 MENORRHAGIA WITH IRREGULAR CYCLE: ICD-10-CM

## 2025-06-20 LAB
ABSOLUTE EOSINOPHIL (OHS): 0.03 K/UL
ABSOLUTE MONOCYTE (OHS): 0.49 K/UL (ref 0.3–1)
ABSOLUTE NEUTROPHIL COUNT (OHS): 1.69 K/UL (ref 1.8–7.7)
BASOPHILS # BLD AUTO: 0.03 K/UL
BASOPHILS NFR BLD AUTO: 0.9 %
ERYTHROCYTE [DISTWIDTH] IN BLOOD BY AUTOMATED COUNT: 14.8 % (ref 11.5–14.5)
HCT VFR BLD AUTO: 40.2 % (ref 37–48.5)
HGB BLD-MCNC: 13.2 GM/DL (ref 12–16)
IMM GRANULOCYTES # BLD AUTO: 0.01 K/UL (ref 0–0.04)
IMM GRANULOCYTES NFR BLD AUTO: 0.3 % (ref 0–0.5)
LYMPHOCYTES # BLD AUTO: 1.18 K/UL (ref 1–4.8)
MCH RBC QN AUTO: 29.3 PG (ref 27–31)
MCHC RBC AUTO-ENTMCNC: 32.8 G/DL (ref 32–36)
MCV RBC AUTO: 89 FL (ref 82–98)
NUCLEATED RBC (/100WBC) (OHS): 0 /100 WBC
PLATELET # BLD AUTO: 295 K/UL (ref 150–450)
PMV BLD AUTO: 9 FL (ref 9.2–12.9)
RBC # BLD AUTO: 4.5 M/UL (ref 4–5.4)
RELATIVE EOSINOPHIL (OHS): 0.9 %
RELATIVE LYMPHOCYTE (OHS): 34.4 % (ref 18–48)
RELATIVE MONOCYTE (OHS): 14.3 % (ref 4–15)
RELATIVE NEUTROPHIL (OHS): 49.2 % (ref 38–73)
WBC # BLD AUTO: 3.43 K/UL (ref 3.9–12.7)

## 2025-06-20 PROCEDURE — 85025 COMPLETE CBC W/AUTO DIFF WBC: CPT

## 2025-06-20 PROCEDURE — 36415 COLL VENOUS BLD VENIPUNCTURE: CPT

## 2025-07-11 DIAGNOSIS — A60.9 HSV (HERPES SIMPLEX VIRUS) ANOGENITAL INFECTION: ICD-10-CM

## 2025-07-11 RX ORDER — VALACYCLOVIR HYDROCHLORIDE 1 G/1
1000 TABLET, FILM COATED ORAL DAILY
Qty: 30 TABLET | Refills: 5 | Status: SHIPPED | OUTPATIENT
Start: 2025-07-11 | End: 2026-07-11

## 2025-07-11 NOTE — TELEPHONE ENCOUNTER
Refill Routing Note   Medication(s) are not appropriate for processing by [facility] for the following reason(s):        Due for refill >6 months ago    [facility] action(s):  Defer               Appointments  past 12m or future 3m with PCP    Date Provider   Last Visit   12/16/2024 Madeline Shin MD   Next Visit   Visit date not found Madeline Shin MD   ED visits in past 90 days: 0        Note composed:11:59 AM 07/11/2025

## 2025-07-22 ENCOUNTER — OFFICE VISIT (OUTPATIENT)
Dept: HEMATOLOGY/ONCOLOGY | Facility: CLINIC | Age: 50
End: 2025-07-22
Payer: COMMERCIAL

## 2025-07-22 VITALS
OXYGEN SATURATION: 99 % | HEART RATE: 71 BPM | SYSTOLIC BLOOD PRESSURE: 113 MMHG | DIASTOLIC BLOOD PRESSURE: 69 MMHG | HEIGHT: 68 IN | BODY MASS INDEX: 18.74 KG/M2 | TEMPERATURE: 99 F | WEIGHT: 123.69 LBS | RESPIRATION RATE: 16 BRPM

## 2025-07-22 DIAGNOSIS — Z67.A1 BENIGN ETHNIC NEUTROPENIA: ICD-10-CM

## 2025-07-22 DIAGNOSIS — D50.0 IRON DEFICIENCY ANEMIA DUE TO CHRONIC BLOOD LOSS: Primary | ICD-10-CM

## 2025-07-22 DIAGNOSIS — R92.343 EXTREMELY DENSE TISSUE OF BOTH BREASTS ON MAMMOGRAPHY: ICD-10-CM

## 2025-07-22 PROCEDURE — 99999 PR PBB SHADOW E&M-EST. PATIENT-LVL IV: CPT | Mod: PBBFAC,,, | Performed by: INTERNAL MEDICINE

## 2025-07-22 PROCEDURE — 3078F DIAST BP <80 MM HG: CPT | Mod: CPTII,S$GLB,, | Performed by: INTERNAL MEDICINE

## 2025-07-22 PROCEDURE — 3074F SYST BP LT 130 MM HG: CPT | Mod: CPTII,S$GLB,, | Performed by: INTERNAL MEDICINE

## 2025-07-22 PROCEDURE — 1159F MED LIST DOCD IN RCRD: CPT | Mod: CPTII,S$GLB,, | Performed by: INTERNAL MEDICINE

## 2025-07-22 PROCEDURE — 1160F RVW MEDS BY RX/DR IN RCRD: CPT | Mod: CPTII,S$GLB,, | Performed by: INTERNAL MEDICINE

## 2025-07-22 PROCEDURE — 3008F BODY MASS INDEX DOCD: CPT | Mod: CPTII,S$GLB,, | Performed by: INTERNAL MEDICINE

## 2025-07-22 PROCEDURE — 99213 OFFICE O/P EST LOW 20 MIN: CPT | Mod: S$GLB,,, | Performed by: INTERNAL MEDICINE

## 2025-07-22 NOTE — PROGRESS NOTES
[facility]     Outpatient Hematology/Medical Oncology Note  Patient: Surinder Elizabeth  MRN: [id number]  Primary Care Provider: No, Primary Doctor  Chief Complaint:  Iron Deficiency Anemia     Subjective     Subjective:   HPI:   Surinder Elizabeth is a 50 y.o. female with PMH significant for:   - Benign Ethnic Neutropenia  - Uterine fibroids s/p TLH/BS on 2/11/2025    She is followed by Hematology for iron deficiency anemia 2/2 HMB now s/p TLH/BS on 2/11/2025. She presents today for routine follow up.     HPI:  2021 - 2023: increase in baseline HMB; Hb range: 6.4-8.4  3/16/23: TVUS: fibroid uterus - 3.1 cm subcuosal, 5.1 subserosal, 3.1 and 3 cm intramural fibroids   Prior labs: 2023 - folate: 15.9, b12: 795, HIV and hepatitis testing : negative, peripheral smear: mild anisopoikilocytosis, absolute neutropenia, copper: 1692  IV iron: Injectafer (8/10/2023) with improvement in Hb to 13.2 (10/2023); 6/2024 and 2/2025 (Hgb: 8.2 to 12.8)  2/11/2025: TLH/BS on 2/11/2025 June 2025: Hgb: 13.2, no recent iron studies    Interval History:    Mrs. Elizabeth overall feels well. Patient's current symptoms are:  (1) Fatigue: resolved      Patient otherwise denies other sites of bleeding, fevers, chills, night sweats, headaches, chest pain, SOB, cough, abdominal pain, N/V, diarrhea, constipation, weight loss, rashes     Review of Systems:  14-point review of systems was asked with the pertinent positives and/or negatives stated in HPI.     Past Medical History:   - COLTON, Uterine fibroids, Barthlolin's Cyst (2024)     Past Surgical HIstory:   Past Surgical History:   Procedure Laterality Date    CYSTOSCOPY N/A 2/11/2025    Procedure: CYSTOSCOPY;  Surgeon: Evita Euceda MD;  Location: [facility] OR;  Service: OB/GYN;  Laterality: N/A;    HYSTERECTOMY, TOTAL, LAPAROSCOPIC, WITH SALPINGECTOMY Bilateral 2/11/2025    Procedure: HYSTERECTOMY,TOTAL,LAPAROSCOPIC,WITH SALPINGECTOMY;  Surgeon: Evita Euceda MD;  Location:  [facility] OR;  Service: OB/GYN;  Laterality: Bilateral;    HYSTEROSCOPIC RESECTION OF MYOMA N/A 2024    Procedure: MYOMECTOMY, HYSTEROSCOPIC;  Surgeon: Evita Euceda MD;  Location: [facility] OR;  Service: OB/GYN;  Laterality: N/A;     High Risk Breast cancer specific history:  - Age: 50 y.o.   - Height:  5'7''  - Weight: 123 lbs  - Breast density per BI-RADS: extremely dense   - Prior Breast Biopsy: No  - Age at menarche:  13  - Number of pregnancies:   - History of breastfeeding: NA  - Age at menopause, if applicable: s/p hysterectomy in 2025 for aub  - Uterus and ovaries intact: ovaries intact, s/p TLH/BS on 2025  - HRT: No  - OCPs: No  - Genetic testing: No  - Personal history of cancer: No  - Previous chest radiation exposure between ages 10-30 years old: No  - [employer] Inheritance: No  - Race:      Family History:   - Father: Prostate Cancer (70s, early stage)  - No family hx of breast cancer     Social History:    reports that she has never smoked. She has never used smokeless tobacco. She reports current alcohol use. She reports that she does not use drugs.  - Occupation: [employer]    Allergies:  Review of patient's allergies indicates:  No Known Allergies    Medications:  Current Outpatient Medications   Medication Sig Dispense Refill    HYDROcodone-acetaminophen (NORCO) 5-325 mg per tablet Take 1 tablet by mouth every 6 (six) hours as needed for Pain. (Patient not taking: Reported on 2025) 12 tablet 0    ibuprofen (ADVIL,MOTRIN) 800 MG tablet Take 1 tablet (800 mg total) by mouth every 8 (eight) hours as needed for Pain. 30 tablet 0    valACYclovir (VALTREX) 1000 MG tablet TAKE 1 TABLET (1,000 MG TOTAL) BY MOUTH ONCE DAILY. FOR 5 DAYS AS NEEDED FOR OUTBREAKS 30 tablet 5     No current facility-administered medications for this visit.          Objective:   Vitals:   Vitals:    25 1317   BP: 113/69   Pulse: 71   Resp: 16   Temp: 98.7 °F (37.1 °C)   TempSrc: Oral  "  SpO2: 99%   Weight: 56.1 kg (123 lb 10.9 oz)   Height: 5' 8" (1.727 m)       BMI: Body mass index is 18.81 kg/m².    Physical Exam  General Appearance:    Alert, cooperative, no distress   Head:    Normocephalic, without obvious abnormality, atraumatic   Throat:   Lips, mucosa, and tongue normal; teeth and gums normal   Neck:   Supple, symmetrical, no adenopathy;     thyroid:  no enlargement/tenderness/nodules   Lungs:     Clear to auscultation bilaterally, respirations unlabored    Heart:    Regular rate and rhythm, S1 and S2 normal   Abdomen:     Soft, non-tender, bowel sounds active, no masses, no organomegaly   Extremities:   Extremities normal, atraumatic, no cyanosis or edema   Pulses:   2+ and symmetric all extremities   Skin:   Skin color, texture, turgor normal, no rashes or lesions   Lymph nodes:   Cervical, supraclavicular, and axillary nodes normal   Neurologic:   Normal strength, sensation   Laboratory Data:  No visits with results within 1 Week(s) from this visit.   Latest known visit with results is:   Lab Visit on 06/20/2025   Component Date Value Ref Range Status    WBC 06/20/2025 3.43 (L)  3.90 - 12.70 K/uL Final    RBC 06/20/2025 4.50  4.00 - 5.40 M/uL Final    HGB 06/20/2025 13.2  12.0 - 16.0 gm/dL Final    HCT 06/20/2025 40.2  37.0 - 48.5 % Final    MCV 06/20/2025 89  82 - 98 fL Final    MCH 06/20/2025 29.3  27.0 - 31.0 pg Final    MCHC 06/20/2025 32.8  32.0 - 36.0 g/dL Final    RDW 06/20/2025 14.8 (H)  11.5 - 14.5 % Final    Platelet Count 06/20/2025 295  150 - 450 K/uL Final    MPV 06/20/2025 9.0 (L)  9.2 - 12.9 fL Final    Nucleated RBC 06/20/2025 0  <=0 /100 WBC Final    Neut % 06/20/2025 49.2  38 - 73 % Final    Lymph % 06/20/2025 34.4  18 - 48 % Final    Mono % 06/20/2025 14.3  4 - 15 % Final    Eos % 06/20/2025 0.9  <=8 % Final    Basophil % 06/20/2025 0.9  <=1.9 % Final    Imm Grans % 06/20/2025 0.3  0.0 - 0.5 % Final    Neut # 06/20/2025 1.69 (L)  1.8 - 7.7 K/uL Final    Lymph # " 2025 1.18  1 - 4.8 K/uL Final    Mono # 2025 0.49  0.3 - 1 K/uL Final    Eos # 2025 0.03  <=0.5 K/uL Final    Baso # 2025 0.03  <=0.2 K/uL Final    Imm Grans # 2025 0.01  0.00 - 0.04 K/uL Final    Mild elevation in immature granulocytes is non specific and can be seen in a variety of conditions including stress response, acute inflammation, trauma and pregnancy. Correlation with other laboratory and clinical findings is essential.     Imagin24: TVUS: multiple fibroids - 3 cm submucosal fibroid in posterior uterus, 6 and 4 cm intramural fibroid in posterior uterus, endometrial wnl    Labs:  2024 labs: Hgb: 12.4, MCV: 86, WBC: 2900, ANC: 1500, platelets: 327; CMP: wnl, TSAT: 18% (2%), ferritin: 62    2025 Labs:  - Hgb: 12.8 (8.7), MCV: 85, WBC: 3770, platelets: 258   - CMP: unremarkable   - TSAT: 15%, ferritin: 182 (4)    2025:  - Hgb: 13.2, MCV: 89, WBC: 3430, ANC: 1690, platelets: 295    Assessment   Assessment and Plan:   Surinder Elizabeth is a 50 y.o. female with Benign Ethnic Neutropenia and Uterine Fibroids. She is followed by Hematology for iron deficiency anemia 2/ HMB now s/p TLH/BS on 2025. She presents today for routine follow up. She last received IV iron with injectafer 2/10 and 2025.    # Iron Deficiency Anemia, Resolved   - Last full labs: 3/24/2025 - Hgb: 12.8 (8.7), MCV: 85, WBC: 3770, platelets: 258, CMP: unremarkable, TSAT: 15%, ferritin: 182 (4 2024)    - Prior labs indicated iron deficiency with ferritin <30 and TSAT <20%; now with normalization of hemoglobin and ferritin after hysterectomy   - Repeat labs to ensure iron deficiency (tsat) resolves after TLH - not performed but hemoglobin now consistently normal at 13.2 in 2025 , ferritin: 182, TSAT:15% in 3/2025; and colonoscopy uptodate as below    # Mild Intermittent Neutropenia  - Presumably COLTON ie normal variant  - Peripheral smear: mild anisopoikilocytosis, absolute  neutropenia, Folate: 15.9, B12: 795, MMA <0.1, copper: 1692, HIV and hepatitis C testing: negative      # Other Co-Morbidities  - AUB: s/p TLH/BS on 2025, per gynecology  - Cancer Screening: Colonoscopy: 2023 (1 cm rectal hyperplastic polyp) - performed outside of [facility] (verify records), Pap:  (hpv: neg, pap: wnl), MM2025 (extremely dense breast tissue - TC: 12% - discussed reduced sensitivity of mammogram with dense breasts; will plan for mammogram and breast ultrasound 2026)- encouraged PCP f/u    Follow Up:  - RTC 2026 with labs and breast ultrasound/mammogram prior - if normal, can follow w/ PCP there-following      Med Onc Chart Routing      Follow up with physician . RTC 2026 with labs (CBC, CMP, ferritin, iron profile) and screening mammogram AND breast ultrasound one week prior to return visit; please place pt on recall list   Follow up with [facility]    Infusion scheduling note    Injection scheduling note    Labs    Imaging    Pharmacy appointment    Other referrals             MDM includes:    - Acute or chronic illness or injury that poses a threat to life or bodily function  - Review of prior external notes from unique source  - Independent review and explanation of 3+ results from unique tests   - Discussion of management and ordering 3+ unique tests   - Extensive discussion of treatment and management    - Overall, I discussed the diagnosis, history, stage, labs/imaging, prognosis, management, and treatment plan as applicable. I reviewed adverse short and long term effects as applicable.   - Informed patient if symptoms are getting worse that it is their responsibility to call the clinic and schedule follow up sooner than stated follow up. Also informed patient if they do not hear from the appointment center in 2-5 business days for their referrals, the patient must call the Oncology clinic so we can follow up on procedures or referral scheduling. Patient was fully informed of  current medical plan, all questions were answered and patient verbalized understanding. No further questions.   - Face to Face Visit time I spent with the patient: 30 minutes of total time spent on the encounter, including counseling patient and/or coordinating care, which includes face to face time and non-face to face time preparing to see the patient (eg, review of tests), Obtaining and/or reviewing separately obtained history, Documenting clinical information in the electronic or other health record, Independently interpreting results (not separately reported) and communicating results to the patient/family/caregiver, or Care coordination (not separately reported).     Signed   Beatrice Lester MD  [facility]

## (undated) DEVICE — SOL STRL WATER INJ 1000ML BG

## (undated) DEVICE — SYR 50CC LL

## (undated) DEVICE — SEE MEDLINE ITEM 154981

## (undated) DEVICE — GOWN POLY REINF BRTH SLV XL

## (undated) DEVICE — DEVICE MYOSURE XL FMS TISS REM

## (undated) DEVICE — SUT MONOCRYL 4-0 PS-1 UND

## (undated) DEVICE — PACK SURGERY START

## (undated) DEVICE — SET IRR URLGY 2LINE UNIV SPIKE

## (undated) DEVICE — PAD CNTOUR SUP-ABSRB POSTPRTM

## (undated) DEVICE — SEAL LENS SCOPE MYOSURE

## (undated) DEVICE — Device

## (undated) DEVICE — GLOVE BIOGEL PIMICRO INDIC 6.5

## (undated) DEVICE — GOWN POLY REINF BRTH SLV LG

## (undated) DEVICE — ENDOSTITCH POLYSORB 0 ES-9

## (undated) DEVICE — SEE L#152161

## (undated) DEVICE — PAD PINK TRENDELENBURG POS XL

## (undated) DEVICE — NDL SPINAL 18GX3.5 SPINOCAN

## (undated) DEVICE — TROCAR ENDOPATH XCEL 11MM 10CM

## (undated) DEVICE — SUT 0 VICRYL / CT-1

## (undated) DEVICE — SCISSOR 5MMX35CM DIRECT DRIVE

## (undated) DEVICE — DRESSING LEUKOPLAST FLEX 1X3IN

## (undated) DEVICE — TROCAR ENDOPATH XCEL 5X75MM

## (undated) DEVICE — DRAPE LEGGINGS CUFF 33X51IN

## (undated) DEVICE — APPLICATOR CHLORAPREP ORN 26ML

## (undated) DEVICE — DRAPE UNDERBUTTOCKS PCH STRL

## (undated) DEVICE — CLOSURE SKIN STERI STRIP 1/2X4

## (undated) DEVICE — SPONGE LAP 18X18 PREWASHED

## (undated) DEVICE — TIP RUMI KOH-EFFICIENT 3.0

## (undated) DEVICE — SOL IRR WATER STRL 3000 ML

## (undated) DEVICE — BANDAGE ADHESIVE PLAS STRL 1X3

## (undated) DEVICE — GLOVE BIOGEL SKINSENSE PI 6.5

## (undated) DEVICE — TROCAR ENDOPATH XCEL 5MM 7.5CM

## (undated) DEVICE — NDL HYPO REG 25G X 1 1/2

## (undated) DEVICE — SYS SEE SHARP SCP ANTIFG LNG

## (undated) DEVICE — PACK FLUENT DISPOSABLE

## (undated) DEVICE — SOL NACL IRR 3000ML

## (undated) DEVICE — SEALER LIGASURE LAP 37CM 5MM

## (undated) DEVICE — TRAY CATH 1-LYR URIMTR 16FR

## (undated) DEVICE — SOL CLEARIFY VISUALIZATION LAP

## (undated) DEVICE — SEE L#120831

## (undated) DEVICE — DISSECTOR EPIX LAPA 5MMX35CM

## (undated) DEVICE — TIP RUMI BLUE DISPOSABLE 5/BX

## (undated) DEVICE — ENDOSTITCH INSTRUMENT

## (undated) DEVICE — NDL SPINAL SPINOCAN 22GX3.5

## (undated) DEVICE — IRRIGATOR ENDOSCOPY DISP.

## (undated) DEVICE — NDL INSUF ULTRA VERESS 120MM

## (undated) DEVICE — DRAPE UINDERBUT GRAD PCH

## (undated) DEVICE — SYR 10CC LUER LOCK

## (undated) DEVICE — ELECTRODE PNCL SMOKE EVAC 10FT

## (undated) DEVICE — ELECTRODE REM PLYHSV RETURN 9